# Patient Record
Sex: FEMALE | Race: WHITE | NOT HISPANIC OR LATINO | Employment: FULL TIME | URBAN - METROPOLITAN AREA
[De-identification: names, ages, dates, MRNs, and addresses within clinical notes are randomized per-mention and may not be internally consistent; named-entity substitution may affect disease eponyms.]

---

## 2018-10-12 ENCOUNTER — PROCEDURE VISIT (OUTPATIENT)
Dept: FAMILY MEDICINE | Facility: CLINIC | Age: 48
End: 2018-10-12
Payer: COMMERCIAL

## 2018-10-12 DIAGNOSIS — Z23 NEED FOR PROPHYLACTIC VACCINATION AND INOCULATION AGAINST INFLUENZA: Primary | ICD-10-CM

## 2018-10-12 PROCEDURE — 90686 IIV4 VACC NO PRSV 0.5 ML IM: CPT | Mod: S$GLB,,, | Performed by: FAMILY MEDICINE

## 2019-06-25 ENCOUNTER — TELEPHONE (OUTPATIENT)
Dept: FAMILY MEDICINE | Facility: CLINIC | Age: 49
End: 2019-06-25

## 2019-06-25 ENCOUNTER — HOSPITAL ENCOUNTER (OUTPATIENT)
Dept: RADIOLOGY | Facility: HOSPITAL | Age: 49
Discharge: HOME | End: 2019-06-25
Attending: NURSE PRACTITIONER
Payer: COMMERCIAL

## 2019-06-25 ENCOUNTER — OFFICE VISIT (OUTPATIENT)
Dept: INTERNAL MEDICINE | Facility: CLINIC | Age: 49
End: 2019-06-25
Payer: COMMERCIAL

## 2019-06-25 VITALS
BODY MASS INDEX: 28 KG/M2 | SYSTOLIC BLOOD PRESSURE: 126 MMHG | OXYGEN SATURATION: 98 % | DIASTOLIC BLOOD PRESSURE: 79 MMHG | HEART RATE: 68 BPM | HEIGHT: 64 IN | WEIGHT: 164 LBS

## 2019-06-25 DIAGNOSIS — R07.81 RIB PAIN: ICD-10-CM

## 2019-06-25 DIAGNOSIS — R07.81 RIB PAIN: Primary | ICD-10-CM

## 2019-06-25 PROCEDURE — 99999 PR PBB SHADOW E&M-EST. PATIENT-LVL III: ICD-10-PCS | Mod: PBBFAC,,, | Performed by: NURSE PRACTITIONER

## 2019-06-25 PROCEDURE — 99999 PR PBB SHADOW E&M-EST. PATIENT-LVL III: CPT | Mod: PBBFAC,,, | Performed by: NURSE PRACTITIONER

## 2019-06-25 PROCEDURE — 99212 PR OFFICE/OUTPT VISIT, EST, LEVL II, 10-19 MIN: ICD-10-PCS | Mod: S$GLB,,, | Performed by: NURSE PRACTITIONER

## 2019-06-25 PROCEDURE — 71046 X-RAY EXAM CHEST 2 VIEWS: CPT | Mod: TC,FY,PN

## 2019-06-25 PROCEDURE — 71046 X-RAY EXAM CHEST 2 VIEWS: CPT | Mod: 26,,, | Performed by: RADIOLOGY

## 2019-06-25 PROCEDURE — 71046 XR CHEST PA AND LATERAL: ICD-10-PCS | Mod: 26,,, | Performed by: RADIOLOGY

## 2019-06-25 PROCEDURE — 99212 OFFICE O/P EST SF 10 MIN: CPT | Mod: S$GLB,,, | Performed by: NURSE PRACTITIONER

## 2019-06-25 NOTE — TELEPHONE ENCOUNTER
Attempted to call pt x3, unable to LVM.             ----- Message from Elena Corea NP sent at 6/25/2019 11:40 AM CDT -----  I received her xray, please let patient know no broken ribs. However, mild bilateral AC (top of shoulder) joint changes.  Continue rest, ibuprofen OTC as needed  Rest and ice. Follow up if needed

## 2019-06-25 NOTE — PROGRESS NOTES
I received her xray, please let patient know no broken ribs. However, mild bilateral AC (top of shoulder) joint changes.  Continue rest, ibuprofen OTC as needed  Rest and ice. Follow up if needed

## 2019-06-25 NOTE — PROGRESS NOTES
"Subjective:       Patient ID: Anna Marie Lindo is a 48 y.o. female.    Chief Complaint: Chest Pain (possible broke rib while taking karate)    Anna Marie Lindo is a 48 year old female who presents to the clinic today for possible right rib fracture. She reports she participates in karate. She was sparing last Thursday, and go hit on her right chest. She reports intermittent rib pain, especially at night time. She reports she is taking aleve/ibuprofen with no relief. She reports, since the pain continued I wanted to have this confirmed or not. Denies chest tightness, but localized right lower chest pain at times. Susy sob at rest and exertion. Denies n/v/d.     Review of Systems   Constitutional: Negative for activity change, appetite change, chills, fatigue and fever.   HENT: Negative for congestion, tinnitus and trouble swallowing.    Eyes: Negative for photophobia, pain and visual disturbance.   Respiratory: Negative for apnea, cough, chest tightness, shortness of breath and wheezing.    Cardiovascular: Positive for chest pain (Right lower side). Negative for palpitations and leg swelling.   Gastrointestinal: Negative for abdominal distention, abdominal pain, constipation, diarrhea, nausea and vomiting.   Musculoskeletal: Positive for arthralgias (Right chest wall) and myalgias (Right chest wall). Negative for back pain, gait problem, joint swelling, neck pain and neck stiffness.   Skin: Negative for color change.   Neurological: Negative for dizziness, tremors, seizures, syncope, weakness, light-headedness, numbness and headaches.   Psychiatric/Behavioral: Positive for sleep disturbance (Related to pain). Negative for agitation and confusion. The patient is not nervous/anxious and is not hyperactive.          Reviewed family, medical, surgical, and social history.    Objective:      /79   Pulse 68   Ht 5' 4" (1.626 m)   Wt 74.4 kg (164 lb)   SpO2 98%   BMI 28.15 kg/m²   Physical Exam   Constitutional: " She is oriented to person, place, and time. She appears well-developed.   HENT:   Head: Normocephalic.   Eyes: Pupils are equal, round, and reactive to light.   Neck: Normal range of motion.   Cardiovascular: Normal rate, regular rhythm, normal heart sounds and intact distal pulses.   Pulmonary/Chest: Effort normal and breath sounds normal.       Abdominal: Soft. Bowel sounds are normal.   Musculoskeletal: Normal range of motion.   Neurological: She is alert and oriented to person, place, and time.   Skin: Skin is warm. Capillary refill takes less than 2 seconds.   Psychiatric: She has a normal mood and affect. Her behavior is normal. Judgment and thought content normal.       Assessment:       1. Rib pain        Plan:       Rib pain  -     X-Ray Chest PA And Lateral; Future; Expected date: 06/25/2019          PLAN:  - Discussed with patient the plan of care  - CXR ordered ; complete today  -Rib fracture possible; discussed with patient   - Encouraged rest and to not participate in karate until symptoms resolved   - Medications reviewed. Medication side effects discussed. Patient has no questions or concerns at this time. Informed patient to notify me regarding any concerns.   - Continue monitoring symptoms  - Informed patient to please notify me with any questions or concerns at anytime  - Follow up ordered PRN        Risks, benefits, and side effects were discussed with the patient. All questions were answered to the fullest satisfaction of the patient, and pt verbalized understanding and agreement to treatment plan. Pt was to call with any new or worsening symptoms, or present to the ER.

## 2019-10-03 ENCOUNTER — IMMUNIZATION (OUTPATIENT)
Dept: FAMILY MEDICINE | Facility: CLINIC | Age: 49
End: 2019-10-03

## 2019-10-03 PROCEDURE — 90686 FLU VACCINE (QUAD) GREATER THAN OR EQUAL TO 3YO PRESERVATIVE FREE IM: ICD-10-PCS | Mod: S$GLB,,, | Performed by: FAMILY MEDICINE

## 2019-10-03 PROCEDURE — 90686 IIV4 VACC NO PRSV 0.5 ML IM: CPT | Mod: S$GLB,,, | Performed by: FAMILY MEDICINE

## 2019-10-10 ENCOUNTER — CLINICAL SUPPORT (OUTPATIENT)
Dept: INTERNAL MEDICINE | Facility: CLINIC | Age: 49
End: 2019-10-10

## 2019-10-10 VITALS
HEIGHT: 64 IN | WEIGHT: 159 LBS | HEART RATE: 76 BPM | BODY MASS INDEX: 27.14 KG/M2 | SYSTOLIC BLOOD PRESSURE: 116 MMHG | TEMPERATURE: 98 F | OXYGEN SATURATION: 99 % | DIASTOLIC BLOOD PRESSURE: 70 MMHG

## 2019-10-10 DIAGNOSIS — Z00.00 ROUTINE GENERAL MEDICAL EXAMINATION AT A HEALTH CARE FACILITY: Primary | ICD-10-CM

## 2019-10-10 DIAGNOSIS — E87.6 HYPOKALEMIA: ICD-10-CM

## 2019-10-10 LAB
ALBUMIN SERPL BCP-MCNC: 3.9 G/DL (ref 3.5–5.2)
ALP SERPL-CCNC: 64 U/L (ref 55–135)
ALT SERPL W/O P-5'-P-CCNC: 28 U/L (ref 10–44)
ANION GAP SERPL CALC-SCNC: 12 MMOL/L (ref 8–16)
AST SERPL-CCNC: 24 U/L (ref 10–40)
BASOPHILS # BLD AUTO: 0.02 K/UL (ref 0–0.2)
BASOPHILS NFR BLD: 0.3 % (ref 0–1.9)
BILIRUB SERPL-MCNC: 0.3 MG/DL (ref 0.1–1)
BILIRUB UR QL STRIP: NEGATIVE
BUN SERPL-MCNC: 14 MG/DL (ref 6–20)
CALCIUM SERPL-MCNC: 8.9 MG/DL (ref 8.7–10.5)
CHLORIDE SERPL-SCNC: 97 MMOL/L (ref 95–110)
CHOLEST SERPL-MCNC: 189 MG/DL (ref 120–199)
CHOLEST/HDLC SERPL: 3.4 {RATIO} (ref 2–5)
CLARITY UR: CLEAR
CO2 SERPL-SCNC: 28 MMOL/L (ref 23–29)
COLOR UR: YELLOW
CREAT SERPL-MCNC: 0.7 MG/DL (ref 0.5–1.4)
CRP SERPL-MCNC: 0.21 MG/DL (ref 0–0.75)
DIFFERENTIAL METHOD: ABNORMAL
EOSINOPHIL # BLD AUTO: 0.1 K/UL (ref 0–0.5)
EOSINOPHIL NFR BLD: 1.6 % (ref 0–8)
ERYTHROCYTE [DISTWIDTH] IN BLOOD BY AUTOMATED COUNT: 11.8 % (ref 11.5–14.5)
EST. GFR  (AFRICAN AMERICAN): >60 ML/MIN/1.73 M^2
EST. GFR  (NON AFRICAN AMERICAN): >60 ML/MIN/1.73 M^2
GLUCOSE SERPL-MCNC: 91 MG/DL (ref 70–110)
GLUCOSE UR QL STRIP: NEGATIVE
HCT VFR BLD AUTO: 40.6 % (ref 37–48.5)
HDLC SERPL-MCNC: 55 MG/DL (ref 40–75)
HDLC SERPL: 29.1 % (ref 20–50)
HGB BLD-MCNC: 13.9 G/DL (ref 12–16)
HGB UR QL STRIP: NEGATIVE
IMM GRANULOCYTES # BLD AUTO: 0.01 K/UL (ref 0–0.04)
IMM GRANULOCYTES NFR BLD AUTO: 0.2 % (ref 0–0.5)
KETONES UR QL STRIP: NEGATIVE
LDLC SERPL CALC-MCNC: 103.2 MG/DL (ref 63–159)
LEUKOCYTE ESTERASE UR QL STRIP: NEGATIVE
LYMPHOCYTES # BLD AUTO: 1.9 K/UL (ref 1–4.8)
LYMPHOCYTES NFR BLD: 31 % (ref 18–48)
MCH RBC QN AUTO: 31.3 PG (ref 27–31)
MCHC RBC AUTO-ENTMCNC: 34.2 G/DL (ref 32–36)
MCV RBC AUTO: 91 FL (ref 82–98)
MONOCYTES # BLD AUTO: 0.5 K/UL (ref 0.3–1)
MONOCYTES NFR BLD: 7.2 % (ref 4–15)
NEUTROPHILS # BLD AUTO: 3.7 K/UL (ref 1.8–7.7)
NEUTROPHILS NFR BLD: 59.7 % (ref 38–73)
NITRITE UR QL STRIP: NEGATIVE
NONHDLC SERPL-MCNC: 134 MG/DL
NRBC BLD-RTO: 0 /100 WBC
PH UR STRIP: 6 [PH] (ref 5–8)
PLATELET # BLD AUTO: 284 K/UL (ref 150–350)
PMV BLD AUTO: 11 FL (ref 9.2–12.9)
POTASSIUM SERPL-SCNC: 3.1 MMOL/L (ref 3.5–5.1)
PROT SERPL-MCNC: 6.9 G/DL (ref 6–8.4)
PROT UR QL STRIP: NEGATIVE
RBC # BLD AUTO: 4.44 M/UL (ref 4–5.4)
SODIUM SERPL-SCNC: 137 MMOL/L (ref 136–145)
SP GR UR STRIP: 1.01 (ref 1–1.03)
TRIGL SERPL-MCNC: 154 MG/DL (ref 30–150)
TSH SERPL DL<=0.005 MIU/L-ACNC: 0.8 UIU/ML (ref 0.34–5.6)
URN SPEC COLLECT METH UR: NORMAL
UROBILINOGEN UR STRIP-ACNC: NEGATIVE EU/DL
WBC # BLD AUTO: 6.23 K/UL (ref 3.9–12.7)

## 2019-10-10 PROCEDURE — 80053 COMPREHEN METABOLIC PANEL: CPT

## 2019-10-10 PROCEDURE — 85025 COMPLETE CBC W/AUTO DIFF WBC: CPT

## 2019-10-10 PROCEDURE — 92551 PURE TONE HEARING TEST AIR: CPT | Mod: ,,, | Performed by: FAMILY MEDICINE

## 2019-10-10 PROCEDURE — 81003 URINALYSIS AUTO W/O SCOPE: CPT

## 2019-10-10 PROCEDURE — 99499 PR PHYSICAL - BASIC NON DOT/CDL: ICD-10-PCS | Mod: ,,, | Performed by: NURSE PRACTITIONER

## 2019-10-10 PROCEDURE — 99499 UNLISTED E&M SERVICE: CPT | Mod: ,,, | Performed by: NURSE PRACTITIONER

## 2019-10-10 PROCEDURE — 80061 LIPID PANEL: CPT

## 2019-10-10 PROCEDURE — 84443 ASSAY THYROID STIM HORMONE: CPT

## 2019-10-10 PROCEDURE — 93005 PR ELECTROCARDIOGRAM, TRACING: ICD-10-PCS | Mod: ,,, | Performed by: FAMILY MEDICINE

## 2019-10-10 PROCEDURE — 93005 ELECTROCARDIOGRAM TRACING: CPT | Mod: ,,, | Performed by: FAMILY MEDICINE

## 2019-10-10 PROCEDURE — 86140 C-REACTIVE PROTEIN: CPT

## 2019-10-10 PROCEDURE — 92551 PR PURE TONE HEARING TEST, AIR: ICD-10-PCS | Mod: ,,, | Performed by: FAMILY MEDICINE

## 2019-10-10 PROCEDURE — 83036 HEMOGLOBIN GLYCOSYLATED A1C: CPT

## 2019-10-10 RX ORDER — ALPRAZOLAM 0.5 MG/1
TABLET ORAL
COMMUNITY
End: 2021-07-29

## 2019-10-10 RX ORDER — CHOLECALCIFEROL (VITAMIN D3) 625 MCG
1 CAPSULE ORAL
Refills: 1 | COMMUNITY
Start: 2019-09-14 | End: 2021-07-29

## 2019-10-10 RX ORDER — HYDROCHLOROTHIAZIDE 50 MG/1
50 TABLET ORAL DAILY
Refills: 0 | COMMUNITY
Start: 2019-09-23 | End: 2020-03-10

## 2019-10-10 NOTE — PROGRESS NOTES
"Subjective:       Patient ID: Anna Marie Lindo is a 49 y.o. female.    Chief Complaint: Employment Physical (annual physical for [employer])    Mrs. Anna Marie Lindo is a 49 year old female who  presents to the clinic today for an occupational history and physical exam. 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 Today, she voices no questions/concerns. She denies tobacco use, alcohol or drug use. She is able to lift, bend, walk & exercise without SOB or CP. Overall, she denies CP, SOB, N/V/D, pain, headache, or any changes.       Review of Systems   Constitutional: Negative for activity change, appetite change, chills, fatigue, fever and unexpected weight change.   HENT: Negative for congestion, ear pain, postnasal drip, sore throat and tinnitus.    Eyes: Negative for pain and visual disturbance.        Glasses   Respiratory: Negative for apnea, cough, chest tightness, shortness of breath and wheezing.    Cardiovascular: Negative for chest pain, palpitations and leg swelling.   Gastrointestinal: Negative for abdominal distention, abdominal pain, blood in stool, constipation, diarrhea, nausea and vomiting.   Endocrine: Negative for polydipsia and polyuria.   Genitourinary: Negative for difficulty urinating, flank pain, frequency and urgency.   Musculoskeletal: Negative for arthralgias, back pain, joint swelling and myalgias.   Skin: Negative for color change, pallor and rash.   Allergic/Immunologic: Negative for environmental allergies and food allergies.   Neurological: Negative for dizziness, tremors, syncope, weakness, light-headedness and headaches.   Hematological: Does not bruise/bleed easily.   Psychiatric/Behavioral: Negative for agitation, decreased concentration, self-injury, sleep disturbance and suicidal ideas. The patient is not nervous/anxious.          Reviewed family, medical, surgical, and social history.    Objective:      /70   Pulse 76   Temp 97.6 °F (36.4 °C)   Ht 5' 4" " (1.626 m)   Wt 72.1 kg (159 lb)   SpO2 99%   BMI 27.29 kg/m²   Physical Exam   Constitutional: She is oriented to person, place, and time. She appears well-developed and well-nourished. She is cooperative. No distress.   HENT:   Head: Normocephalic and atraumatic.   Right Ear: Hearing, tympanic membrane, external ear and ear canal normal.   Left Ear: Hearing, tympanic membrane, external ear and ear canal normal.   Nose: Nose normal.   Mouth/Throat: Uvula is midline, oropharynx is clear and moist and mucous membranes are normal. No uvula swelling.   Eyes: Pupils are equal, round, and reactive to light. Conjunctivae, EOM and lids are normal.   Neck: Trachea normal and full passive range of motion without pain. No tracheal tenderness present. No neck rigidity. No thyroid mass present.   Cardiovascular: Normal rate, regular rhythm, S1 normal, S2 normal, normal heart sounds, intact distal pulses and normal pulses.   Pulmonary/Chest: Effort normal and breath sounds normal. No respiratory distress. She has no decreased breath sounds. She has no wheezes.   Abdominal: Soft. Normal appearance and bowel sounds are normal. She exhibits no distension and no abdominal bruit. There is no guarding and no CVA tenderness.   Musculoskeletal: She exhibits no edema, tenderness or deformity.   Lymphadenopathy:     She has no cervical adenopathy.   Neurological: She is alert and oriented to person, place, and time. She has normal strength. She exhibits normal muscle tone.   Skin: Skin is warm, dry and intact. Capillary refill takes less than 2 seconds. No abrasion, no laceration, no lesion and no rash noted. She is not diaphoretic. No cyanosis. Nails show no clubbing.   Psychiatric: She has a normal mood and affect. Her speech is normal and behavior is normal. Judgment and thought content normal. Cognition and memory are normal.       Assessment:       1. Routine general medical examination at a health care facility        Plan:        Routine general medical examination at a health care facility  -     Lipid panel; Future; Expected date: 10/10/2019  -     Hemoglobin A1c; Future; Expected date: 10/10/2019  -     C-reactive protein; Future; Expected date: 10/10/2019  -     Comprehensive metabolic panel; Future; Expected date: 10/15/2019  -     URINALYSIS  -     TSH; Future; Expected date: 10/10/2019  -     CBC auto differential; Future; Expected date: 10/15/2019          PLAN:  - Discussed with patient the plan of care  - Labs ordered and collected  - PFT and audiogram reviewed in clinic  - Work safety and PPE reviewed and encouraged   - Medications reviewed. Medication side effects discussed. Patient has no questions or concerns at this time. Informed patient to notify me regarding any concerns.   - Informed patient to please notify me with any questions or concerns at anytime  - Follow up ordered for 1 year and with PCP as ordered  - See scanned images      Risks, benefits, and side effects were discussed with the patient. All questions were answered to the fullest satisfaction of the patient, and pt verbalized understanding and agreement to treatment plan. Pt was to call with any new or worsening symptoms, or present to the ER.

## 2019-10-11 LAB
ESTIMATED AVG GLUCOSE: 103 MG/DL (ref 68–131)
HBA1C MFR BLD HPLC: 5.2 % (ref 4.5–6.2)

## 2019-10-11 NOTE — PROCEDURES
DATE OF PROCEDURE:  10/10/2019.    REFERRING EXAMINER:  Francisco Gunter Powell.    CLINICAL INDICATIONS:  Employment.    EKG shows a sinus bradycardia with a rate of 72.    INTERPRETATION:  EKG is within normal limits except for sinus bradycardia  being present at a rate of 52.        ANGELO/GREG dd: 10/11/2019 06:21:28 (CDT)   td: 10/11/2019 09:27:00 (CDT)  Doc ID #[id number]   Job ID #[id number]    CC:

## 2019-10-14 DIAGNOSIS — E87.6 HYPOKALEMIA: Primary | ICD-10-CM

## 2019-10-14 RX ORDER — POTASSIUM CHLORIDE 20 MEQ/1
TABLET, EXTENDED RELEASE ORAL
Qty: 2 TABLET | Refills: 0 | Status: SHIPPED | OUTPATIENT
Start: 2019-10-14 | End: 2019-10-17 | Stop reason: SDUPTHER

## 2019-10-14 NOTE — PROGRESS NOTES
I reviewed labs. Staff at the [facility] notified patient of labs. Med sent to pharmacy. Will repeat lab wednesday.

## 2019-10-16 ENCOUNTER — CLINICAL SUPPORT (OUTPATIENT)
Dept: INTERNAL MEDICINE | Facility: CLINIC | Age: 49
End: 2019-10-16
Payer: COMMERCIAL

## 2019-10-16 DIAGNOSIS — E87.6 HYPOKALEMIA: ICD-10-CM

## 2019-10-16 LAB — POTASSIUM SERPL-SCNC: 3.3 MMOL/L (ref 3.5–5.1)

## 2019-10-16 PROCEDURE — 84132 ASSAY OF SERUM POTASSIUM: CPT

## 2019-10-17 ENCOUNTER — TELEPHONE (OUTPATIENT)
Dept: FAMILY MEDICINE | Facility: CLINIC | Age: 49
End: 2019-10-17

## 2019-10-17 RX ORDER — POTASSIUM CHLORIDE 20 MEQ/1
TABLET, EXTENDED RELEASE ORAL
Qty: 1 TABLET | Refills: 0 | Status: SHIPPED | OUTPATIENT
Start: 2019-10-17 | End: 2020-02-10

## 2019-10-17 NOTE — PROGRESS NOTES
Please let Ms Thrasher know that I sent In 1 more dose of potassium. Her potassium was 3.3.   Normal is 3.5-5.

## 2019-10-17 NOTE — TELEPHONE ENCOUNTER
Called pt x3, unable to LVM.        ----- Message from Elena Corea NP sent at 10/17/2019  9:25 AM CDT -----  Please let Ms Thrasher know that I sent In 1 more dose of potassium. Her potassium was 3.3.   Normal is 3.5-5.

## 2019-10-21 ENCOUNTER — TELEPHONE (OUTPATIENT)
Dept: INTERNAL MEDICINE | Facility: CLINIC | Age: 49
End: 2019-10-21

## 2019-10-21 NOTE — TELEPHONE ENCOUNTER
Labs, EKG reviewed  Cleared to work without restriction  F/U with [facility] regarding labs  Letter being sent in mail for [facility] med physical

## 2019-10-21 NOTE — TELEPHONE ENCOUNTER
Janice ms sushma called to see if we had gotten her results back. I read in epic where they were not able to get in touch with her to tell her that you called in another dose of potassium. She asked If she needed to come back and get redrawn like she did last week. I told her that I would ask you and get back with her. She is going today to get the meds, please advise

## 2020-02-06 ENCOUNTER — PATIENT OUTREACH (OUTPATIENT)
Dept: ADMINISTRATIVE | Facility: HOSPITAL | Age: 50
End: 2020-02-06

## 2020-02-10 ENCOUNTER — OFFICE VISIT (OUTPATIENT)
Dept: INTERNAL MEDICINE | Facility: CLINIC | Age: 50
End: 2020-02-10
Payer: COMMERCIAL

## 2020-02-10 VITALS
WEIGHT: 161 LBS | HEIGHT: 64 IN | HEART RATE: 73 BPM | SYSTOLIC BLOOD PRESSURE: 115 MMHG | DIASTOLIC BLOOD PRESSURE: 75 MMHG | OXYGEN SATURATION: 99 % | BODY MASS INDEX: 27.49 KG/M2

## 2020-02-10 DIAGNOSIS — E87.6 HYPOKALEMIA: ICD-10-CM

## 2020-02-10 DIAGNOSIS — R79.89 ELEVATED LFTS: Primary | ICD-10-CM

## 2020-02-10 PROCEDURE — 99214 OFFICE O/P EST MOD 30 MIN: CPT | Mod: S$GLB,,, | Performed by: NURSE PRACTITIONER

## 2020-02-10 PROCEDURE — 99999 PR PBB SHADOW E&M-EST. PATIENT-LVL III: CPT | Mod: PBBFAC,,, | Performed by: NURSE PRACTITIONER

## 2020-02-10 PROCEDURE — 99214 PR OFFICE/OUTPT VISIT, EST, LEVL IV, 30-39 MIN: ICD-10-PCS | Mod: S$GLB,,, | Performed by: NURSE PRACTITIONER

## 2020-02-10 PROCEDURE — 99999 PR PBB SHADOW E&M-EST. PATIENT-LVL III: ICD-10-PCS | Mod: PBBFAC,,, | Performed by: NURSE PRACTITIONER

## 2020-02-10 RX ORDER — ALPRAZOLAM 0.5 MG/1
TABLET ORAL
COMMUNITY
End: 2022-02-09

## 2020-02-10 RX ORDER — POTASSIUM CHLORIDE 750 MG/1
10 CAPSULE, EXTENDED RELEASE ORAL DAILY
Qty: 30 CAPSULE | Refills: 6 | Status: SHIPPED | OUTPATIENT
Start: 2020-02-10 | End: 2021-05-13 | Stop reason: SDUPTHER

## 2020-02-10 NOTE — PROGRESS NOTES
Subjective:       Patient ID: Anna Marie Lindo is a 49 y.o. female.    Chief Complaint: lab review    Ms. Anna Marie Lindo is a 49 year old female who presents to the clinic today for lab review. She had outside labs completed at [facility]. She is requesting to transition care, since this is closer to her job. She reports she has HRT with her OBGYN. She gets injections every 3 months. She takes HCTZ 50 mg daily, for swelling, and reports hypokalemia in her labs. States she does not take potassium daily. Denies palpitations, sob or cp. Reports she takes HCTZ for swelling in her arms and legs.     Health Maintenance:  - LIPID: October  - Mammogram: Due, reports that she has it scheduled upcoming   - PAP: UTD    Review of Systems   Constitutional: Negative for activity change, appetite change, chills, fatigue, fever and unexpected weight change.   HENT: Negative for congestion, ear pain, postnasal drip, sore throat and tinnitus.    Eyes: Negative for pain and visual disturbance.        Glasses   Respiratory: Negative for apnea, cough, chest tightness, shortness of breath and wheezing.    Cardiovascular: Positive for leg swelling. Negative for chest pain and palpitations.   Gastrointestinal: Negative for abdominal distention, abdominal pain, blood in stool, constipation, diarrhea, nausea and vomiting.   Endocrine: Negative for polydipsia and polyuria.   Genitourinary: Negative for difficulty urinating, flank pain, frequency and urgency.   Musculoskeletal: Negative for arthralgias, back pain, joint swelling and myalgias.   Skin: Negative for color change, pallor and rash.   Allergic/Immunologic: Negative for environmental allergies and food allergies.   Neurological: Negative for dizziness, tremors, syncope, weakness, light-headedness and headaches.   Hematological: Does not bruise/bleed easily.   Psychiatric/Behavioral: Negative for agitation, decreased concentration, self-injury, sleep disturbance and suicidal ideas. The  "patient is not nervous/anxious.          Reviewed family, medical, surgical, and social history.    Objective:      /75   Pulse 73   Ht 5' 4" (1.626 m)   Wt 73 kg (161 lb)   SpO2 99%   BMI 27.64 kg/m²   Physical Exam   Constitutional: She is oriented to person, place, and time. She appears well-developed and well-nourished. She is cooperative. No distress.   HENT:   Head: Normocephalic and atraumatic.   Nose: Nose normal.   Mouth/Throat: Uvula is midline, oropharynx is clear and moist and mucous membranes are normal. No uvula swelling.   Eyes: Pupils are equal, round, and reactive to light. Conjunctivae, EOM and lids are normal.   Neck: Trachea normal and full passive range of motion without pain. No tracheal tenderness present. No neck rigidity. No thyroid mass present.   Cardiovascular: Normal rate, regular rhythm, S1 normal, S2 normal, normal heart sounds, intact distal pulses and normal pulses.   Pulmonary/Chest: Effort normal and breath sounds normal. No respiratory distress. She has no decreased breath sounds. She has no wheezes.   Abdominal: Soft. Normal appearance and bowel sounds are normal. She exhibits no distension and no abdominal bruit. There is no guarding and no CVA tenderness.   Musculoskeletal: She exhibits no edema, tenderness or deformity.   Lymphadenopathy:     She has no cervical adenopathy.   Neurological: She is alert and oriented to person, place, and time. She has normal strength. She exhibits normal muscle tone.   Skin: Skin is warm, dry and intact. Capillary refill takes less than 2 seconds. No abrasion, no laceration, no lesion and no rash noted. She is not diaphoretic. No cyanosis. Nails show no clubbing.   Psychiatric: She has a normal mood and affect. Her speech is normal and behavior is normal. Judgment and thought content normal. Cognition and memory are normal.       Assessment:       1. Elevated LFTs    2. Hypokalemia        Plan:       Elevated LFTs  -     " Comprehensive metabolic panel; Future; Expected date: 03/10/2020    Hypokalemia  -     potassium chloride (MICRO-K) 10 MEQ CpSR; Take 1 capsule (10 mEq total) by mouth once daily.  Dispense: 30 capsule; Refill: 6          PLAN:  - Discussed with patient the plan of care  - repeat labs in 1 month  - monitor diet. Decrease red meats  - exercise and diet reviewed   - Medications reviewed. Medication side effects discussed. Patient has no questions or concerns at this time. Informed patient to notify me regarding any concerns.   - Informed patient to please notify me with any questions or concerns at anytime  - Follow up ordered for 4 weeks for repeat labs      Risks, benefits, and side effects were discussed with the patient. All questions were answered to the fullest satisfaction of the patient, and pt verbalized understanding and agreement to treatment plan. Pt was to call with any new or worsening symptoms, or present to the ER.

## 2020-03-09 ENCOUNTER — CLINICAL SUPPORT (OUTPATIENT)
Dept: INTERNAL MEDICINE | Facility: CLINIC | Age: 50
End: 2020-03-09
Payer: COMMERCIAL

## 2020-03-09 DIAGNOSIS — R79.89 ELEVATED LFTS: ICD-10-CM

## 2020-03-09 LAB
ALBUMIN SERPL BCP-MCNC: 4.1 G/DL (ref 3.5–5.2)
ALP SERPL-CCNC: 67 U/L (ref 55–135)
ALT SERPL W/O P-5'-P-CCNC: 35 U/L (ref 10–44)
ANION GAP SERPL CALC-SCNC: 10 MMOL/L (ref 8–16)
AST SERPL-CCNC: 25 U/L (ref 10–40)
BILIRUB SERPL-MCNC: 0.5 MG/DL (ref 0.1–1)
BUN SERPL-MCNC: 13 MG/DL (ref 6–20)
CALCIUM SERPL-MCNC: 9.1 MG/DL (ref 8.7–10.5)
CHLORIDE SERPL-SCNC: 96 MMOL/L (ref 95–110)
CO2 SERPL-SCNC: 30 MMOL/L (ref 23–29)
CREAT SERPL-MCNC: 0.7 MG/DL (ref 0.5–1.4)
EST. GFR  (AFRICAN AMERICAN): >60 ML/MIN/1.73 M^2
EST. GFR  (NON AFRICAN AMERICAN): >60 ML/MIN/1.73 M^2
GLUCOSE SERPL-MCNC: 105 MG/DL (ref 70–110)
POTASSIUM SERPL-SCNC: 3.2 MMOL/L (ref 3.5–5.1)
PROT SERPL-MCNC: 7.3 G/DL (ref 6–8.4)
SODIUM SERPL-SCNC: 136 MMOL/L (ref 136–145)

## 2020-03-09 PROCEDURE — 80053 COMPREHEN METABOLIC PANEL: CPT

## 2020-03-10 ENCOUNTER — TELEPHONE (OUTPATIENT)
Dept: FAMILY MEDICINE | Facility: CLINIC | Age: 50
End: 2020-03-10

## 2020-03-10 DIAGNOSIS — E87.6 DIURETIC-INDUCED HYPOKALEMIA: Primary | ICD-10-CM

## 2020-03-10 DIAGNOSIS — T50.2X5A DIURETIC-INDUCED HYPOKALEMIA: Primary | ICD-10-CM

## 2020-03-10 RX ORDER — SPIRONOLACTONE 25 MG/1
25 TABLET ORAL DAILY
Qty: 30 TABLET | Refills: 11 | Status: SHIPPED | OUTPATIENT
Start: 2020-03-10 | End: 2021-07-29

## 2020-03-10 NOTE — PROGRESS NOTES
Please let Ms. Thrasher know that her potassium remains low. Im changing her fluid pill to spironalactone instead of HCTZ. im starting with a small dose of 25 mg daily. This medication should not cause low potassium, but a s/e could be high potassium. I want her to take this for 1 week, with 10 mEq of potassium and then recheck in 1 week. Her liver levels are normal  Thank you

## 2020-03-17 ENCOUNTER — CLINICAL SUPPORT (OUTPATIENT)
Dept: INTERNAL MEDICINE | Facility: CLINIC | Age: 50
End: 2020-03-17
Payer: COMMERCIAL

## 2020-03-17 DIAGNOSIS — E87.6 DIURETIC-INDUCED HYPOKALEMIA: ICD-10-CM

## 2020-03-17 DIAGNOSIS — T50.2X5A DIURETIC-INDUCED HYPOKALEMIA: ICD-10-CM

## 2020-03-17 LAB — POTASSIUM SERPL-SCNC: 4.2 MMOL/L (ref 3.5–5.1)

## 2020-03-17 PROCEDURE — 84132 ASSAY OF SERUM POTASSIUM: CPT

## 2020-08-18 PROBLEM — M25.512 BILATERAL SHOULDER PAIN: Status: ACTIVE | Noted: 2020-08-18

## 2020-08-18 PROBLEM — M25.511 BILATERAL SHOULDER PAIN: Status: ACTIVE | Noted: 2020-08-18

## 2020-09-16 DIAGNOSIS — Z00.00 ROUTINE GENERAL MEDICAL EXAMINATION AT A HEALTH CARE FACILITY: Primary | ICD-10-CM

## 2020-09-25 ENCOUNTER — CLINICAL SUPPORT (OUTPATIENT)
Dept: INTERNAL MEDICINE | Facility: CLINIC | Age: 50
End: 2020-09-25

## 2020-09-25 DIAGNOSIS — Z00.00 ROUTINE GENERAL MEDICAL EXAMINATION AT A HEALTH CARE FACILITY: Primary | ICD-10-CM

## 2020-09-25 LAB
ALBUMIN SERPL BCP-MCNC: 4.3 G/DL (ref 3.5–5.2)
ALP SERPL-CCNC: 75 U/L (ref 55–135)
ALT SERPL W/O P-5'-P-CCNC: 37 U/L (ref 10–44)
ANION GAP SERPL CALC-SCNC: 12 MMOL/L (ref 8–16)
AST SERPL-CCNC: 24 U/L (ref 10–40)
BASOPHILS # BLD AUTO: 0.03 K/UL (ref 0–0.2)
BASOPHILS NFR BLD: 0.5 % (ref 0–1.9)
BILIRUB SERPL-MCNC: 1 MG/DL (ref 0.1–1)
BILIRUB UR QL STRIP: NEGATIVE
BUN SERPL-MCNC: 11 MG/DL (ref 6–20)
CALCIUM SERPL-MCNC: 9.1 MG/DL (ref 8.7–10.5)
CHLORIDE SERPL-SCNC: 97 MMOL/L (ref 95–110)
CHOLEST SERPL-MCNC: 204 MG/DL (ref 120–199)
CHOLEST/HDLC SERPL: 4.4 {RATIO} (ref 2–5)
CLARITY UR: CLEAR
CO2 SERPL-SCNC: 27 MMOL/L (ref 23–29)
COLOR UR: YELLOW
CREAT SERPL-MCNC: 0.8 MG/DL (ref 0.5–1.4)
CRP SERPL-MCNC: 0.29 MG/DL (ref 0–0.75)
DIFFERENTIAL METHOD: ABNORMAL
EOSINOPHIL # BLD AUTO: 0.1 K/UL (ref 0–0.5)
EOSINOPHIL NFR BLD: 1.4 % (ref 0–8)
ERYTHROCYTE [DISTWIDTH] IN BLOOD BY AUTOMATED COUNT: 12 % (ref 11.5–14.5)
EST. GFR  (AFRICAN AMERICAN): >60 ML/MIN/1.73 M^2
EST. GFR  (NON AFRICAN AMERICAN): >60 ML/MIN/1.73 M^2
GLUCOSE SERPL-MCNC: 108 MG/DL (ref 70–110)
GLUCOSE UR QL STRIP: NEGATIVE
HCT VFR BLD AUTO: 41.5 % (ref 37–48.5)
HDLC SERPL-MCNC: 46 MG/DL (ref 40–75)
HDLC SERPL: 22.5 % (ref 20–50)
HGB BLD-MCNC: 14.7 G/DL (ref 12–16)
HGB UR QL STRIP: NEGATIVE
IMM GRANULOCYTES # BLD AUTO: 0.02 K/UL (ref 0–0.04)
IMM GRANULOCYTES NFR BLD AUTO: 0.3 % (ref 0–0.5)
KETONES UR QL STRIP: NEGATIVE
LDLC SERPL CALC-MCNC: 120.4 MG/DL (ref 63–159)
LEUKOCYTE ESTERASE UR QL STRIP: NEGATIVE
LYMPHOCYTES # BLD AUTO: 2.3 K/UL (ref 1–4.8)
LYMPHOCYTES NFR BLD: 34.9 % (ref 18–48)
MCH RBC QN AUTO: 32.5 PG (ref 27–31)
MCHC RBC AUTO-ENTMCNC: 35.4 G/DL (ref 32–36)
MCV RBC AUTO: 92 FL (ref 82–98)
MONOCYTES # BLD AUTO: 0.5 K/UL (ref 0.3–1)
MONOCYTES NFR BLD: 7.1 % (ref 4–15)
NEUTROPHILS # BLD AUTO: 3.7 K/UL (ref 1.8–7.7)
NEUTROPHILS NFR BLD: 55.8 % (ref 38–73)
NITRITE UR QL STRIP: NEGATIVE
NONHDLC SERPL-MCNC: 158 MG/DL
NRBC BLD-RTO: 0 /100 WBC
PH UR STRIP: 6 [PH] (ref 5–8)
PLATELET # BLD AUTO: 307 K/UL (ref 150–350)
PMV BLD AUTO: 9.7 FL (ref 9.2–12.9)
POTASSIUM SERPL-SCNC: 3.8 MMOL/L (ref 3.5–5.1)
PROT SERPL-MCNC: 7.5 G/DL (ref 6–8.4)
PROT UR QL STRIP: NEGATIVE
RBC # BLD AUTO: 4.53 M/UL (ref 4–5.4)
SODIUM SERPL-SCNC: 136 MMOL/L (ref 136–145)
SP GR UR STRIP: 1.02 (ref 1–1.03)
TRIGL SERPL-MCNC: 188 MG/DL (ref 30–150)
TSH SERPL DL<=0.005 MIU/L-ACNC: 0.9 UIU/ML (ref 0.34–5.6)
URN SPEC COLLECT METH UR: NORMAL
UROBILINOGEN UR STRIP-ACNC: NEGATIVE EU/DL
WBC # BLD AUTO: 6.61 K/UL (ref 3.9–12.7)

## 2020-09-25 PROCEDURE — 93005 ELECTROCARDIOGRAM TRACING: CPT | Mod: ,,, | Performed by: NURSE PRACTITIONER

## 2020-09-25 PROCEDURE — 93005 PR ELECTROCARDIOGRAM, TRACING: ICD-10-PCS | Mod: ,,, | Performed by: NURSE PRACTITIONER

## 2020-09-25 PROCEDURE — 85025 COMPLETE CBC W/AUTO DIFF WBC: CPT

## 2020-09-25 PROCEDURE — 80053 COMPREHEN METABOLIC PANEL: CPT

## 2020-09-25 PROCEDURE — 84443 ASSAY THYROID STIM HORMONE: CPT

## 2020-09-25 PROCEDURE — 81003 URINALYSIS AUTO W/O SCOPE: CPT

## 2020-09-25 PROCEDURE — 80061 LIPID PANEL: CPT

## 2020-09-25 PROCEDURE — 86140 C-REACTIVE PROTEIN: CPT

## 2020-09-25 NOTE — PROGRESS NOTES
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 ekg and labs performed. Will come back at a later date to do hands on portion

## 2020-10-02 ENCOUNTER — CLINICAL SUPPORT (OUTPATIENT)
Dept: INTERNAL MEDICINE | Facility: CLINIC | Age: 50
End: 2020-10-02

## 2020-10-02 VITALS
HEART RATE: 84 BPM | RESPIRATION RATE: 19 BRPM | SYSTOLIC BLOOD PRESSURE: 125 MMHG | HEIGHT: 64 IN | WEIGHT: 162 LBS | DIASTOLIC BLOOD PRESSURE: 76 MMHG | TEMPERATURE: 97 F | OXYGEN SATURATION: 98 % | BODY MASS INDEX: 27.66 KG/M2

## 2020-10-02 DIAGNOSIS — Z00.00 ROUTINE GENERAL MEDICAL EXAMINATION AT A HEALTH CARE FACILITY: Primary | ICD-10-CM

## 2020-10-02 PROCEDURE — 99499 UNLISTED E&M SERVICE: CPT | Mod: ,,, | Performed by: NURSE PRACTITIONER

## 2020-10-02 PROCEDURE — 99499 PR PHYSICAL - BASIC NON DOT/CDL: ICD-10-PCS | Mod: ,,, | Performed by: NURSE PRACTITIONER

## 2020-10-02 NOTE — PROGRESS NOTES
Subjective:       Patient ID: Anna Marie Lindo is a 50 y.o. female.    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 Anna Marie Lindo is a 50 year old female who presents to the clinic today for an occupational history and physical exam. He works for [employer] Today, he voices no questions/concerns. He denies tobacco use, alcohol or drug use. Medical history and medications reviewed. He is able to lift, bend, walk & exercise without SOB or CP. Overall, he denies CP, SOB, N/V/D, pain, headache, or any changes.       Review of Systems   Constitutional: Negative for activity change, appetite change, chills, fatigue, fever and unexpected weight change.   HENT: Negative for congestion, ear pain, postnasal drip, sore throat and tinnitus.    Eyes: Negative for pain and visual disturbance.        Glasses   Respiratory: Negative for apnea, cough, chest tightness, shortness of breath and wheezing.    Cardiovascular: Positive for leg swelling. Negative for chest pain and palpitations.   Gastrointestinal: Negative for abdominal distention, abdominal pain, blood in stool, constipation, diarrhea, nausea and vomiting.   Endocrine: Negative for polydipsia and polyuria.   Genitourinary: Negative for difficulty urinating, flank pain, frequency and urgency.   Musculoskeletal: Negative for arthralgias, back pain, joint swelling and myalgias.   Skin: Negative for color change, pallor and rash.   Allergic/Immunologic: Negative for environmental allergies and food allergies.   Neurological: Negative for dizziness, tremors, syncope, weakness, light-headedness and headaches.   Hematological: Does not bruise/bleed easily.   Psychiatric/Behavioral: Negative for agitation, decreased concentration, self-injury, sleep disturbance and suicidal ideas. The patient is not nervous/anxious.          Reviewed family, medical, surgical, and social history.    Objective:      /76 (BP Location: Right arm, Patient Position:  "Sitting, BP Method: Medium (Automatic))   Pulse 84   Temp 97.4 °F (36.3 °C) (Tympanic)   Resp 19   Ht 5' 4" (1.626 m)   Wt 73.5 kg (162 lb)   SpO2 98%   BMI 27.81 kg/m²   Physical Exam  Constitutional:       General: She is not in acute distress.     Appearance: Normal appearance. She is well-developed. She is not diaphoretic.   HENT:      Head: Normocephalic and atraumatic.      Right Ear: Tympanic membrane, ear canal and external ear normal. There is no impacted cerumen.      Left Ear: Tympanic membrane, ear canal and external ear normal. There is no impacted cerumen.      Nose: Nose normal.      Mouth/Throat:      Mouth: Mucous membranes are moist.      Pharynx: Oropharynx is clear. Uvula midline. No uvula swelling.   Eyes:      General: Lids are normal.      Conjunctiva/sclera: Conjunctivae normal.      Pupils: Pupils are equal, round, and reactive to light.   Neck:      Musculoskeletal: Full passive range of motion without pain and normal range of motion. No neck rigidity.      Thyroid: No thyroid mass.      Trachea: Trachea normal. No tracheal tenderness.   Cardiovascular:      Rate and Rhythm: Normal rate and regular rhythm.      Pulses: Normal pulses.      Heart sounds: Normal heart sounds, S1 normal and S2 normal.   Pulmonary:      Effort: Pulmonary effort is normal. No respiratory distress.      Breath sounds: Normal breath sounds. No decreased breath sounds or wheezing.   Abdominal:      General: Bowel sounds are normal. There is no distension or abdominal bruit.      Palpations: Abdomen is soft.      Tenderness: There is no guarding.   Musculoskeletal: Normal range of motion.         General: No tenderness or deformity.   Lymphadenopathy:      Cervical: No cervical adenopathy.   Skin:     General: Skin is warm and dry.      Capillary Refill: Capillary refill takes less than 2 seconds.      Findings: No abrasion, laceration, lesion or rash.      Nails: There is no clubbing.     Neurological:      " General: No focal deficit present.      Mental Status: She is alert and oriented to person, place, and time.      Motor: No abnormal muscle tone.   Psychiatric:         Mood and Affect: Mood normal.         Speech: Speech normal.         Behavior: Behavior normal. Behavior is cooperative.         Thought Content: Thought content normal.         Judgment: Judgment normal.         Assessment:       1. Routine general medical examination at a health care facility        Plan:       Routine general medical examination at a health care facility          PLAN:  - Discussed with patient the plan of care  - Medications reviewed. Medication side effects discussed. Patient has no questions or concerns at this time. Informed patient to notify me regarding any concerns.   - Informed patient to please notify me with any questions or concerns at anytime  - Follow up ordered for 1 year      Risks, benefits, and side effects were discussed with the patient. All questions were answered to the fullest satisfaction of the patient, and pt verbalized understanding and agreement to treatment plan. Pt was to call with any new or worsening symptoms, or present to the ER.

## 2021-05-13 ENCOUNTER — OFFICE VISIT (OUTPATIENT)
Dept: FAMILY MEDICINE | Facility: CLINIC | Age: 51
End: 2021-05-13
Payer: COMMERCIAL

## 2021-05-13 VITALS
RESPIRATION RATE: 18 BRPM | TEMPERATURE: 99 F | DIASTOLIC BLOOD PRESSURE: 72 MMHG | BODY MASS INDEX: 28.07 KG/M2 | OXYGEN SATURATION: 97 % | HEIGHT: 64 IN | HEART RATE: 81 BPM | SYSTOLIC BLOOD PRESSURE: 112 MMHG | WEIGHT: 164.44 LBS

## 2021-05-13 DIAGNOSIS — K21.9 GASTROESOPHAGEAL REFLUX DISEASE WITHOUT ESOPHAGITIS: ICD-10-CM

## 2021-05-13 DIAGNOSIS — H93.8X3 PRESSURE SENSATION IN BOTH EARS: ICD-10-CM

## 2021-05-13 DIAGNOSIS — J02.9 SORE THROAT: Primary | ICD-10-CM

## 2021-05-13 DIAGNOSIS — R05.9 COUGH: ICD-10-CM

## 2021-05-13 DIAGNOSIS — E87.6 HYPOKALEMIA: ICD-10-CM

## 2021-05-13 LAB
CTP QC/QA: YES
S PYO RRNA THROAT QL PROBE: NEGATIVE

## 2021-05-13 PROCEDURE — 87880 POCT RAPID STREP A: ICD-10-PCS | Mod: QW,S$GLB,, | Performed by: FAMILY MEDICINE

## 2021-05-13 PROCEDURE — 87880 STREP A ASSAY W/OPTIC: CPT | Mod: QW,S$GLB,, | Performed by: FAMILY MEDICINE

## 2021-05-13 PROCEDURE — 99214 PR OFFICE/OUTPT VISIT, EST, LEVL IV, 30-39 MIN: ICD-10-PCS | Mod: S$GLB,,, | Performed by: FAMILY MEDICINE

## 2021-05-13 PROCEDURE — 87081 CULTURE SCREEN ONLY: CPT | Performed by: FAMILY MEDICINE

## 2021-05-13 PROCEDURE — 99999 PR PBB SHADOW E&M-EST. PATIENT-LVL III: CPT | Mod: PBBFAC,,, | Performed by: FAMILY MEDICINE

## 2021-05-13 PROCEDURE — 99214 OFFICE O/P EST MOD 30 MIN: CPT | Mod: S$GLB,,, | Performed by: FAMILY MEDICINE

## 2021-05-13 PROCEDURE — 99999 PR PBB SHADOW E&M-EST. PATIENT-LVL III: ICD-10-PCS | Mod: PBBFAC,,, | Performed by: FAMILY MEDICINE

## 2021-05-13 RX ORDER — POTASSIUM CHLORIDE 750 MG/1
10 CAPSULE, EXTENDED RELEASE ORAL DAILY
Qty: 30 CAPSULE | Refills: 6 | Status: SHIPPED | OUTPATIENT
Start: 2021-05-13 | End: 2021-08-10

## 2021-05-13 RX ORDER — MONTELUKAST SODIUM 10 MG/1
10 TABLET ORAL NIGHTLY
Qty: 30 TABLET | Refills: 0 | Status: SHIPPED | OUTPATIENT
Start: 2021-05-13 | End: 2021-06-12

## 2021-05-13 RX ORDER — PANTOPRAZOLE SODIUM 20 MG/1
20 TABLET, DELAYED RELEASE ORAL DAILY
Qty: 30 TABLET | Refills: 11 | Status: SHIPPED | OUTPATIENT
Start: 2021-05-13 | End: 2021-07-29

## 2021-05-15 LAB — BACTERIA THROAT CULT: NORMAL

## 2021-07-29 ENCOUNTER — OFFICE VISIT (OUTPATIENT)
Dept: FAMILY MEDICINE | Facility: CLINIC | Age: 51
End: 2021-07-29
Payer: COMMERCIAL

## 2021-07-29 VITALS
BODY MASS INDEX: 29.02 KG/M2 | HEIGHT: 64 IN | TEMPERATURE: 98 F | HEART RATE: 93 BPM | SYSTOLIC BLOOD PRESSURE: 114 MMHG | WEIGHT: 170 LBS | DIASTOLIC BLOOD PRESSURE: 72 MMHG

## 2021-07-29 DIAGNOSIS — Z00.00 ENCOUNTER FOR ANNUAL HEALTH EXAMINATION: Primary | ICD-10-CM

## 2021-07-29 DIAGNOSIS — Z13.6 ENCOUNTER FOR LIPID SCREENING FOR CARDIOVASCULAR DISEASE: ICD-10-CM

## 2021-07-29 DIAGNOSIS — Z11.59 ENCOUNTER FOR HEPATITIS C SCREENING TEST FOR LOW RISK PATIENT: ICD-10-CM

## 2021-07-29 DIAGNOSIS — E61.1 IRON DEFICIENCY: ICD-10-CM

## 2021-07-29 DIAGNOSIS — M79.10 MYALGIA: ICD-10-CM

## 2021-07-29 DIAGNOSIS — R53.82 CHRONIC FATIGUE: ICD-10-CM

## 2021-07-29 DIAGNOSIS — Z13.220 ENCOUNTER FOR LIPID SCREENING FOR CARDIOVASCULAR DISEASE: ICD-10-CM

## 2021-07-29 DIAGNOSIS — K21.9 GASTROESOPHAGEAL REFLUX DISEASE WITHOUT ESOPHAGITIS: ICD-10-CM

## 2021-07-29 DIAGNOSIS — E55.9 VITAMIN D DEFICIENCY: ICD-10-CM

## 2021-07-29 DIAGNOSIS — R10.84 GENERALIZED ABDOMINAL PAIN: ICD-10-CM

## 2021-07-29 PROBLEM — M25.511 BILATERAL SHOULDER PAIN: Status: RESOLVED | Noted: 2020-08-18 | Resolved: 2021-07-29

## 2021-07-29 PROBLEM — M25.512 BILATERAL SHOULDER PAIN: Status: RESOLVED | Noted: 2020-08-18 | Resolved: 2021-07-29

## 2021-07-29 PROCEDURE — 99204 OFFICE O/P NEW MOD 45 MIN: CPT | Mod: S$GLB,,, | Performed by: INTERNAL MEDICINE

## 2021-07-29 PROCEDURE — 99999 PR PBB SHADOW E&M-EST. PATIENT-LVL III: ICD-10-PCS | Mod: PBBFAC,,, | Performed by: INTERNAL MEDICINE

## 2021-07-29 PROCEDURE — 99204 PR OFFICE/OUTPT VISIT, NEW, LEVL IV, 45-59 MIN: ICD-10-PCS | Mod: S$GLB,,, | Performed by: INTERNAL MEDICINE

## 2021-07-29 PROCEDURE — 99999 PR PBB SHADOW E&M-EST. PATIENT-LVL III: CPT | Mod: PBBFAC,,, | Performed by: INTERNAL MEDICINE

## 2021-07-29 RX ORDER — PNV NO.95/FERROUS FUM/FOLIC AC 28MG-0.8MG
100 TABLET ORAL DAILY
COMMUNITY
End: 2022-02-09

## 2021-07-29 RX ORDER — FERROUS SULFATE 325(65) MG
325 TABLET, DELAYED RELEASE (ENTERIC COATED) ORAL
COMMUNITY
End: 2022-02-09

## 2021-07-29 RX ORDER — PANTOPRAZOLE SODIUM 40 MG/1
40 TABLET, DELAYED RELEASE ORAL DAILY
Qty: 30 TABLET | Refills: 11 | Status: SHIPPED | OUTPATIENT
Start: 2021-07-29 | End: 2022-08-09

## 2021-07-29 RX ORDER — NITROFURANTOIN 25; 75 MG/1; MG/1
100 CAPSULE ORAL 2 TIMES DAILY
COMMUNITY
Start: 2021-07-28 | End: 2022-02-09

## 2021-07-29 RX ORDER — HYDROCHLOROTHIAZIDE 50 MG/1
50 TABLET ORAL DAILY
COMMUNITY
Start: 2021-07-28 | End: 2022-02-21 | Stop reason: ALTCHOICE

## 2021-08-04 ENCOUNTER — LAB VISIT (OUTPATIENT)
Dept: LAB | Facility: HOSPITAL | Age: 51
End: 2021-08-04
Attending: INTERNAL MEDICINE
Payer: COMMERCIAL

## 2021-08-04 DIAGNOSIS — R53.82 CHRONIC FATIGUE: ICD-10-CM

## 2021-08-04 DIAGNOSIS — M79.10 MYALGIA: ICD-10-CM

## 2021-08-04 DIAGNOSIS — E55.9 VITAMIN D DEFICIENCY: ICD-10-CM

## 2021-08-04 DIAGNOSIS — Z11.59 ENCOUNTER FOR HEPATITIS C SCREENING TEST FOR LOW RISK PATIENT: ICD-10-CM

## 2021-08-04 DIAGNOSIS — Z13.6 ENCOUNTER FOR LIPID SCREENING FOR CARDIOVASCULAR DISEASE: ICD-10-CM

## 2021-08-04 DIAGNOSIS — E61.1 IRON DEFICIENCY: ICD-10-CM

## 2021-08-04 DIAGNOSIS — Z00.00 ENCOUNTER FOR ANNUAL HEALTH EXAMINATION: ICD-10-CM

## 2021-08-04 DIAGNOSIS — R10.84 GENERALIZED ABDOMINAL PAIN: ICD-10-CM

## 2021-08-04 DIAGNOSIS — Z13.220 ENCOUNTER FOR LIPID SCREENING FOR CARDIOVASCULAR DISEASE: ICD-10-CM

## 2021-08-04 LAB
25(OH)D3+25(OH)D2 SERPL-MCNC: 77 NG/ML (ref 30–96)
ALBUMIN SERPL BCP-MCNC: 4.2 G/DL (ref 3.5–5.2)
ALP SERPL-CCNC: 91 U/L (ref 55–135)
ALT SERPL W/O P-5'-P-CCNC: 32 U/L (ref 10–44)
AMYLASE SERPL-CCNC: 45 U/L (ref 20–110)
ANION GAP SERPL CALC-SCNC: 12 MMOL/L (ref 8–16)
AST SERPL-CCNC: 23 U/L (ref 10–40)
BILIRUB SERPL-MCNC: 0.8 MG/DL (ref 0.1–1)
BUN SERPL-MCNC: 12 MG/DL (ref 6–20)
CALCIUM SERPL-MCNC: 9.6 MG/DL (ref 8.7–10.5)
CHLORIDE SERPL-SCNC: 94 MMOL/L (ref 95–110)
CHOLEST SERPL-MCNC: 187 MG/DL (ref 120–199)
CHOLEST/HDLC SERPL: 4.1 {RATIO} (ref 2–5)
CK SERPL-CCNC: 45 U/L (ref 20–180)
CO2 SERPL-SCNC: 29 MMOL/L (ref 23–29)
CREAT SERPL-MCNC: 0.8 MG/DL (ref 0.5–1.4)
CRP SERPL-MCNC: 5.8 MG/L (ref 0–8.2)
ERYTHROCYTE [DISTWIDTH] IN BLOOD BY AUTOMATED COUNT: 12 % (ref 11.5–14.5)
ERYTHROCYTE [SEDIMENTATION RATE] IN BLOOD BY WESTERGREN METHOD: 5 MM/HR (ref 0–20)
EST. GFR  (AFRICAN AMERICAN): >60 ML/MIN/1.73 M^2
EST. GFR  (NON AFRICAN AMERICAN): >60 ML/MIN/1.73 M^2
FERRITIN SERPL-MCNC: 289 NG/ML (ref 20–300)
GLUCOSE SERPL-MCNC: 101 MG/DL (ref 70–110)
HCT VFR BLD AUTO: 43.1 % (ref 37–48.5)
HDLC SERPL-MCNC: 46 MG/DL (ref 40–75)
HDLC SERPL: 24.6 % (ref 20–50)
HGB BLD-MCNC: 15 G/DL (ref 12–16)
IRON SERPL-MCNC: 117 UG/DL (ref 30–160)
LDLC SERPL CALC-MCNC: 109 MG/DL (ref 63–159)
LIPASE SERPL-CCNC: 23 U/L (ref 4–60)
MAGNESIUM SERPL-MCNC: 2.1 MG/DL (ref 1.6–2.6)
MCH RBC QN AUTO: 31.8 PG (ref 27–31)
MCHC RBC AUTO-ENTMCNC: 34.8 G/DL (ref 32–36)
MCV RBC AUTO: 91 FL (ref 82–98)
NONHDLC SERPL-MCNC: 141 MG/DL
PHOSPHATE SERPL-MCNC: 1.9 MG/DL (ref 2.7–4.5)
PLATELET # BLD AUTO: 345 K/UL (ref 150–450)
PMV BLD AUTO: 9.9 FL (ref 9.2–12.9)
POTASSIUM SERPL-SCNC: 2.7 MMOL/L (ref 3.5–5.1)
PROT SERPL-MCNC: 7.6 G/DL (ref 6–8.4)
RBC # BLD AUTO: 4.72 M/UL (ref 4–5.4)
SATURATED IRON: 24 % (ref 20–50)
SODIUM SERPL-SCNC: 135 MMOL/L (ref 136–145)
TOTAL IRON BINDING CAPACITY: 480 UG/DL (ref 250–450)
TRANSFERRIN SERPL-MCNC: 324 MG/DL (ref 200–375)
TRIGL SERPL-MCNC: 160 MG/DL (ref 30–150)
TSH SERPL DL<=0.005 MIU/L-ACNC: 0.8 UIU/ML (ref 0.4–4)
VIT B12 SERPL-MCNC: 1242 PG/ML (ref 210–950)
WBC # BLD AUTO: 7.98 K/UL (ref 3.9–12.7)

## 2021-08-04 PROCEDURE — 82607 VITAMIN B-12: CPT | Performed by: INTERNAL MEDICINE

## 2021-08-04 PROCEDURE — 36415 COLL VENOUS BLD VENIPUNCTURE: CPT | Mod: PO | Performed by: INTERNAL MEDICINE

## 2021-08-04 PROCEDURE — 83540 ASSAY OF IRON: CPT | Performed by: INTERNAL MEDICINE

## 2021-08-04 PROCEDURE — 84100 ASSAY OF PHOSPHORUS: CPT | Performed by: INTERNAL MEDICINE

## 2021-08-04 PROCEDURE — 86038 ANTINUCLEAR ANTIBODIES: CPT | Performed by: INTERNAL MEDICINE

## 2021-08-04 PROCEDURE — 85651 RBC SED RATE NONAUTOMATED: CPT | Mod: PO | Performed by: INTERNAL MEDICINE

## 2021-08-04 PROCEDURE — 82550 ASSAY OF CK (CPK): CPT | Performed by: INTERNAL MEDICINE

## 2021-08-04 PROCEDURE — 82728 ASSAY OF FERRITIN: CPT | Performed by: INTERNAL MEDICINE

## 2021-08-04 PROCEDURE — 82150 ASSAY OF AMYLASE: CPT | Performed by: INTERNAL MEDICINE

## 2021-08-04 PROCEDURE — 80061 LIPID PANEL: CPT | Performed by: INTERNAL MEDICINE

## 2021-08-04 PROCEDURE — 84443 ASSAY THYROID STIM HORMONE: CPT | Performed by: INTERNAL MEDICINE

## 2021-08-04 PROCEDURE — 80053 COMPREHEN METABOLIC PANEL: CPT | Performed by: INTERNAL MEDICINE

## 2021-08-04 PROCEDURE — 83735 ASSAY OF MAGNESIUM: CPT | Performed by: INTERNAL MEDICINE

## 2021-08-04 PROCEDURE — 85027 COMPLETE CBC AUTOMATED: CPT | Performed by: INTERNAL MEDICINE

## 2021-08-04 PROCEDURE — 86803 HEPATITIS C AB TEST: CPT | Performed by: INTERNAL MEDICINE

## 2021-08-04 PROCEDURE — 83690 ASSAY OF LIPASE: CPT | Performed by: INTERNAL MEDICINE

## 2021-08-04 PROCEDURE — 86140 C-REACTIVE PROTEIN: CPT | Performed by: INTERNAL MEDICINE

## 2021-08-04 PROCEDURE — 82306 VITAMIN D 25 HYDROXY: CPT | Performed by: INTERNAL MEDICINE

## 2021-08-05 ENCOUNTER — TELEPHONE (OUTPATIENT)
Dept: FAMILY MEDICINE | Facility: CLINIC | Age: 51
End: 2021-08-05

## 2021-08-05 DIAGNOSIS — E87.6 HYPOKALEMIA: Primary | ICD-10-CM

## 2021-08-05 LAB
ANA SER QL IF: NORMAL
HCV AB SERPL QL IA: NEGATIVE

## 2021-08-06 ENCOUNTER — LAB VISIT (OUTPATIENT)
Dept: LAB | Facility: HOSPITAL | Age: 51
End: 2021-08-06
Attending: INTERNAL MEDICINE
Payer: COMMERCIAL

## 2021-08-06 DIAGNOSIS — E87.6 HYPOKALEMIA: ICD-10-CM

## 2021-08-06 LAB
ANION GAP SERPL CALC-SCNC: 7 MMOL/L (ref 8–16)
BUN SERPL-MCNC: 9 MG/DL (ref 6–20)
CALCIUM SERPL-MCNC: 9.4 MG/DL (ref 8.7–10.5)
CHLORIDE SERPL-SCNC: 98 MMOL/L (ref 95–110)
CO2 SERPL-SCNC: 31 MMOL/L (ref 23–29)
CREAT SERPL-MCNC: 0.7 MG/DL (ref 0.5–1.4)
EST. GFR  (AFRICAN AMERICAN): >60 ML/MIN/1.73 M^2
EST. GFR  (NON AFRICAN AMERICAN): >60 ML/MIN/1.73 M^2
GLUCOSE SERPL-MCNC: 85 MG/DL (ref 70–110)
POTASSIUM SERPL-SCNC: 3.1 MMOL/L (ref 3.5–5.1)
SODIUM SERPL-SCNC: 136 MMOL/L (ref 136–145)

## 2021-08-06 PROCEDURE — 36415 COLL VENOUS BLD VENIPUNCTURE: CPT | Mod: PO | Performed by: INTERNAL MEDICINE

## 2021-08-06 PROCEDURE — 80048 BASIC METABOLIC PNL TOTAL CA: CPT | Performed by: INTERNAL MEDICINE

## 2021-08-10 DIAGNOSIS — E83.39 HYPOPHOSPHATEMIA: Primary | ICD-10-CM

## 2021-08-10 DIAGNOSIS — E87.6 HYPOKALEMIA: ICD-10-CM

## 2021-08-10 RX ORDER — POTASSIUM CHLORIDE 750 MG/1
20 CAPSULE, EXTENDED RELEASE ORAL DAILY
Qty: 60 CAPSULE | Refills: 6 | Status: SHIPPED | OUTPATIENT
Start: 2021-08-10 | End: 2022-03-13

## 2021-08-11 ENCOUNTER — PATIENT MESSAGE (OUTPATIENT)
Dept: FAMILY MEDICINE | Facility: CLINIC | Age: 51
End: 2021-08-11

## 2021-08-18 ENCOUNTER — LAB VISIT (OUTPATIENT)
Dept: LAB | Facility: HOSPITAL | Age: 51
End: 2021-08-18
Attending: INTERNAL MEDICINE
Payer: COMMERCIAL

## 2021-08-18 DIAGNOSIS — E87.6 HYPOKALEMIA: ICD-10-CM

## 2021-08-18 DIAGNOSIS — E83.39 HYPOPHOSPHATEMIA: ICD-10-CM

## 2021-08-18 PROCEDURE — 80048 BASIC METABOLIC PNL TOTAL CA: CPT | Performed by: INTERNAL MEDICINE

## 2021-08-18 PROCEDURE — 36415 COLL VENOUS BLD VENIPUNCTURE: CPT | Mod: PO | Performed by: INTERNAL MEDICINE

## 2021-08-18 PROCEDURE — 84100 ASSAY OF PHOSPHORUS: CPT | Performed by: INTERNAL MEDICINE

## 2021-08-18 PROCEDURE — 83735 ASSAY OF MAGNESIUM: CPT | Performed by: INTERNAL MEDICINE

## 2021-08-19 LAB
ANION GAP SERPL CALC-SCNC: 11 MMOL/L (ref 8–16)
BUN SERPL-MCNC: 11 MG/DL (ref 6–20)
CALCIUM SERPL-MCNC: 9.4 MG/DL (ref 8.7–10.5)
CHLORIDE SERPL-SCNC: 99 MMOL/L (ref 95–110)
CO2 SERPL-SCNC: 29 MMOL/L (ref 23–29)
CREAT SERPL-MCNC: 0.7 MG/DL (ref 0.5–1.4)
EST. GFR  (AFRICAN AMERICAN): >60 ML/MIN/1.73 M^2
EST. GFR  (NON AFRICAN AMERICAN): >60 ML/MIN/1.73 M^2
GLUCOSE SERPL-MCNC: 80 MG/DL (ref 70–110)
MAGNESIUM SERPL-MCNC: 1.8 MG/DL (ref 1.6–2.6)
PHOSPHATE SERPL-MCNC: 2.4 MG/DL (ref 2.7–4.5)
POTASSIUM SERPL-SCNC: 3 MMOL/L (ref 3.5–5.1)
SODIUM SERPL-SCNC: 139 MMOL/L (ref 136–145)

## 2021-08-20 ENCOUNTER — TELEPHONE (OUTPATIENT)
Dept: FAMILY MEDICINE | Facility: CLINIC | Age: 51
End: 2021-08-20

## 2021-08-20 ENCOUNTER — LAB VISIT (OUTPATIENT)
Dept: LAB | Facility: HOSPITAL | Age: 51
End: 2021-08-20
Attending: INTERNAL MEDICINE
Payer: COMMERCIAL

## 2021-08-20 DIAGNOSIS — N30.00 ACUTE CYSTITIS WITHOUT HEMATURIA: ICD-10-CM

## 2021-08-20 DIAGNOSIS — N30.00 ACUTE CYSTITIS WITHOUT HEMATURIA: Primary | ICD-10-CM

## 2021-08-20 LAB
BILIRUB UR QL STRIP: NEGATIVE
CLARITY UR: CLEAR
COLOR UR: YELLOW
GLUCOSE UR QL STRIP: NEGATIVE
HGB UR QL STRIP: NEGATIVE
KETONES UR QL STRIP: NEGATIVE
LEUKOCYTE ESTERASE UR QL STRIP: NEGATIVE
NITRITE UR QL STRIP: NEGATIVE
PH UR STRIP: 6.5 [PH] (ref 5–8)
PROT UR QL STRIP: NEGATIVE
SP GR UR STRIP: 1.01 (ref 1–1.03)
URN SPEC COLLECT METH UR: NORMAL

## 2021-08-20 PROCEDURE — 81003 URINALYSIS AUTO W/O SCOPE: CPT | Mod: PO | Performed by: INTERNAL MEDICINE

## 2021-08-22 DIAGNOSIS — E87.6 HYPOKALEMIA: Primary | ICD-10-CM

## 2021-09-15 ENCOUNTER — LAB VISIT (OUTPATIENT)
Dept: LAB | Facility: HOSPITAL | Age: 51
End: 2021-09-15
Attending: INTERNAL MEDICINE
Payer: COMMERCIAL

## 2021-09-15 DIAGNOSIS — E87.6 HYPOKALEMIA: ICD-10-CM

## 2021-09-15 LAB
ANION GAP SERPL CALC-SCNC: 10 MMOL/L (ref 8–16)
BUN SERPL-MCNC: 9 MG/DL (ref 6–20)
CALCIUM SERPL-MCNC: 8.9 MG/DL (ref 8.7–10.5)
CHLORIDE SERPL-SCNC: 101 MMOL/L (ref 95–110)
CO2 SERPL-SCNC: 26 MMOL/L (ref 23–29)
CREAT SERPL-MCNC: 0.7 MG/DL (ref 0.5–1.4)
EST. GFR  (AFRICAN AMERICAN): >60 ML/MIN/1.73 M^2
EST. GFR  (NON AFRICAN AMERICAN): >60 ML/MIN/1.73 M^2
GLUCOSE SERPL-MCNC: 99 MG/DL (ref 70–110)
POTASSIUM SERPL-SCNC: 3.6 MMOL/L (ref 3.5–5.1)
SODIUM SERPL-SCNC: 137 MMOL/L (ref 136–145)

## 2021-09-15 PROCEDURE — 36415 COLL VENOUS BLD VENIPUNCTURE: CPT | Mod: PO | Performed by: INTERNAL MEDICINE

## 2021-09-15 PROCEDURE — 80048 BASIC METABOLIC PNL TOTAL CA: CPT | Performed by: INTERNAL MEDICINE

## 2022-02-09 ENCOUNTER — TELEPHONE (OUTPATIENT)
Dept: FAMILY MEDICINE | Facility: CLINIC | Age: 52
End: 2022-02-09
Payer: COMMERCIAL

## 2022-02-09 ENCOUNTER — OFFICE VISIT (OUTPATIENT)
Dept: FAMILY MEDICINE | Facility: CLINIC | Age: 52
End: 2022-02-09
Payer: COMMERCIAL

## 2022-02-09 VITALS
TEMPERATURE: 98 F | DIASTOLIC BLOOD PRESSURE: 76 MMHG | BODY MASS INDEX: 28.17 KG/M2 | HEIGHT: 64 IN | SYSTOLIC BLOOD PRESSURE: 108 MMHG | HEART RATE: 85 BPM | WEIGHT: 165 LBS

## 2022-02-09 DIAGNOSIS — R41.840 INATTENTION: ICD-10-CM

## 2022-02-09 DIAGNOSIS — Z23 NEED FOR TDAP VACCINATION: ICD-10-CM

## 2022-02-09 DIAGNOSIS — K21.9 GASTROESOPHAGEAL REFLUX DISEASE WITHOUT ESOPHAGITIS: ICD-10-CM

## 2022-02-09 DIAGNOSIS — Z12.11 COLON CANCER SCREENING: ICD-10-CM

## 2022-02-09 DIAGNOSIS — R60.9 EDEMA, UNSPECIFIED TYPE: ICD-10-CM

## 2022-02-09 DIAGNOSIS — E87.6 HYPOKALEMIA: Primary | ICD-10-CM

## 2022-02-09 DIAGNOSIS — R41.840 LACK OF CONCENTRATION: Primary | ICD-10-CM

## 2022-02-09 DIAGNOSIS — E87.6 HYPOKALEMIA: ICD-10-CM

## 2022-02-09 PROCEDURE — 90471 TDAP VACCINE GREATER THAN OR EQUAL TO 7YO IM: ICD-10-PCS | Mod: S$GLB,,, | Performed by: INTERNAL MEDICINE

## 2022-02-09 PROCEDURE — 90715 TDAP VACCINE 7 YRS/> IM: CPT | Mod: S$GLB,,, | Performed by: INTERNAL MEDICINE

## 2022-02-09 PROCEDURE — 90471 IMMUNIZATION ADMIN: CPT | Mod: S$GLB,,, | Performed by: INTERNAL MEDICINE

## 2022-02-09 PROCEDURE — 90715 TDAP VACCINE GREATER THAN OR EQUAL TO 7YO IM: ICD-10-PCS | Mod: S$GLB,,, | Performed by: INTERNAL MEDICINE

## 2022-02-09 PROCEDURE — 99999 PR PBB SHADOW E&M-EST. PATIENT-LVL IV: CPT | Mod: PBBFAC,,, | Performed by: INTERNAL MEDICINE

## 2022-02-09 PROCEDURE — 99214 OFFICE O/P EST MOD 30 MIN: CPT | Mod: 25,S$GLB,, | Performed by: INTERNAL MEDICINE

## 2022-02-09 PROCEDURE — 99214 PR OFFICE/OUTPT VISIT, EST, LEVL IV, 30-39 MIN: ICD-10-PCS | Mod: 25,S$GLB,, | Performed by: INTERNAL MEDICINE

## 2022-02-09 PROCEDURE — 99999 PR PBB SHADOW E&M-EST. PATIENT-LVL IV: ICD-10-PCS | Mod: PBBFAC,,, | Performed by: INTERNAL MEDICINE

## 2022-02-09 NOTE — TELEPHONE ENCOUNTER
----- Message from Ruby Sage sent at 2/9/2022  9:45 AM CST -----  Patient checked in for labs, but decided to leave, will need BMP order put back in.    
I have signed for the following orders AND/OR meds.  Please call the patient and ask the patient to schedule the testing AND/OR inform about any medications that were sent. Medications have been sent to pharmacy listed below      Orders Placed This Encounter   Procedures    Basic Metabolic Panel     Standing Status:   Future     Standing Expiration Date:   4/10/2023              [facility] [place] [facility] [address] [address]  [id number] [address]  [place] [address]  Phone: [phone] Fax: [phone]    
[phone]

## 2022-02-09 NOTE — PROGRESS NOTES
Assessment/Plan:    Problem List Items Addressed This Visit        GI    Gastroesophageal reflux disease without esophagitis    Overview     -symptoms controlled with daily PPI  -denies alarm symptoms, such as dysphagia, weight loss or N/V  -continue lifestyle modification with avoidance of acidic foods, caffeine, late night eating             Other Visit Diagnoses     Lack of concentration    -  Primary    Relevant Orders    Ambulatory referral/consult to Psychiatry    Inattention        Relevant Orders    Ambulatory referral/consult to Psychiatry    Hypokalemia        Colon cancer screening        Relevant Orders    Case Request Endoscopy: COLONOSCOPY (Completed)    Need for Tdap vaccination        Relevant Orders    Tdap Vaccine (Completed)          Follow up in about 6 months (around 8/9/2022).    Dacia King MD  _____________________________________________________________________________________________________________________________________________________    CC: follow up of chronic medical conditions     HPI:    Patient is in clinic today as an established patient here for follow up of chronic medical conditions.    Patient requesting testing due to concern for ADHD. Reports difficulty with concentration and difficulty completing tasks. She was told by family members she should be checked for ADHD. Discussed psychiatry evaluation for formal testing.     Remaining chronic conditions have been reviewed and remain stable. Further detail as stated above.     HM reviewed. C-scope ordered. tdap today.     Past Medical History:  History reviewed. No pertinent past medical history.  Past Surgical History:   Procedure Laterality Date    ABDOMINOPLASTY      BILATERAL TUBAL LIGATION      COSMETIC SURGERY  August 2016    Tummy tuck    TOTAL ABDOMINAL HYSTERECTOMY      TUBAL LIGATION  June 1997     Review of patient's allergies indicates:   Allergen Reactions    Sulfa (sulfonamide antibiotics) Itching  "    Social History     Tobacco Use    Smoking status: Never Smoker    Smokeless tobacco: Never Used   Substance Use Topics    Alcohol use: Not Currently     Alcohol/week: 0.0 standard drinks    Drug use: Not Currently     Family History   Problem Relation Age of Onset    Diabetes Mother     Heart disease Mother     Diabetes Father     Heart disease Father     Early death Brother         Blood clot after surgery    Colon cancer Maternal Grandmother      Current Outpatient Medications on File Prior to Visit   Medication Sig Dispense Refill    biotin/calcium carbonate (BIOTIN 100+10 ORAL) Take by mouth.      hydroCHLOROthiazide (HYDRODIURIL) 50 MG tablet Take 50 mg by mouth once daily.      pantoprazole (PROTONIX) 40 MG tablet Take 1 tablet (40 mg total) by mouth once daily. 30 tablet 11    potassium chloride (MICRO-K) 10 MEQ CpSR Take 2 capsules (20 mEq total) by mouth once daily. 60 capsule 6     No current facility-administered medications on file prior to visit.       Review of Systems   Constitutional: Negative for chills, diaphoresis, fatigue and fever.   HENT: Negative for congestion, ear pain, postnasal drip, sinus pain and sore throat.    Eyes: Negative for pain and redness.   Respiratory: Negative for cough, chest tightness and shortness of breath.    Cardiovascular: Negative for chest pain and leg swelling.   Gastrointestinal: Negative for abdominal pain, constipation, diarrhea, nausea and vomiting.   Genitourinary: Negative for dysuria and hematuria.   Musculoskeletal: Negative for arthralgias and joint swelling.   Skin: Negative for rash.   Neurological: Negative for dizziness, syncope and headaches.   Psychiatric/Behavioral: Positive for decreased concentration. The patient is not nervous/anxious and is not hyperactive.        Vitals:    02/09/22 0859   BP: 108/76   Pulse: 85   Temp: 97.8 °F (36.6 °C)   Weight: 74.8 kg (165 lb)   Height: 5' 4" (1.626 m)       Wt Readings from Last 3 " Encounters:   02/09/22 74.8 kg (165 lb)   07/29/21 77.1 kg (170 lb)   05/13/21 74.6 kg (164 lb 7.4 oz)       Physical Exam  Constitutional:       General: She is not in acute distress.     Appearance: Normal appearance. She is well-developed.   HENT:      Head: Normocephalic and atraumatic.   Eyes:      Conjunctiva/sclera: Conjunctivae normal.   Cardiovascular:      Rate and Rhythm: Normal rate and regular rhythm.      Pulses: Normal pulses.      Heart sounds: Normal heart sounds. No murmur heard.      Pulmonary:      Effort: Pulmonary effort is normal. No respiratory distress.      Breath sounds: Normal breath sounds.   Abdominal:      General: Bowel sounds are normal. There is no distension.      Palpations: Abdomen is soft.      Tenderness: There is no abdominal tenderness.   Musculoskeletal:         General: Normal range of motion.      Cervical back: Normal range of motion and neck supple.   Skin:     General: Skin is warm and dry.      Findings: No rash.   Neurological:      General: No focal deficit present.      Mental Status: She is alert and oriented to person, place, and time.   Psychiatric:         Mood and Affect: Mood normal.         Behavior: Behavior normal.         Health Maintenance   Topic Date Due    Mammogram  03/18/2022    Lipid Panel  08/04/2026    TETANUS VACCINE  02/09/2032    Hepatitis C Screening  Completed

## 2022-02-14 ENCOUNTER — TELEPHONE (OUTPATIENT)
Dept: GASTROENTEROLOGY | Facility: CLINIC | Age: 52
End: 2022-02-14
Payer: COMMERCIAL

## 2022-02-14 DIAGNOSIS — Z01.818 PRE-OP TESTING: ICD-10-CM

## 2022-02-14 NOTE — TELEPHONE ENCOUNTER
Pt scheduled for scope. Instructions mailed to home and sent via [employer]. Pt verbalized understanding of preop COVID test requirement.     Statement Selected

## 2022-02-21 RX ORDER — HYDROCHLOROTHIAZIDE 25 MG/1
25 TABLET ORAL DAILY
Qty: 30 TABLET | Refills: 11 | Status: SHIPPED | OUTPATIENT
Start: 2022-02-21 | End: 2022-03-13

## 2022-02-23 ENCOUNTER — OFFICE VISIT (OUTPATIENT)
Dept: PSYCHIATRY | Facility: CLINIC | Age: 52
End: 2022-02-23
Payer: COMMERCIAL

## 2022-02-23 VITALS
DIASTOLIC BLOOD PRESSURE: 86 MMHG | WEIGHT: 168.88 LBS | HEART RATE: 71 BPM | HEIGHT: 64 IN | BODY MASS INDEX: 28.83 KG/M2 | SYSTOLIC BLOOD PRESSURE: 125 MMHG

## 2022-02-23 DIAGNOSIS — F90.0 ADHD (ATTENTION DEFICIT HYPERACTIVITY DISORDER), INATTENTIVE TYPE: Primary | ICD-10-CM

## 2022-02-23 PROCEDURE — 1159F PR MEDICATION LIST DOCUMENTED IN MEDICAL RECORD: ICD-10-PCS | Mod: CPTII,S$GLB,,

## 2022-02-23 PROCEDURE — 1160F RVW MEDS BY RX/DR IN RCRD: CPT | Mod: CPTII,S$GLB,,

## 2022-02-23 PROCEDURE — 1159F MED LIST DOCD IN RCRD: CPT | Mod: CPTII,S$GLB,,

## 2022-02-23 PROCEDURE — 99999 PR PBB SHADOW E&M-EST. PATIENT-LVL III: CPT | Mod: PBBFAC,,,

## 2022-02-23 PROCEDURE — 3008F PR BODY MASS INDEX (BMI) DOCUMENTED: ICD-10-PCS | Mod: CPTII,S$GLB,,

## 2022-02-23 PROCEDURE — 3074F SYST BP LT 130 MM HG: CPT | Mod: CPTII,S$GLB,,

## 2022-02-23 PROCEDURE — 3008F BODY MASS INDEX DOCD: CPT | Mod: CPTII,S$GLB,,

## 2022-02-23 PROCEDURE — 3079F DIAST BP 80-89 MM HG: CPT | Mod: CPTII,S$GLB,,

## 2022-02-23 PROCEDURE — 3074F PR MOST RECENT SYSTOLIC BLOOD PRESSURE < 130 MM HG: ICD-10-PCS | Mod: CPTII,S$GLB,,

## 2022-02-23 PROCEDURE — 3079F PR MOST RECENT DIASTOLIC BLOOD PRESSURE 80-89 MM HG: ICD-10-PCS | Mod: CPTII,S$GLB,,

## 2022-02-23 PROCEDURE — 90792 PR PSYCHIATRIC DIAGNOSTIC EVALUATION W/MEDICAL SERVICES: ICD-10-PCS | Mod: S$GLB,,,

## 2022-02-23 PROCEDURE — 90792 PSYCH DIAG EVAL W/MED SRVCS: CPT | Mod: S$GLB,,,

## 2022-02-23 PROCEDURE — 1160F PR REVIEW ALL MEDS BY PRESCRIBER/CLIN PHARMACIST DOCUMENTED: ICD-10-PCS | Mod: CPTII,S$GLB,,

## 2022-02-23 PROCEDURE — 99999 PR PBB SHADOW E&M-EST. PATIENT-LVL III: ICD-10-PCS | Mod: PBBFAC,,,

## 2022-02-23 RX ORDER — DEXTROAMPHETAMINE SACCHARATE, AMPHETAMINE ASPARTATE MONOHYDRATE, DEXTROAMPHETAMINE SULFATE AND AMPHETAMINE SULFATE 2.5; 2.5; 2.5; 2.5 MG/1; MG/1; MG/1; MG/1
10 CAPSULE, EXTENDED RELEASE ORAL EVERY MORNING
Qty: 30 CAPSULE | Refills: 0 | Status: SHIPPED | OUTPATIENT
Start: 2022-02-23 | End: 2022-03-23 | Stop reason: ALTCHOICE

## 2022-02-23 NOTE — PROGRESS NOTES
"  Outpatient Psychiatry Initial Visit  02/23/2022    ID: Anna Marie Lindo, a 51 y.o. female, presenting for initial evaluation visit. Met with patient. Informed of confidentiality rights and limitations. Discussed provider role in the treatment team.    Reason for Encounter: Referral from Dacia King MD     CC: inattention and lack of concentration    History of Present Illness:  Pt. is a 51 y.o. female, with no past psychiatric hx presenting to the clinic for an initial evaluation and treatment. PMHx outlined below. Pt is currently taking no psychotropic medications. Pt notes past trials of alprazolam.       Pt presents today with symptoms of lifelong inattention and concentration deficit causing impairment in her family life as well as her career. She notes her 3 daughters have all been diagnosed and treated for ADHD, however the Pt has never sought treatment.  She notes she is easily distracted and frequently becomes sidetracked both at work and at home. She states multiple coworkers have suggested she be tested for ADHD. Pt reports as a child in school she had to study more than her peers and notes she had to repeat material many times, writing it, stating it out loud, and then repeating it again and again. She notes "When people speak to me it takes a minute to process." She also reports, "Sometimes I can't get my mouth to go as quickly as my thoughts are going." See ADHD screening below.     Pt describes mood as "Good." She notes a history of depression in her family and states she engages in physical activity when she begins to have feelings of sadness or depression. Pt reports she sleeps on average ~7 hours per night and denies difficulty initiating or maintaining sleep.  Pt does report some anxiety, but states this has never interfered with her life. She also notes some mild social anxiety.    ADHD Screening:  Trouble paying close attention to details, or careless mistakes: yes, "if I make mistakes its " "because I didn't pay attn to details"  Difficulty sustaining attention/remaining focused: frequently  Absent minded/wandeing thoughts during conversation: yes  Doesn't follow through on instructions, starts tasks but does not finish or easily distracted: frequently d/t distraction  Difficulty with organizing: occasionally   Avoids/dislikes tasks that require sustained attention: frequently  Looses important things:  Has developed routines and places for important items to go so they don't get lost  Easily distracted by extraneous stimuli: frequently  Forgetful in daily activities: frequently d/t distraction  ------  Often fidgets/squirms: occasionally when anxious  Often leaves seat at inappropriate times: no  Runs around, climbing on things or feeling restless: no  Unable to engage in leisure activities: no  Often "on the go" , motor driven: no  Often talks excessively: yes, mario alberto when nervous  Blurts out, interrupts: frequently  Can't wait turn: no  Often interrupts/intrudes: frequently      Pt currently endorses or denies the following symptoms:  Psych ROS:  Depression: denies depressed mood, anhedonia, appetite/weight changes, insomnia/hypersomnia, fatigue, feelings of worthlessness, hopelessness, or guilt, difficulty concentrating, or recurrent thoughts of death or SI  Erna: denies episodes of expansive mood, decreased need for sleep, increased goal directed behaviors, or racing thoughts  Anxiety: denies panic attacks, agoraphobia, social anxiety, phobias, excessive worry, avoidance, or somatic related complaints   OCD: denies obsessions or compulsive behaviors  PTSD: denies flashbacks, nightmares, or avoidance of stimuli  Psychosis: denies A/V hallucinations or delusions   SI/HI: denies suicidal ideation, plan, or thoughts of harm to self or others "my animals need me"  Access to guns: yes    Past Psychiatric History:  First psych contact:  Today, 2/23/22  Prior hospitalizations: denies  Prior suicide attempts " or self-harm: denies  Prior diagnosis: denies  Prior meds: xanax   Current meds: none  Prior psychotherapy: denies    Past Medical Hx:   Past Medical History:   Diagnosis Date    GERD (gastroesophageal reflux disease)     Hypokalemia     Hormones IM q 3 weeks after hysterectomy    Hx of TBI: denies  Hx of seizures: denies    Past Surgical Hx:  Past Surgical History:   Procedure Laterality Date    ABDOMINOPLASTY      BILATERAL TUBAL LIGATION      COSMETIC SURGERY  August 2016    Tummy tuck    TOTAL ABDOMINAL HYSTERECTOMY      TUBAL LIGATION  June 1997       Family Hx:   Paternal: grandmother alcoholic; no psychiatric history or history of substance abuse or suicide  Maternal: mom undiagnosed psych estranged from kids; no history of substance abuse or suicide    Social Hx:   Childhood: born and raised in [place]  Marital Status:   Children: 3 daughters, all ADHD  Resides: [place]  Occupation:    Hobbies: denita  Zoroastrianism: Mandaeism  Education level: high school graduate  : denies  Legal: denies    Substance Hx:  Caffeine: diet coke, 6 per day  Tobacco: denies  Alcohol: occasional ~2-3 drinks at a time  Drug use: Denied current use.  Denied history of abuse or dependency.  Rehab: denies  Prior/current AA: denies    Medications  Outpatient Encounter Medications as of 2/23/2022   Medication Sig Dispense Refill    biotin/calcium carbonate (BIOTIN 100+10 ORAL) Take by mouth.      hydroCHLOROthiazide (HYDRODIURIL) 25 MG tablet Take 1 tablet (25 mg total) by mouth once daily. 30 tablet 11    pantoprazole (PROTONIX) 40 MG tablet Take 1 tablet (40 mg total) by mouth once daily. 30 tablet 11    potassium chloride (MICRO-K) 10 MEQ CpSR Take 2 capsules (20 mEq total) by mouth once daily. 60 capsule 6    dextroamphetamine-amphetamine (ADDERALL XR) 10 MG 24 hr capsule Take 1 capsule (10 mg total) by mouth every morning. 30 capsule 0     No facility-administered encounter  "medications on file as of 2/23/2022.       Laboratory Data  Lab Visit on 02/09/2022   Component Date Value Ref Range Status    Sodium 02/09/2022 137  136 - 145 mmol/L Final    Potassium 02/09/2022 3.0 (A) 3.5 - 5.1 mmol/L Final    Chloride 02/09/2022 97  95 - 110 mmol/L Final    CO2 02/09/2022 30 (A) 23 - 29 mmol/L Final    Glucose 02/09/2022 79  70 - 110 mg/dL Final    BUN 02/09/2022 10  6 - 20 mg/dL Final    Creatinine 02/09/2022 0.8  0.5 - 1.4 mg/dL Final    Calcium 02/09/2022 9.9  8.7 - 10.5 mg/dL Final    Anion Gap 02/09/2022 10  8 - 16 mmol/L Final    eGFR if African American 02/09/2022 >60.0  >60 mL/min/1.73 m^2 Final    eGFR if non African American 02/09/2022 >60.0  >60 mL/min/1.73 m^2 Final       Review of Systems:  GENERAL: no weight gain/loss  SKIN: no rashes or lacerations  HEAD: no headaches  EYES: no jaundice, blindness. No exophthalmos  EARS: no dizziness, tinnitus, or hearing loss  NOSE: no changes in smell  Mouth/throat: no dyskinetic movements or obvious goiter  CHEST: no SOB, hyperventilation or cough  CARDIO: no tachycardia, bradycardia, or chest pain  ABDOMEN: no nausea, vomiting, pain, constipation, or diarrhea  URINARY:  no frequency or dysuria  ENDOCRINE: No polydipsia, polyuria, no cold/hot intolerance  MUSCULOSKELETAL: no joint pain/stiffness  NEUROLOGIC: no weakness or sensory changes, no seizures, no confusion, memory loss, or forgetfulness, no tremor or abnormal movements      Current Evaluation:     Nutritional Screening: Considering the patient's height and weight, medications, medical history and preferences, should a referral be made to the dietitian? No    Vitals: most recent vital signs, dated less than 90 days prior to this appointment, were reviewed  /86   Pulse 71   Ht 5' 4" (1.626 m)   Wt 76.6 kg (168 lb 14 oz)   BMI 28.99 kg/m²   General: age appropriate, well nourished, casually dressed, neatly groomed  MSK: muscle strength/tone: no tremor or abnormal " "movements.   Gait/Station: no ataxia, steady    Psychiatric:  Speech: Normal rate, rhythm, volume. No latency, no pressured speech  Mood/Affect: euthymic, congruent, and appropriate   Though Process: organized, logical, linear  Thought Content: no suicidal or homicidal ideation, no A/V hallucinations, delusions, or paranoia  Insight: Intact; aware of illness  Judgement: behavior is adequate to circumstances  Orientation: A&O x 4  Memory: Intact for content of interview, 3/3 immediate, 3/3 after 3 mins. Able to recall recent and remote events.  Language: Grossly intact, no aphasias   Concentration: Spells "world" correctly forward & backwards  Knowledge/Intelligence: appropriate to age and level of education.       Suicide Risk Assessment:  Protective factors: age, gender, no prior attempts, no prior hospitalizations, no ongoing substance abuse, no psychosis, , has children, denies SI/intent/plan, seeking treatment, access to treatment, future oriented, good primary support, no access to firearms  Risks: ongoing symptoms of attention and concentration deficit  Patient is a low immediate and long-term risk considering risk factors. Patient denied suicidal or homicidal ideation, plan, or intent.  Patient noted agreement to call 911 and/or present to the nearest emergency department if Pt develops suicidal or homicidal ideation, plan, or intent.        Assessment - Diagnosis - Goals:     Impression:   Anna Marie Lindo is a 51 y.o. female that appears to be a reliable informant and is committed to working towards the goals of the treatment plan. Patient has no psychiatric history. Presents today with symptoms of  ADHD. Pt has a family history of ADHD and all 3 of her children have been diagnosed with and treated for ADHD. Pt denies personal cardiac hx or family hx of sudden cardiac death or structural anomalies.  Discussed risks, benefits, and side effects of Adderall XR including but not limited to risk of abuse " potential, insomnia, anxiety, elevated BP, HR, arrthymias, MI, stroke, & sudden death. Pt verbalized understanding and would like to initiate treatment.    This patient's profile was checked on the [place] Prescription Monitoring Program. No evidence of misuse.    Safe for outpatient tx and no acute safety concerns.      Encounter Diagnosis   Name Primary?    ADHD (attention deficit hyperactivity disorder), inattentive type Yes       Strengths and Liabilities: Strength: Patient accepts guidance/feedback, Strength: Patient is expressive/articulate., Strength: Patient is intelligent., Strength: Patient is motivated for change., Strength: Patient is physically healthy., Strength: Patient has positive support network., Strength: Patient has reasonable judgment.    Treatment Goals:  Specify outcomes written in observable, behavioral terms:     ADHD: Decrease in symptoms of inattentiveness, hyperactivity and impulsiveness. Improvement in concentration as evidenced by enhanced work performance and at home behaviors. Identify skills to aid in managing symptoms including organizational skills. Participate in psychotherapy as indicated. Medication compliance.    Treatment Plan/Recommendations:   · Medication Management: The risks and benefits of medication were discussed with the patient.  · The treatment plan and follow up plan were reviewed with the patient.  · Start Adderall XR 10 mg po q.a.m. for attention and concentration deficit  · Offered referral for individual psychotherapy    ADHD (attention deficit hyperactivity disorder), inattentive type  -     Ambulatory referral/consult to Psychiatry  -     dextroamphetamine-amphetamine (ADDERALL XR) 10 MG 24 hr capsule; Take 1 capsule (10 mg total) by mouth every morning.  Dispense: 30 capsule; Refill: 0        Labs: no new orders     Review records: Reviewed past records regarding symptoms and treatments that have brought the patient to today's visit.     Return to  Clinic: 1 month  Counseling time: 35 mins  Total time: 60 mins    - Patient given contact number for psychotherapists at [facility] and also instructed they may check with their health insurance provider for a list of providers.   - Call to report any worsening of symptoms or problems associated with medication.  - Pt instructed to go to ER or call 911 if thoughts of harming self or others arise.   - Consents signed for prescribing a controlled substance.   - Spent 60 minutes face to face with the patient; >50% time spent in counseling   - Supportive therapy and psychoeducation provided.  - Questions were sought and answered to the Pt's stated verbal satisfaction.  - Risks/benefits/side effects of medications discussed with the patient who expresses understanding and chooses to take medications as prescribed.   - Patient instructed to call clinic, 911, or go to nearest emergency room if symptoms worsen or if patient is in crisis. The patient expresses understanding.    VLADIMIR Bradford, PMHNP-BC  Department of Psychiatry - [facility]  [address]  [place] [address]  Office: [phone]  Fax: [phone]

## 2022-03-09 ENCOUNTER — LAB VISIT (OUTPATIENT)
Dept: LAB | Facility: HOSPITAL | Age: 52
End: 2022-03-09
Attending: INTERNAL MEDICINE
Payer: COMMERCIAL

## 2022-03-09 DIAGNOSIS — E87.6 HYPOKALEMIA: ICD-10-CM

## 2022-03-09 LAB
ANION GAP SERPL CALC-SCNC: 12 MMOL/L (ref 8–16)
BUN SERPL-MCNC: 9 MG/DL (ref 6–20)
CALCIUM SERPL-MCNC: 9.2 MG/DL (ref 8.7–10.5)
CHLORIDE SERPL-SCNC: 96 MMOL/L (ref 95–110)
CO2 SERPL-SCNC: 31 MMOL/L (ref 23–29)
CREAT SERPL-MCNC: 0.7 MG/DL (ref 0.5–1.4)
EST. GFR  (AFRICAN AMERICAN): >60 ML/MIN/1.73 M^2
EST. GFR  (NON AFRICAN AMERICAN): >60 ML/MIN/1.73 M^2
GLUCOSE SERPL-MCNC: 94 MG/DL (ref 70–110)
POTASSIUM SERPL-SCNC: 3 MMOL/L (ref 3.5–5.1)
SODIUM SERPL-SCNC: 139 MMOL/L (ref 136–145)

## 2022-03-09 PROCEDURE — 36415 COLL VENOUS BLD VENIPUNCTURE: CPT | Mod: PO | Performed by: INTERNAL MEDICINE

## 2022-03-09 PROCEDURE — 80048 BASIC METABOLIC PNL TOTAL CA: CPT | Performed by: INTERNAL MEDICINE

## 2022-03-13 DIAGNOSIS — E87.6 HYPOKALEMIA: ICD-10-CM

## 2022-03-13 DIAGNOSIS — R60.9 EDEMA, UNSPECIFIED TYPE: ICD-10-CM

## 2022-03-13 RX ORDER — POTASSIUM CHLORIDE 750 MG/1
30 CAPSULE, EXTENDED RELEASE ORAL DAILY
Qty: 90 CAPSULE | Refills: 6 | Status: SHIPPED | OUTPATIENT
Start: 2022-03-13 | End: 2022-09-22 | Stop reason: SDUPTHER

## 2022-03-13 RX ORDER — HYDROCHLOROTHIAZIDE 12.5 MG/1
12.5 TABLET ORAL DAILY
Qty: 30 TABLET | Refills: 11 | Status: SHIPPED | OUTPATIENT
Start: 2022-03-13 | End: 2022-03-31

## 2022-03-14 NOTE — PROGRESS NOTES
Results have been released via [employer]. Please verify that these have been viewed by patient. If not, please call patient with results.    Please schedule the following orders:  Bmp in 2 weeks    I have sent a message to them with the following interpretation (see below).    I have reviewed your recent lab results.    Your potassium is still low. I would like to decrease the hydrochlorothiazide to 12.5 mg and increase potassium supplement to 30 meq daily.    Repeat labs in 2 weeks.    Please do not hesitate to call or message with any additional questions or concerns.    Dacia King MD

## 2022-03-23 ENCOUNTER — OFFICE VISIT (OUTPATIENT)
Dept: PSYCHIATRY | Facility: CLINIC | Age: 52
End: 2022-03-23
Payer: COMMERCIAL

## 2022-03-23 ENCOUNTER — TELEPHONE (OUTPATIENT)
Dept: PSYCHIATRY | Facility: CLINIC | Age: 52
End: 2022-03-23
Payer: COMMERCIAL

## 2022-03-23 VITALS
SYSTOLIC BLOOD PRESSURE: 119 MMHG | HEART RATE: 72 BPM | DIASTOLIC BLOOD PRESSURE: 78 MMHG | BODY MASS INDEX: 27.7 KG/M2 | HEIGHT: 64 IN | WEIGHT: 162.25 LBS

## 2022-03-23 DIAGNOSIS — F90.0 ADHD (ATTENTION DEFICIT HYPERACTIVITY DISORDER), INATTENTIVE TYPE: Primary | ICD-10-CM

## 2022-03-23 DIAGNOSIS — Z12.31 OTHER SCREENING MAMMOGRAM: ICD-10-CM

## 2022-03-23 DIAGNOSIS — F90.0 ADHD (ATTENTION DEFICIT HYPERACTIVITY DISORDER), INATTENTIVE TYPE: ICD-10-CM

## 2022-03-23 PROCEDURE — 90833 PR PSYCHOTHERAPY W/PATIENT W/E&M, 30 MIN (ADD ON): ICD-10-PCS | Mod: S$GLB,,,

## 2022-03-23 PROCEDURE — 90833 PSYTX W PT W E/M 30 MIN: CPT | Mod: S$GLB,,,

## 2022-03-23 PROCEDURE — 99999 PR PBB SHADOW E&M-EST. PATIENT-LVL III: CPT | Mod: PBBFAC,,,

## 2022-03-23 PROCEDURE — 1159F MED LIST DOCD IN RCRD: CPT | Mod: CPTII,S$GLB,,

## 2022-03-23 PROCEDURE — 99214 PR OFFICE/OUTPT VISIT, EST, LEVL IV, 30-39 MIN: ICD-10-PCS | Mod: S$GLB,,,

## 2022-03-23 PROCEDURE — 3074F SYST BP LT 130 MM HG: CPT | Mod: CPTII,S$GLB,,

## 2022-03-23 PROCEDURE — 3008F PR BODY MASS INDEX (BMI) DOCUMENTED: ICD-10-PCS | Mod: CPTII,S$GLB,,

## 2022-03-23 PROCEDURE — 3078F PR MOST RECENT DIASTOLIC BLOOD PRESSURE < 80 MM HG: ICD-10-PCS | Mod: CPTII,S$GLB,,

## 2022-03-23 PROCEDURE — 3078F DIAST BP <80 MM HG: CPT | Mod: CPTII,S$GLB,,

## 2022-03-23 PROCEDURE — 1159F PR MEDICATION LIST DOCUMENTED IN MEDICAL RECORD: ICD-10-PCS | Mod: CPTII,S$GLB,,

## 2022-03-23 PROCEDURE — 99214 OFFICE O/P EST MOD 30 MIN: CPT | Mod: S$GLB,,,

## 2022-03-23 PROCEDURE — 3008F BODY MASS INDEX DOCD: CPT | Mod: CPTII,S$GLB,,

## 2022-03-23 PROCEDURE — 99999 PR PBB SHADOW E&M-EST. PATIENT-LVL III: ICD-10-PCS | Mod: PBBFAC,,,

## 2022-03-23 PROCEDURE — 1160F PR REVIEW ALL MEDS BY PRESCRIBER/CLIN PHARMACIST DOCUMENTED: ICD-10-PCS | Mod: CPTII,S$GLB,,

## 2022-03-23 PROCEDURE — 3074F PR MOST RECENT SYSTOLIC BLOOD PRESSURE < 130 MM HG: ICD-10-PCS | Mod: CPTII,S$GLB,,

## 2022-03-23 PROCEDURE — 1160F RVW MEDS BY RX/DR IN RCRD: CPT | Mod: CPTII,S$GLB,,

## 2022-03-23 RX ORDER — DEXTROAMPHETAMINE SACCHARATE, AMPHETAMINE ASPARTATE MONOHYDRATE, DEXTROAMPHETAMINE SULFATE AND AMPHETAMINE SULFATE 5; 5; 5; 5 MG/1; MG/1; MG/1; MG/1
20 CAPSULE, EXTENDED RELEASE ORAL EVERY MORNING
Qty: 30 CAPSULE | Refills: 0 | Status: SHIPPED | OUTPATIENT
Start: 2022-03-23 | End: 2022-03-23

## 2022-03-23 RX ORDER — DEXTROAMPHETAMINE SACCHARATE, AMPHETAMINE ASPARTATE MONOHYDRATE, DEXTROAMPHETAMINE SULFATE AND AMPHETAMINE SULFATE 5; 5; 5; 5 MG/1; MG/1; MG/1; MG/1
20 CAPSULE, EXTENDED RELEASE ORAL EVERY MORNING
Qty: 30 CAPSULE | Refills: 0 | Status: SHIPPED | OUTPATIENT
Start: 2022-03-23 | End: 2022-04-27 | Stop reason: SDUPTHER

## 2022-03-23 NOTE — PROGRESS NOTES
Outpatient Psychiatry Follow-Up Visit (MD/NP)    3/23/2022    Clinical Status of Patient:  Outpatient (Ambulatory)    Chief Complaint:  Anna Marie Lindo is a 51 y.o. female who presents today for follow-up of attention problems.  Met with patient.      Interval History and Content of Current Session:  Interim Events/Subjective Report/Content of Current Session:     Pt is a 51 y.o. female with past history of ADHD who presents for follow up treatment. Pt established care with me on 2/23/22, where Pt met criteria for ADHD. Pt is currently taking Adderall XR 10 mg po q.a.m..  Pt reports past trials of alprazolam.  Medications tolerated well and provide good relief of symptoms overall.    Today, Pt notes improvement in symptoms of attention and concentration deficit.  She is able to focus through the morning however she states the effects wear off around noon.  She also reports a calming effect after taking the medication, stating that her mind has been calmer during her commute not scattered as previously.     She does note a decrease in appetite as well as xerostomia.  She notes she has increased her water intake.  She does report dry eyes as well stating occasionally her vision blurs but clears upon blinking.  She agrees to notify this provider should her vision not be cleared easily or should her blurred vision worsen.  She denies insomnia and states she feels her sleep has improved since starting Adderall.    Pt denies cardiovascular events including increased heart rate or increased blood pressure, palpitations, chest pain, dizziness, lightheadedness, or syncopal episodes. Pt denies A/V hallucinations or behavioral effects. Pt denies anorexia, headache, insomnia, or digital changes.      Depressive Disorder: DENIES all.  REPORTS none.   Anxiety Disorder: none.   Manic Disorder: DENIES all.   Psychotic Disorder: DENIES all.   Substance Use:  DENIES all.     Initial HPI: Pt. is a 51 y.o. female, with no past  "psychiatric hx presenting to the clinic for an initial evaluation and treatment. PMHx outlined below. Pt is currently taking no psychotropic medications. Pt notes past trials of alprazolam.        Pt presents today with symptoms of lifelong inattention and concentration deficit causing impairment in her family life as well as her career. She notes her 3 daughters have all been diagnosed and treated for ADHD, however the Pt has never sought treatment.  She notes she is easily distracted and frequently becomes sidetracked both at work and at home. She states multiple coworkers have suggested she be tested for ADHD. Pt reports as a child in school she had to study more than her peers and notes she had to repeat material many times, writing it, stating it out loud, and then repeating it again and again. She notes "When people speak to me it takes a minute to process." She also reports, "Sometimes I can't get my mouth to go as quickly as my thoughts are going." See ADHD screening below.     Pt describes mood as "Good." She notes a history of depression in her family and states she engages in physical activity when she begins to have feelings of sadness or depression. Pt reports she sleeps on average ~7 hours per night and denies difficulty initiating or maintaining sleep.  Pt does report some anxiety, but states this has never interfered with her life. She also notes some mild social anxiety.     ADHD Screening:  Trouble paying close attention to details, or careless mistakes: yes, "if I make mistakes its because I didn't pay attn to details"  Difficulty sustaining attention/remaining focused: frequently  Absent minded/wandeing thoughts during conversation: yes  Doesn't follow through on instructions, starts tasks but does not finish or easily distracted: frequently d/t distraction  Difficulty with organizing: occasionally   Avoids/dislikes tasks that require sustained attention: frequently  Looses important things:  Has " "developed routines and places for important items to go so they don't get lost  Easily distracted by extraneous stimuli: frequently  Forgetful in daily activities: frequently d/t distraction  ------  Often fidgets/squirms: occasionally when anxious  Often leaves seat at inappropriate times: no  Runs around, climbing on things or feeling restless: no  Unable to engage in leisure activities: no  Often "on the go" , motor driven: no  Often talks excessively: yes, mario alberto when nervous  Blurts out, interrupts: frequently  Can't wait turn: no  Often interrupts/intrudes: frequently    Initial Psych ROS:  Depression: denies depressed mood, anhedonia, appetite/weight changes, insomnia/hypersomnia, fatigue, feelings of worthlessness, hopelessness, or guilt, difficulty concentrating, or recurrent thoughts of death or SI  Erna: denies episodes of expansive mood, decreased need for sleep, increased goal directed behaviors, or racing thoughts  Anxiety: denies panic attacks, agoraphobia, social anxiety, phobias, excessive worry, avoidance, or somatic related complaints   OCD: denies obsessions or compulsive behaviors  PTSD: denies flashbacks, nightmares, or avoidance of stimuli  Psychosis: denies A/V hallucinations or delusions   SI/HI: denies suicidal ideation, plan, or thoughts of harm to self or others "my animals need me"  Access to guns: yes     Past Psychiatric History:  First psych contact:  Today, 2/23/22  Prior hospitalizations: denies  Prior suicide attempts or self-harm: denies  Prior diagnosis: denies  Prior meds: xanax   Current meds: none  Prior psychotherapy: denies    Past Medical hx:   Past Medical History:   Diagnosis Date    GERD (gastroesophageal reflux disease)     Hypokalemia       Hx of TBI: denies  Hx of seizures: denies    Past Surgical hx:   Past Surgical History:   Procedure Laterality Date    ABDOMINOPLASTY      BILATERAL TUBAL LIGATION      COSMETIC SURGERY  August 2016    Tummy tuck    TOTAL " ABDOMINAL HYSTERECTOMY      TUBAL LIGATION  June 1997       Family Hx:   Paternal: grandmother alcoholic; no psychiatric history or history of substance abuse or suicide  Maternal: mom undiagnosed psych estranged from kids; no history of substance abuse or suicide     Social Hx:   Childhood: born and raised in [place]  Marital Status:   Children: 3 daughters, all ADHD  Resides: [place]  Occupation:    Hobbies: dneita  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  Denied history of abuse or dependency.  Rehab: denies  Prior/current AA: denies      Interim hx:     Medication changes last visit: Started Adderall XR 10 mg po q.a.m.     Anxiety: denies  Depression: denies     Denies suicidal/homicidal ideations.  Denies hopelessness/worthlessness.    Denies auditory/visual hallucinations      Alcohol/Drugs/Caffeine/Tobacco: No change in consumption      Review of Systems   · PSYCHIATRIC: Pertinant items are noted in the narrative.  · All other systems reviewed and found to be negative       Past Medical, Family and Social History: The patient's past medical, family and social history have been reviewed and updated as appropriate within the electronic medical record - see encounter notes.    Medications:  Adderall XR 10 mg po q.a.m.     Compliance: yes    Side effects: see above    Risk Parameters:  Patient reports no suicidal ideation  Patient reports no homicidal ideation  Patient reports no self-injurious behavior  Patient reports no violent behavior    Exam   Constitutional  Vitals:  Most recent vital signs, dated less than 90 days prior to this appointment, were reviewed.   Last 3 sets of Vitals    Vitals - 1 value per visit 2/23/2022 3/23/2022 3/23/2022   SYSTOLIC 125 - 119   DIASTOLIC 86 -  78   Pulse 71 - 72   Temp - - -   Resp - - -   SPO2 - - -   Weight (lb) 168.87 - 162.26   Weight (kg) 76.6 - 73.6   Height 64 - 64   BMI (Calculated) 29 - 27.8   VISIT REPORT - - -   Pain Score  - 0 -          General:  unremarkable, age appropriate     Musculoskeletal  Muscle Strength/Tone:  no rigidity, no flaccidity, no dystonia, no tic   Gait & Station:  non-ataxic     Psychiatric  Speech:  no latency; no press   Mood & Affect:  euthymic  congruent and appropriate   Thought Process:  normal and logical   Associations:  intact   Thought Content:  normal, no suicidality, no homicidality, delusions, or paranoia   Insight:  intact   Judgement: behavior is adequate to circumstances   Orientation:  grossly intact   Memory: intact for content of interview   Language: grossly intact   Attention Span & Concentration:  able to focus   Fund of Knowledge:  intact and appropriate to age and level of education     Suicide Risk Assessment:  Protective factors: age, gender, no prior attempts, no prior hospitalizations, no ongoing substance abuse, no psychosis, , has children, denies SI/intent/plan, seeking treatment, access to treatment, future oriented, good primary support, no access to firearms  Risks: ongoing symptoms of attention and concentration deficit  Patient is a low immediate and long-term risk considering risk factors. Patient denied suicidal or homicidal ideation, plan, or intent.  Patient noted agreement to call 911 and/or present to the nearest emergency department if Pt develops suicidal or homicidal ideation, plan, or intent.      Assessment and Diagnosis   Status/Progress: Based on the examination today, the patient's problem(s) is/are improved.  New problems have not been presented today.   Co-morbidities are not complicating management of the primary condition.  There are no active rule-out diagnoses for this patient at this time.     General Impression: Pt is a 51 y.o. female with past history of  ADHD who presents for follow up treatment.  She notes improvement in her symptoms of attention and concentration deficit however she states this improvement wears off around lunchtime. Discussed risks of Adderall XR including but not limited to risk of abuse potential, insomnia, anxiety, elevated BP, HR, arrthymias, MI, stroke, & sudden death.  Through shared decision making with patient Adderall XR will be increased to 20 mg p.o. q.a.m.    This patient's profile was checked on the [place] Prescription Monitoring Program. No aberrant patterns or evidence of misuse.    Safe for outpatient follow up and no acute safety concerns.    Encounter Diagnosis   Name Primary?    ADHD (attention deficit hyperactivity disorder), inattentive type Yes         Intervention/Counseling/Treatment Plan   · Medication Management: The risks and benefits of medication were discussed with the patient. Shared decision making occurred   · The treatment plan and follow up plan were reviewed with the patient.   · Increase Adderall XR to 20 mg p.o. q.a.m. for symptoms of attention and concentration deficit  · Offered referral for individual psychotherapy.  · Counseled on regular exercise, maintenance of a healthy weight, balanced diet rich in fruits/vegetables and lean protein, and avoidance of unhealthy habits like smoking and excessive alcohol intake.  · Call to report any worsening of symptoms or problems with the medication. Pt instructed to go to ER with thoughts of harming self, others  · Labs: no new orders    ADHD (attention deficit hyperactivity disorder), inattentive type  -     dextroamphetamine-amphetamine (ADDERALL XR) 20 MG 24 hr capsule; Take 1 capsule (20 mg total) by mouth every morning.  Dispense: 30 capsule; Refill: 0      Psychotherapy:    Target symptoms: distractability, lack of focus   Outcome monitoring methods: self-report, observation, feedback from family   Therapeutic Intervention Type: supportive  psychotherapy   Why chosen therapy is appropriate versus another modality: relevant to diagnosis, patient responds to this modality, evidence based practice   Patient's response to intervention:The patient's response to intervention is accepting.   Progress toward goals: The patient's progress toward goals is good.   Topics discussed/themes: building skills sets for symptom management, symptom recognition, nutrition, exercise   Duration of intervention: 16 minutes    Return to Clinic: 1 month    -Pt given contact number for psychotherapists at [facility] and also instructed they may check with their health insurance provider for a list of providers  -Spent 30 minutes face to face with the Pt; >50% time spent in counseling   -Questions were sought and answered to the Pt's stated verbal satisfaction.  -Supportive therapy and psychoeducation provided.  -Risks, benefits, and side effects of medications discussed with the Pt who expresses understanding and chooses to take medications as prescribed.   -Pt instructed to call clinic, 911, or go to nearest emergency room if symptoms worsen or pt is in crisis. The Pt expresses understanding.    DISCLAIMER: This note was prepared with [employer] Direct voice recognition transcription software. Garbled syntax, mangled pronouns, and other bizarre constructions may be attributed to that software system     VLADIMIR Bradford, PMHNP-BC  Department of Psychiatry - [facility]  [address] [place] [address]  Office: [phone]  Fax: [phone]

## 2022-03-23 NOTE — TELEPHONE ENCOUNTER
Patient needs adderall xr 20 mg swithed to [facility] on [address] in [place].     [facility] [place] [facility],

## 2022-03-30 ENCOUNTER — LAB VISIT (OUTPATIENT)
Dept: LAB | Facility: HOSPITAL | Age: 52
End: 2022-03-30
Attending: INTERNAL MEDICINE
Payer: COMMERCIAL

## 2022-03-30 DIAGNOSIS — E87.6 HYPOKALEMIA: ICD-10-CM

## 2022-03-30 LAB
ANION GAP SERPL CALC-SCNC: 13 MMOL/L (ref 8–16)
BUN SERPL-MCNC: 6 MG/DL (ref 6–20)
CALCIUM SERPL-MCNC: 9.7 MG/DL (ref 8.7–10.5)
CHLORIDE SERPL-SCNC: 100 MMOL/L (ref 95–110)
CO2 SERPL-SCNC: 27 MMOL/L (ref 23–29)
CREAT SERPL-MCNC: 0.7 MG/DL (ref 0.5–1.4)
EST. GFR  (AFRICAN AMERICAN): >60 ML/MIN/1.73 M^2
EST. GFR  (NON AFRICAN AMERICAN): >60 ML/MIN/1.73 M^2
GLUCOSE SERPL-MCNC: 81 MG/DL (ref 70–110)
POTASSIUM SERPL-SCNC: 3.5 MMOL/L (ref 3.5–5.1)
SODIUM SERPL-SCNC: 140 MMOL/L (ref 136–145)

## 2022-03-30 PROCEDURE — 36415 COLL VENOUS BLD VENIPUNCTURE: CPT | Mod: PO | Performed by: INTERNAL MEDICINE

## 2022-03-30 PROCEDURE — 80048 BASIC METABOLIC PNL TOTAL CA: CPT | Performed by: INTERNAL MEDICINE

## 2022-03-31 ENCOUNTER — PATIENT MESSAGE (OUTPATIENT)
Dept: FAMILY MEDICINE | Facility: CLINIC | Age: 52
End: 2022-03-31
Payer: COMMERCIAL

## 2022-03-31 DIAGNOSIS — R60.9 EDEMA, UNSPECIFIED TYPE: ICD-10-CM

## 2022-03-31 RX ORDER — HYDROCHLOROTHIAZIDE 25 MG/1
25 TABLET ORAL DAILY
Qty: 30 TABLET | Refills: 11 | COMMUNITY
Start: 2022-03-31 | End: 2024-01-21

## 2022-03-31 NOTE — PROGRESS NOTES
Results have been released via [employer]. Please verify that these have been viewed by patient. If not, please call patient with results.    I have sent a message to them with the following interpretation (see below).    I have reviewed your recent lab results.    Electrolytes, including potassium, are now normal. Continue current medication dosages.    Please do not hesitate to call or message with any additional questions or concerns.    Dacia King MD

## 2022-04-22 RX ORDER — CHOLECALCIFEROL (VITAMIN D3) 25 MCG
10000 TABLET ORAL DAILY
COMMUNITY
End: 2023-02-14

## 2022-04-26 ENCOUNTER — PATIENT MESSAGE (OUTPATIENT)
Dept: PSYCHIATRY | Facility: CLINIC | Age: 52
End: 2022-04-26
Payer: COMMERCIAL

## 2022-04-26 ENCOUNTER — HOSPITAL ENCOUNTER (OUTPATIENT)
Facility: HOSPITAL | Age: 52
Discharge: HOME | End: 2022-04-26
Attending: SURGERY | Admitting: SURGERY
Payer: COMMERCIAL

## 2022-04-26 ENCOUNTER — ANESTHESIA EVENT (OUTPATIENT)
Dept: ENDOSCOPY | Facility: HOSPITAL | Age: 52
End: 2022-04-26
Payer: COMMERCIAL

## 2022-04-26 ENCOUNTER — ANESTHESIA (OUTPATIENT)
Dept: ENDOSCOPY | Facility: HOSPITAL | Age: 52
End: 2022-04-26
Payer: COMMERCIAL

## 2022-04-26 DIAGNOSIS — Z12.11 ENCOUNTER FOR SCREENING COLONOSCOPY: Primary | ICD-10-CM

## 2022-04-26 PROCEDURE — 37000008 HC ANESTHESIA 1ST 15 MINUTES: Mod: PO | Performed by: SURGERY

## 2022-04-26 PROCEDURE — D9220A PRA ANESTHESIA: Mod: CRNA,,, | Performed by: NURSE ANESTHETIST, CERTIFIED REGISTERED

## 2022-04-26 PROCEDURE — 37000009 HC ANESTHESIA EA ADD 15 MINS: Mod: PO | Performed by: SURGERY

## 2022-04-26 PROCEDURE — D9220A PRA ANESTHESIA: Mod: ANES,,, | Performed by: ANESTHESIOLOGY

## 2022-04-26 PROCEDURE — 25000003 PHARM REV CODE 250: Mod: PO | Performed by: NURSE ANESTHETIST, CERTIFIED REGISTERED

## 2022-04-26 PROCEDURE — 63600175 PHARM REV CODE 636 W HCPCS: Mod: PO | Performed by: SURGERY

## 2022-04-26 PROCEDURE — G0121 COLON CA SCRN NOT HI RSK IND: ICD-10-PCS | Mod: ,,, | Performed by: SURGERY

## 2022-04-26 PROCEDURE — G0121 COLON CA SCRN NOT HI RSK IND: HCPCS | Mod: PO | Performed by: SURGERY

## 2022-04-26 PROCEDURE — D9220A PRA ANESTHESIA: ICD-10-PCS | Mod: CRNA,,, | Performed by: NURSE ANESTHETIST, CERTIFIED REGISTERED

## 2022-04-26 PROCEDURE — 63600175 PHARM REV CODE 636 W HCPCS: Mod: PO | Performed by: NURSE ANESTHETIST, CERTIFIED REGISTERED

## 2022-04-26 PROCEDURE — D9220A PRA ANESTHESIA: ICD-10-PCS | Mod: ANES,,, | Performed by: ANESTHESIOLOGY

## 2022-04-26 PROCEDURE — G0121 COLON CA SCRN NOT HI RSK IND: HCPCS | Mod: ,,, | Performed by: SURGERY

## 2022-04-26 RX ORDER — PROPOFOL 10 MG/ML
VIAL (ML) INTRAVENOUS CONTINUOUS PRN
Status: DISCONTINUED | OUTPATIENT
Start: 2022-04-26 | End: 2022-04-26

## 2022-04-26 RX ORDER — PROPOFOL 10 MG/ML
VIAL (ML) INTRAVENOUS
Status: DISCONTINUED | OUTPATIENT
Start: 2022-04-26 | End: 2022-04-26

## 2022-04-26 RX ORDER — SODIUM CHLORIDE, SODIUM LACTATE, POTASSIUM CHLORIDE, CALCIUM CHLORIDE 600; 310; 30; 20 MG/100ML; MG/100ML; MG/100ML; MG/100ML
INJECTION, SOLUTION INTRAVENOUS CONTINUOUS
Status: DISCONTINUED | OUTPATIENT
Start: 2022-04-26 | End: 2022-04-26 | Stop reason: HOSPADM

## 2022-04-26 RX ORDER — LIDOCAINE HCL/PF 100 MG/5ML
SYRINGE (ML) INTRAVENOUS
Status: DISCONTINUED | OUTPATIENT
Start: 2022-04-26 | End: 2022-04-26

## 2022-04-26 RX ADMIN — LIDOCAINE HYDROCHLORIDE 100 MG: 20 INJECTION, SOLUTION INTRAVENOUS at 10:04

## 2022-04-26 RX ADMIN — SODIUM CHLORIDE, SODIUM LACTATE, POTASSIUM CHLORIDE, AND CALCIUM CHLORIDE: .6; .31; .03; .02 INJECTION, SOLUTION INTRAVENOUS at 09:04

## 2022-04-26 RX ADMIN — PROPOFOL 150 MCG/KG/MIN: 10 INJECTION, EMULSION INTRAVENOUS at 10:04

## 2022-04-26 RX ADMIN — PROPOFOL 100 MG: 10 INJECTION, EMULSION INTRAVENOUS at 10:04

## 2022-04-26 NOTE — TRANSFER OF CARE
"Anesthesia Transfer of Care Note    Patient: Anna Marie Lindo    Procedure(s) Performed: Procedure(s) (LRB):  COLONOSCOPY (N/A)    Patient location: PACU    Anesthesia Type: general    Transport from OR: Transported from OR on room air with adequate spontaneous ventilation    Post pain: adequate analgesia    Post assessment: no apparent anesthetic complications and tolerated procedure well    Post vital signs: stable    Level of consciousness: responds to stimulation    Nausea/Vomiting: no nausea/vomiting    Complications: none    Transfer of care protocol was followed      Last vitals:   Visit Vitals  /80 (BP Location: Right arm, Patient Position: Lying)   Pulse 70   Temp 36.7 °C (98.1 °F) (Skin)   Resp 16   Ht 5' 4" (1.626 m)   Wt 70.3 kg (155 lb)   SpO2 95%   Breastfeeding No   BMI 26.61 kg/m²     "

## 2022-04-26 NOTE — PROVATION PATIENT INSTRUCTIONS
Discharge Summary/Instructions after an Endoscopic Procedure  Patient Name: Anna Marie Jaeger  Patient MRN: [id number]  Patient YOB: 1970  Tuesday, April 26, 2022  Ubaldo Gutierrez MD  Dear patient,  As a result of recent federal legislation (The Federal Cures Act), you may   receive lab or pathology results from your procedure in your [facility]   account before your physician is able to contact you. Your physician or   their representative will relay the results to you with their   recommendations at their soonest availability.  Thank you,  RESTRICTIONS:  During your procedure today, you received medications for sedation.  These   medications may affect your judgment, balance and coordination.  Therefore,   for 24 hours, you have the following restrictions:   - DO NOT drive a car, operate machinery, make legal/financial decisions,   sign important papers or drink alcohol.    ACTIVITY:  Today: no heavy lifting, straining or running due to procedural   sedation/anesthesia.  The following day: return to full activity including work.  DIET:  Eat and drink normally unless instructed otherwise.     TREATMENT FOR COMMON SIDE EFFECTS:  - Mild abdominal pain, nausea, belching, bloating or excessive gas:  rest,   eat lightly and use a heating pad.  - Sore Throat: treat with throat lozenges and/or gargle with warm salt   water.  - Because air was used during the procedure, expelling large amounts of air   from your rectum or belching is normal.  - If a bowel prep was taken, you may not have a bowel movement for 1-3 days.    This is normal.  SYMPTOMS TO WATCH FOR AND REPORT TO YOUR PHYSICIAN:  1. Abdominal pain or bloating, other than gas cramps.  2. Chest pain.  3. Back pain.  4. Signs of infection such as: chills or fever occurring within 24 hours   after the procedure.  5. Rectal bleeding, which would show as bright red, maroon, or black stools.   (A tablespoon of blood from the rectum is not serious, especially  if   hemorrhoids are present.)  6. Vomiting.  7. Weakness or dizziness.  GO DIRECTLY TO THE NEAREST EMERGENCY ROOM IF YOU HAVE ANY OF THE FOLLOWING:      Difficulty breathing              Chills and/or fever over 101 F   Persistent vomiting and/or vomiting blood   Severe abdominal pain   Severe chest pain   Black, tarry stools   Bleeding- more than one tablespoon   Any other symptom or condition that you feel may need urgent attention  Your doctor recommends these additional instructions:  If any biopsies were taken, your doctors clinic will contact you in 1 to 2   weeks with any results.  You are being discharged to home.   Resume your regular diet.   Continue your present medications.   We are waiting for your pathology results.   Return to my office as needed.  For questions, problems or results please call your physician - Ubaldo Gutierrez MD at Work:  [phone].  EMERGENCY PHONE NUMBER: [phone], LAB RESULTS: [phone]  IF A COMPLICATION OR EMERGENCY SITUATION ARISES AND YOU ARE UNABLE TO REACH   YOUR PHYSICIAN - GO DIRECTLY TO THE EMERGENCY ROOM.  ___________________________________________  Nurse Signature  ___________________________________________  Patient/Designated Responsible Party Signature  Ubaldo Gutierrez MD  4/26/2022 11:06:27 AM  This report has been verified and signed electronically.  Dear patient,  As a result of recent federal legislation (The Federal Cures Act), you may   receive lab or pathology results from your procedure in your [facility]   account before your physician is able to contact you. Your physician or   their representative will relay the results to you with their   recommendations at their soonest availability.  Thank you.  PROVATION

## 2022-04-26 NOTE — ANESTHESIA POSTPROCEDURE EVALUATION
Anesthesia Post Evaluation    Patient: Anna Marie Lindo    Procedure(s) Performed: Procedure(s) (LRB):  COLONOSCOPY (N/A)    Final Anesthesia Type: general      Patient location during evaluation: PACU  Patient participation: Yes- Able to Participate  Level of consciousness: awake and alert  Post-procedure vital signs: reviewed and stable  Pain management: adequate  Airway patency: patent    PONV status at discharge: No PONV  Anesthetic complications: no      Cardiovascular status: blood pressure returned to baseline  Respiratory status: unassisted  Hydration status: euvolemic  Follow-up not needed.          Vitals Value Taken Time   /65 04/26/22 1128   Temp na 04/26/22 1315   Pulse 67 04/26/22 1128   Resp 15 04/26/22 1128   SpO2 98 % 04/26/22 1115         No case tracking events are documented in the log.      Pain/José Score: José Score: 10 (4/26/2022 11:20 AM)

## 2022-04-26 NOTE — ANESTHESIA PREPROCEDURE EVALUATION
"                                                                                                             04/26/2022  Anna Marie Lindo is a 51 y.o., female.      Pre-op Assessment    I have reviewed the Patient Summary Reports.     I have reviewed the Nursing Notes. I have reviewed the NPO Status.   I have reviewed the Medications.     Review of Systems  Anesthesia Hx:  No problems with previous Anesthesia    Social:  Non-Smoker    Cardiovascular:  Cardiovascular Normal     Pulmonary:  Pulmonary Normal    Renal/:  Renal/ Normal     Hepatic/GI:   GERD, well controlled    Neurological:  Neurology Normal    Endocrine:  Endocrine Normal    Psych:   Psychiatric History (ADHD)          Physical Exam  General: Well nourished, Cooperative, Alert and Oriented    Airway:  Mallampati: I   Mouth Opening: Normal  Neck ROM: Normal ROM        Anesthesia Plan  Type of Anesthesia, risks & benefits discussed:    Anesthesia Type: Gen ETT, Gen Supraglottic Airway, Gen Natural Airway, MAC  Intra-op Monitoring Plan: Standard ASA Monitors  Post Op Pain Control Plan: multimodal analgesia  Induction:  IV  Airway Plan: Direct, Video and Fiberoptic, Post-Induction  Informed Consent: Informed consent signed with the Patient and all parties understand the risks and agree with anesthesia plan.  All questions answered.   ASA Score: 2  Anesthesia Plan Notes: "I'm a lightweight"    Ready For Surgery From Anesthesia Perspective.     .      "

## 2022-04-26 NOTE — H&P
[facility] - Endoscopy  History & Physical     Subjective:     Chief Complaint/Reason for Admission: screening colonoscopy    History of Present Illness:   Patient 51 y.o. female presents for screening colonoscopy.  PT has never had a cscope.  NO abd pain.  No changes in bowel habits.  SHe has no significant PMHx.  PShx significant for hysterectomy.       Patient Active Problem List    Diagnosis Date Noted    ADHD (attention deficit hyperactivity disorder), inattentive type 02/23/2022    Gastroesophageal reflux disease without esophagitis 02/09/2022        Medications Prior to Admission   Medication Sig Dispense Refill Last Dose    biotin/calcium carbonate (BIOTIN 100+10 ORAL) Take by mouth.   4/25/2022 at Unknown time    dextroamphetamine-amphetamine (ADDERALL XR) 20 MG 24 hr capsule Take 1 capsule (20 mg total) by mouth every morning. 30 capsule 0 4/25/2022 at Unknown time    hydroCHLOROthiazide (HYDRODIURIL) 25 MG tablet Take 1 tablet (25 mg total) by mouth once daily. 30 tablet 11 4/25/2022 at Unknown time    pantoprazole (PROTONIX) 40 MG tablet Take 1 tablet (40 mg total) by mouth once daily. 30 tablet 11 4/25/2022 at Unknown time    potassium chloride (MICRO-K) 10 MEQ CpSR Take 3 capsules (30 mEq total) by mouth once daily. 90 capsule 6 4/25/2022 at Unknown time    vitamin D (VITAMIN D3) 1000 units Tab Take 10,000 Units by mouth once daily.   4/25/2022 at Unknown time     Review of patient's allergies indicates:   Allergen Reactions    Sulfa (sulfonamide antibiotics) Itching        Past Medical History:   Diagnosis Date    GERD (gastroesophageal reflux disease)     Hypokalemia       Past Surgical History:   Procedure Laterality Date    ABDOMINOPLASTY      BILATERAL TUBAL LIGATION      COSMETIC SURGERY  August 2016    Tummy tuck    TOTAL ABDOMINAL HYSTERECTOMY      TUBAL LIGATION  June 1997      Family History   Problem Relation Age of Onset    Diabetes Mother     Heart disease Mother      Diabetes Father     Heart disease Father     Early death Brother         Blood clot after surgery    Colon cancer Maternal Grandmother       Social History     Tobacco Use    Smoking status: Never Smoker    Smokeless tobacco: Never Used   Substance Use Topics    Alcohol use: Not Currently     Alcohol/week: 0.0 standard drinks        Review of Systems:  A comprehensive review of systems was negative.    OBJECTIVE:     Patient Vitals for the past 8 hrs:   BP Temp Temp src Pulse Resp SpO2   04/26/22 0921 118/80 98.1 °F (36.7 °C) Skin 70 16 95 %     AAOx3  CTA B  Soft/nt/nd      Data Review:      ASSESSMENT/PLAN:     There are no hospital problems to display for this patient.    Screening cscope  Plan:  To have screening cscope today

## 2022-04-27 ENCOUNTER — OFFICE VISIT (OUTPATIENT)
Dept: PSYCHIATRY | Facility: CLINIC | Age: 52
End: 2022-04-27
Payer: COMMERCIAL

## 2022-04-27 VITALS
HEART RATE: 67 BPM | BODY MASS INDEX: 26.46 KG/M2 | OXYGEN SATURATION: 98 % | HEIGHT: 64 IN | RESPIRATION RATE: 15 BRPM | SYSTOLIC BLOOD PRESSURE: 102 MMHG | DIASTOLIC BLOOD PRESSURE: 65 MMHG | WEIGHT: 155 LBS | TEMPERATURE: 98 F

## 2022-04-27 DIAGNOSIS — F90.0 ADHD (ATTENTION DEFICIT HYPERACTIVITY DISORDER), INATTENTIVE TYPE: Primary | ICD-10-CM

## 2022-04-27 PROCEDURE — 1160F RVW MEDS BY RX/DR IN RCRD: CPT | Mod: CPTII,95,,

## 2022-04-27 PROCEDURE — 1159F MED LIST DOCD IN RCRD: CPT | Mod: CPTII,95,,

## 2022-04-27 PROCEDURE — 99214 OFFICE O/P EST MOD 30 MIN: CPT | Mod: 95,,,

## 2022-04-27 PROCEDURE — 99214 PR OFFICE/OUTPT VISIT, EST, LEVL IV, 30-39 MIN: ICD-10-PCS | Mod: 95,,,

## 2022-04-27 PROCEDURE — 90833 PR PSYCHOTHERAPY W/PATIENT W/E&M, 30 MIN (ADD ON): ICD-10-PCS | Mod: 95,,,

## 2022-04-27 PROCEDURE — 90833 PSYTX W PT W E/M 30 MIN: CPT | Mod: 95,,,

## 2022-04-27 PROCEDURE — 1160F PR REVIEW ALL MEDS BY PRESCRIBER/CLIN PHARMACIST DOCUMENTED: ICD-10-PCS | Mod: CPTII,95,,

## 2022-04-27 PROCEDURE — 1159F PR MEDICATION LIST DOCUMENTED IN MEDICAL RECORD: ICD-10-PCS | Mod: CPTII,95,,

## 2022-04-27 RX ORDER — DEXTROAMPHETAMINE SACCHARATE, AMPHETAMINE ASPARTATE MONOHYDRATE, DEXTROAMPHETAMINE SULFATE AND AMPHETAMINE SULFATE 5; 5; 5; 5 MG/1; MG/1; MG/1; MG/1
20 CAPSULE, EXTENDED RELEASE ORAL EVERY MORNING
Qty: 90 CAPSULE | Refills: 0 | Status: SHIPPED | OUTPATIENT
Start: 2022-04-27 | End: 2022-07-27

## 2022-04-27 NOTE — PROGRESS NOTES
Outpatient Psychiatry Follow-Up Visit (MD/NP)    4/27/2022    Clinical Status of Patient:  Outpatient (Virtual)  The patient location is: [facility] [place] [address]  The patient location [place] is: [place]  The patient phone number is: [phone]   Visit type: Virtual visit with synchronous audio and video  Each patient to whom he or she provides medical services by telemedicine is:  (1) informed of the relationship between the practitioner and patient and the respective role of any other health care provider with respect to management of the patient; and (2) notified that he or she may decline to receive medical services by telemedicine and may withdraw from such care at any time.    Chief Complaint:  Anna Marie Lindo is a 51 y.o. female who presents today for follow-up of attention problems.  Met with patient.      Interval History and Content of Current Session:  Interim Events/Subjective Report/Content of Current Session:     Pt is a 51 y.o. female with past history of ADHD who presents for follow up treatment. Pt established care with me on 2/23/22, where Pt met criteria for ADHD. Pt is currently taking Adderall XR 20 mg po q.a.m..  Pt reports past trials of alprazolam.  Medications tolerated well and provide good relief of symptoms overall.    Today, Pt notes improvement in symptoms of attention and concentration deficit.  She is able to focus throughout the day.  Patient states I feel like this is the right dose for me.    She notes her appetite is at baseline and states xerostomia has improved.  She continues with increased water intake.  She denies vision problems in the interim.  She agrees to notify this provider should she experience vision problems again in the future.  She denies insomnia and states she feels her sleep has improved since starting Adderall.    Pt denies cardiovascular events including increased heart rate or increased blood pressure, palpitations, chest pain,  "dizziness, lightheadedness, or syncopal episodes. Pt denies A/V hallucinations or behavioral effects. Pt denies anorexia, headache, insomnia, or digital changes.      Depressive Disorder: DENIES all.  REPORTS none.   Anxiety Disorder: none.   Manic Disorder: DENIES all.   Psychotic Disorder: DENIES all.   Substance Use:  DENIES all.     Initial HPI: Pt. is a 51 y.o. female, with no past psychiatric hx presenting to the clinic for an initial evaluation and treatment. PMHx outlined below. Pt is currently taking no psychotropic medications. Pt notes past trials of alprazolam.        Pt presents today with symptoms of lifelong inattention and concentration deficit causing impairment in her family life as well as her career. She notes her 3 daughters have all been diagnosed and treated for ADHD, however the Pt has never sought treatment.  She notes she is easily distracted and frequently becomes sidetracked both at work and at home. She states multiple coworkers have suggested she be tested for ADHD. Pt reports as a child in school she had to study more than her peers and notes she had to repeat material many times, writing it, stating it out loud, and then repeating it again and again. She notes "When people speak to me it takes a minute to process." She also reports, "Sometimes I can't get my mouth to go as quickly as my thoughts are going." See ADHD screening below.     Pt describes mood as "Good." She notes a history of depression in her family and states she engages in physical activity when she begins to have feelings of sadness or depression. Pt reports she sleeps on average ~7 hours per night and denies difficulty initiating or maintaining sleep.  Pt does report some anxiety, but states this has never interfered with her life. She also notes some mild social anxiety.     ADHD Screening:  Trouble paying close attention to details, or careless mistakes: yes, "if I make mistakes its because I didn't pay attn to " "details"  Difficulty sustaining attention/remaining focused: frequently  Absent minded/wandeing thoughts during conversation: yes  Doesn't follow through on instructions, starts tasks but does not finish or easily distracted: frequently d/t distraction  Difficulty with organizing: occasionally   Avoids/dislikes tasks that require sustained attention: frequently  Looses important things:  Has developed routines and places for important items to go so they don't get lost  Easily distracted by extraneous stimuli: frequently  Forgetful in daily activities: frequently d/t distraction  ------  Often fidgets/squirms: occasionally when anxious  Often leaves seat at inappropriate times: no  Runs around, climbing on things or feeling restless: no  Unable to engage in leisure activities: no  Often "on the go" , motor driven: no  Often talks excessively: yes, mario alberto when nervous  Blurts out, interrupts: frequently  Can't wait turn: no  Often interrupts/intrudes: frequently    Initial Psych ROS:  Depression: denies depressed mood, anhedonia, appetite/weight changes, insomnia/hypersomnia, fatigue, feelings of worthlessness, hopelessness, or guilt, difficulty concentrating, or recurrent thoughts of death or SI  Erna: denies episodes of expansive mood, decreased need for sleep, increased goal directed behaviors, or racing thoughts  Anxiety: denies panic attacks, agoraphobia, social anxiety, phobias, excessive worry, avoidance, or somatic related complaints   OCD: denies obsessions or compulsive behaviors  PTSD: denies flashbacks, nightmares, or avoidance of stimuli  Psychosis: denies A/V hallucinations or delusions   SI/HI: denies suicidal ideation, plan, or thoughts of harm to self or others "my animals need me"  Access to guns: yes     Past Psychiatric History:  First psych contact:  Today, 2/23/22  Prior hospitalizations: denies  Prior suicide attempts or self-harm: denies  Prior diagnosis: denies  Prior meds: xanax   Current " meds: none  Prior psychotherapy: denies    Past Medical hx:   Past Medical History:   Diagnosis Date    GERD (gastroesophageal reflux disease)     Hypokalemia       Hx of TBI: denies  Hx of seizures: denies    Past Surgical hx:   Past Surgical History:   Procedure Laterality Date    ABDOMINOPLASTY      BILATERAL TUBAL LIGATION      COSMETIC SURGERY  August 2016    Tummy tuck    TOTAL ABDOMINAL HYSTERECTOMY      TUBAL LIGATION  June 1997       Family Hx:   Paternal: grandmother alcoholic; no psychiatric history or history of substance abuse or suicide  Maternal: mom undiagnosed psych estranged from kids; no history of substance abuse or suicide     Social Hx:   Childhood: born and raised in [place]  Marital Status:   Children: 3 daughters, all ADHD  Resides: [place]  Occupation:    Hobbies: denita  Uatsdin: Yazidi  Education level: high school graduate  : denies  Legal: denies     Substance Hx:  Caffeine: diet coke, 6 per day  Tobacco: denies  Alcohol: occasional ~2-3 drinks at a time  Drug use: Denied current use.  Denied history of abuse or dependency.  Rehab: denies  Prior/current AA: denies      Interim hx:     Medication changes last visit:  3/23/2022  · Increase Adderall XR to 20 mg p.o. q.a.m. for symptoms of attention and concentration deficit    Medication changes on 2/23/2022:  · Started Adderall XR 10 mg po q.a.m.     Anxiety: denies  Depression: denies     Alcohol/Drugs/Caffeine/Tobacco: No change in consumption      Review of Systems   · PSYCHIATRIC: Pertinant items are noted in the narrative.  · All other systems reviewed and found to be negative       Past Medical, Family and Social History: The patient's past medical, family and social history have been reviewed and updated as appropriate within the electronic medical record - see encounter notes.    Medications:  Adderall XR 20 mg po q.a.m.     Compliance: yes    Side effects: see  above    Risk Parameters:  Patient reports no suicidal ideation  Patient reports no homicidal ideation  Patient reports no self-injurious behavior  Patient reports no violent behavior    Exam   Constitutional  Vitals:  Most recent vital signs, dated less than 90 days prior to this appointment, were reviewed.   Last 3 sets of Vitals    Vitals - 1 value per visit 4/26/2022 4/26/2022 4/27/2022   SYSTOLIC 114 102 -   DIASTOLIC 71 65 -   Pulse 62 67 -   Temp - - -   Resp 15 15 -   SPO2 98 - -   Weight (lb) - - -   Weight (kg) - - -   Height - - -   BMI (Calculated) - - -   VISIT REPORT - - -   Pain Score  - - 0          General:  unremarkable, age appropriate     Musculoskeletal  Muscle Strength/Tone:  no rigidity, no flaccidity, no dystonia, no tic   Gait & Station:  non-ataxic     Psychiatric  Speech:  no latency; no press   Mood & Affect:  euthymic  congruent and appropriate   Thought Process:  normal and logical   Associations:  intact   Thought Content:  normal, no suicidality, no homicidality, delusions, or paranoia   Insight:  intact   Judgement: behavior is adequate to circumstances   Orientation:  grossly intact   Memory: intact for content of interview   Language: grossly intact   Attention Span & Concentration:  able to focus   Fund of Knowledge:  intact and appropriate to age and level of education     Suicide Risk Assessment:  Protective factors: age, gender, no prior attempts, no prior hospitalizations, no ongoing substance abuse, no psychosis, , has children, denies SI/intent/plan, seeking treatment, access to treatment, future oriented, good primary support, no access to firearms  Risks: ongoing symptoms of attention and concentration deficit  Patient is a low immediate and long-term risk considering risk factors. Patient denied suicidal or homicidal ideation, plan, or intent.  Patient noted agreement to call 911 and/or present to the nearest emergency department if Pt develops suicidal or  homicidal ideation, plan, or intent.      Assessment and Diagnosis   Status/Progress: Based on the examination today, the patient's problem(s) is/are improved.  New problems have not been presented today.   Co-morbidities are not complicating management of the primary condition.  There are no active rule-out diagnoses for this patient at this time.     General Impression: Pt is a 51 y.o. female with past history of ADHD who presents for follow up treatment.  She notes improvement in her symptoms of attention and concentration deficit and reports the current dose has improved her symptoms with a minimum of side effects. Discussed risks of Adderall XR including but not limited to risk of abuse potential, insomnia, anxiety, elevated BP, HR, arrthymias, MI, stroke, & sudden death.  Through shared decision making with patient Adderall XR will be continued at 20 mg p.o. q.a.m.    This patient's profile was checked on the [place] Prescription Monitoring Program. No aberrant patterns or evidence of misuse.    Safe for outpatient follow up and no acute safety concerns.    Encounter Diagnosis   Name Primary?    ADHD (attention deficit hyperactivity disorder), inattentive type Yes         Intervention/Counseling/Treatment Plan   · Medication Management: The risks and benefits of medication were discussed with the patient. Shared decision making occurred   · The treatment plan and follow up plan were reviewed with the patient.   · Continue Adderall XR 20 mg p.o. q.a.m. for symptoms of attention and concentration deficit  · Offered referral for individual psychotherapy.  · Counseled on regular exercise, maintenance of a healthy weight, balanced diet rich in fruits/vegetables and lean protein, and avoidance of unhealthy habits like smoking and excessive alcohol intake.  · Call to report any worsening of symptoms or problems with the medication. Pt instructed to go to ER with thoughts of harming self, others  · Labs: no new  orders    ADHD (attention deficit hyperactivity disorder), inattentive type  -     dextroamphetamine-amphetamine (ADDERALL XR) 20 MG 24 hr capsule; Take 1 capsule (20 mg total) by mouth every morning.  Dispense: 90 capsule; Refill: 0      Psychotherapy:    Target symptoms: distractability, lack of focus   Outcome monitoring methods: self-report, observation, feedback from family   Therapeutic Intervention Type: supportive psychotherapy   Why chosen therapy is appropriate versus another modality: relevant to diagnosis, patient responds to this modality, evidence based practice   Patient's response to intervention:The patient's response to intervention is accepting.   Progress toward goals: The patient's progress toward goals is good.   Topics discussed/themes: building skills sets for symptom management, symptom recognition, nutrition, exercise   Duration of intervention: 16 minutes    Return to Clinic: 3 months, as needed    -Pt given contact number for psychotherapists at [facility] and also instructed they may check with their health insurance provider for a list of providers  -Spent 30 minutes face to face with the Pt; >50% time spent in counseling   -Questions were sought and answered to the Pt's stated verbal satisfaction.  -Supportive therapy and psychoeducation provided.  -Risks, benefits, and side effects of medications discussed with the Pt who expresses understanding and chooses to take medications as prescribed.   -Pt instructed to call clinic, 911, or go to nearest emergency room if symptoms worsen or pt is in crisis. The Pt expresses understanding.    DISCLAIMER: This note was prepared with [employer] Direct voice recognition transcription software. Garbled syntax, mangled pronouns, and other bizarre constructions may be attributed to that software system     VLADIMIR Bradford, PMHNP-BC  Department of Psychiatry - [facility]  [address]  [address]  [place] [address]  Office: [phone]  Fax: [phone]

## 2022-05-27 ENCOUNTER — PATIENT MESSAGE (OUTPATIENT)
Dept: FAMILY MEDICINE | Facility: CLINIC | Age: 52
End: 2022-05-27
Payer: COMMERCIAL

## 2022-06-10 ENCOUNTER — CLINICAL SUPPORT (OUTPATIENT)
Dept: FAMILY MEDICINE | Facility: CLINIC | Age: 52
End: 2022-06-10
Payer: COMMERCIAL

## 2022-06-10 ENCOUNTER — PATIENT MESSAGE (OUTPATIENT)
Dept: FAMILY MEDICINE | Facility: CLINIC | Age: 52
End: 2022-06-10

## 2022-06-10 ENCOUNTER — TELEPHONE (OUTPATIENT)
Dept: FAMILY MEDICINE | Facility: CLINIC | Age: 52
End: 2022-06-10

## 2022-06-10 ENCOUNTER — TELEPHONE (OUTPATIENT)
Dept: FAMILY MEDICINE | Facility: CLINIC | Age: 52
End: 2022-06-10
Payer: COMMERCIAL

## 2022-06-10 ENCOUNTER — E-VISIT (OUTPATIENT)
Dept: FAMILY MEDICINE | Facility: CLINIC | Age: 52
End: 2022-06-10
Payer: COMMERCIAL

## 2022-06-10 DIAGNOSIS — Z11.52 ENCOUNTER FOR SCREENING FOR COVID-19: Primary | ICD-10-CM

## 2022-06-10 DIAGNOSIS — Z11.52 ENCOUNTER FOR SCREENING FOR COVID-19: ICD-10-CM

## 2022-06-10 DIAGNOSIS — J06.9 UPPER RESPIRATORY TRACT INFECTION, UNSPECIFIED TYPE: Primary | ICD-10-CM

## 2022-06-10 LAB
CTP QC/QA: YES
SARS-COV-2 RDRP RESP QL NAA+PROBE: NEGATIVE

## 2022-06-10 PROCEDURE — U0002: ICD-10-PCS | Mod: QW,S$GLB,, | Performed by: INTERNAL MEDICINE

## 2022-06-10 PROCEDURE — 99999 PR PBB SHADOW E&M-EST. PATIENT-LVL I: CPT | Mod: PBBFAC,,,

## 2022-06-10 PROCEDURE — 99421 OL DIG E/M SVC 5-10 MIN: CPT | Mod: ,,, | Performed by: INTERNAL MEDICINE

## 2022-06-10 PROCEDURE — 99999 PR PBB SHADOW E&M-EST. PATIENT-LVL I: ICD-10-PCS | Mod: PBBFAC,,,

## 2022-06-10 PROCEDURE — 99421 PR E&M, ONLINE DIGIT, EST, < 7 DAYS, 5-10 MINS: ICD-10-PCS | Mod: ,,, | Performed by: INTERNAL MEDICINE

## 2022-06-10 PROCEDURE — U0002 COVID-19 LAB TEST NON-CDC: HCPCS | Mod: QW,S$GLB,, | Performed by: INTERNAL MEDICINE

## 2022-06-10 NOTE — TELEPHONE ENCOUNTER
----- Message from Dorinda Rashid sent at 6/10/2022  7:31 AM CDT -----  Contact: Anna Marie Thrasher would like a call back at [phone], in regards to taking a covid test and setting an appointment for today. She took two home test and both came back negative.  Thank you

## 2022-06-10 NOTE — TELEPHONE ENCOUNTER
Patient has been exposed to covid and is feeling bad. 2 home covid tests were negative but patient is requesting coming here to get tested because she is scared they are false. Patient also going to complete E-visit.

## 2022-06-10 NOTE — TELEPHONE ENCOUNTER
----- Message from Dacia King MD sent at 6/10/2022 10:14 AM CDT -----  I also ordered a flu for her to do with the COVID if she hasnt came already to clinic

## 2022-06-10 NOTE — PROGRESS NOTES
Patient ID: Anna Marie Lindo is a 51 y.o. female.    Chief Complaint: Cough    The patient initiated a request through [employer] on 6/10/2022 for evaluation and management with a chief complaint of Cough     I evaluated the questionnaire submission on 06/10/2022 .    Ohs Peq Evisit Upper Respitatory/Cough Questionnaire    6/10/2022 10:03 AM CDT - Filed by Patient   Do you agree to participate in an E-Visit? Yes   What is the main issue that you would like for your doctor to address today? No smell no taste was around COVID + people cough fatigue   Are you able to take your vital signs? No   What symptoms do you currently have?  Cough;  Fatigue;  Nasal Congestion;  New loss of taste or smell;  Sore throat   Have you had a fever? No   When did your symptoms first appear? 6/8/2022   In the last two weeks, have you been in close contact with someone who has COVID-19? Yes   In the last two weeks, have you worked or volunteered in a healthcare facility or as a ? Healthcare facilities include a hospital, medical or dental clinic, long-term care facility, or nursing home No   Do you live in a long-term care facility, nursing home, or homeless shelter? No   List what you have done or taken to help your symptoms. Otc medicine   How severe are your symptoms? Moderate   Have you taken an at home Covid test? Yes   What were the results? Negative   Have you been fully vaccinated for COVID? (2 Pfizer, 2 Moderna or 1 Tye & Tye vaccine injections) Yes   Have you received a booster? No   Do you have transportation to get tested for COVID if it is indicated and ordered for you at an [facility] location? Yes   Provide any information you feel is important to your history not asked above    Please attach any relevant images or files         Active Problem List with Overview Notes    Diagnosis Date Noted    ADHD (attention deficit hyperactivity disorder), inattentive type 02/23/2022    Gastroesophageal reflux disease  without esophagitis 02/09/2022     -symptoms controlled with daily PPI  -denies alarm symptoms, such as dysphagia, weight loss or N/V  -continue lifestyle modification with avoidance of acidic foods, caffeine, late night eating        Recent Labs Obtained:  No visits with results within 7 Day(s) from this visit.   Latest known visit with results is:   Lab Visit on 03/30/2022   Component Date Value Ref Range Status    Sodium 03/30/2022 140  136 - 145 mmol/L Final    Potassium 03/30/2022 3.5  3.5 - 5.1 mmol/L Final    Chloride 03/30/2022 100  95 - 110 mmol/L Final    CO2 03/30/2022 27  23 - 29 mmol/L Final    Glucose 03/30/2022 81  70 - 110 mg/dL Final    BUN 03/30/2022 6  6 - 20 mg/dL Final    Creatinine 03/30/2022 0.7  0.5 - 1.4 mg/dL Final    Calcium 03/30/2022 9.7  8.7 - 10.5 mg/dL Final    Anion Gap 03/30/2022 13  8 - 16 mmol/L Final    eGFR if African American 03/30/2022 >60.0  >60 mL/min/1.73 m^2 Final    eGFR if non African American 03/30/2022 >60.0  >60 mL/min/1.73 m^2 Final    Comment: Calculation used to obtain the estimated glomerular filtration  rate (eGFR) is the CKD-EPI equation.          Encounter Diagnosis   Name Primary?    Upper respiratory tract infection, unspecified type Yes        Orders Placed This Encounter   Procedures    POCT Influenza A/B     Standing Status:   Future     Standing Expiration Date:   8/9/2023   COVID test also ordered today but out of this encounter.         E-Visit Time Tracking:    Day 1 Time (in minutes): 5     Total Time (in minutes): 5

## 2022-06-10 NOTE — TELEPHONE ENCOUNTER
I have signed for the following orders AND/OR meds.  Please call the patient and ask the patient to schedule the testing AND/OR inform about any medications that were sent. Medications have been sent to pharmacy listed below      Orders Placed This Encounter   Procedures    POCT COVID-19 Rapid Screening     Standing Status:   Future     Standing Expiration Date:   8/9/2023     Order Specific Question:   Is the patient symptomatic?     Answer:   Yes              [id number] - [place] - [address]  [place] [address]  Phone: [phone] Fax: [phone]

## 2022-07-26 ENCOUNTER — PATIENT MESSAGE (OUTPATIENT)
Dept: PSYCHIATRY | Facility: CLINIC | Age: 52
End: 2022-07-26
Payer: COMMERCIAL

## 2022-07-27 ENCOUNTER — PATIENT MESSAGE (OUTPATIENT)
Dept: PSYCHIATRY | Facility: CLINIC | Age: 52
End: 2022-07-27
Payer: COMMERCIAL

## 2022-07-27 ENCOUNTER — OFFICE VISIT (OUTPATIENT)
Dept: PSYCHIATRY | Facility: CLINIC | Age: 52
End: 2022-07-27
Payer: COMMERCIAL

## 2022-07-27 DIAGNOSIS — F90.0 ADHD (ATTENTION DEFICIT HYPERACTIVITY DISORDER), INATTENTIVE TYPE: ICD-10-CM

## 2022-07-27 PROCEDURE — 1159F PR MEDICATION LIST DOCUMENTED IN MEDICAL RECORD: ICD-10-PCS | Mod: CPTII,95,,

## 2022-07-27 PROCEDURE — 99214 OFFICE O/P EST MOD 30 MIN: CPT | Mod: 95,,,

## 2022-07-27 PROCEDURE — 1160F RVW MEDS BY RX/DR IN RCRD: CPT | Mod: CPTII,95,,

## 2022-07-27 PROCEDURE — 99214 PR OFFICE/OUTPT VISIT, EST, LEVL IV, 30-39 MIN: ICD-10-PCS | Mod: 95,,,

## 2022-07-27 PROCEDURE — 1159F MED LIST DOCD IN RCRD: CPT | Mod: CPTII,95,,

## 2022-07-27 PROCEDURE — 1160F PR REVIEW ALL MEDS BY PRESCRIBER/CLIN PHARMACIST DOCUMENTED: ICD-10-PCS | Mod: CPTII,95,,

## 2022-07-27 RX ORDER — DEXTROAMPHETAMINE SACCHARATE, AMPHETAMINE ASPARTATE MONOHYDRATE, DEXTROAMPHETAMINE SULFATE AND AMPHETAMINE SULFATE 6.25; 6.25; 6.25; 6.25 MG/1; MG/1; MG/1; MG/1
25 CAPSULE, EXTENDED RELEASE ORAL EVERY MORNING
Qty: 30 CAPSULE | Refills: 0 | Status: SHIPPED | OUTPATIENT
Start: 2022-07-27 | End: 2022-08-31 | Stop reason: SDUPTHER

## 2022-07-27 NOTE — PROGRESS NOTES
Outpatient Psychiatry Follow-Up Visit (MD/NP)    7/27/2022    Clinical Status of Patient:  Outpatient (Virtual)  The patient location is: [id number] [place] [id number]  The patient location [place] is: [place]  The patient phone number is: [phone]   Visit type: Virtual visit with synchronous audio and video  Each patient to whom he or she provides medical services by telemedicine is:  (1) informed of the relationship between the practitioner and patient and the respective role of any other health care provider with respect to management of the patient; and (2) notified that he or she may decline to receive medical services by telemedicine and may withdraw from such care at any time.    Chief Complaint:  Anna Marie Lindo is a 51 y.o. female who presents today for follow-up of attention problems.  Met with patient.      Interval History and Content of Current Session:  Interim Events/Subjective Report/Content of Current Session:     Pt is a 51 y.o. female with past history of ADHD who presents for follow up treatment. Pt established care with me on 2/23/22, where Pt met criteria for ADHD. Pt is currently taking Adderall XR 20 mg po q.a.m..  Pt reports past trials of alprazolam.  Medications tolerated well and provide good relief of symptoms overall.     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 She continues to note xerostomia, but increases her fluid intake when this occurs. Pt denies cardiovascular events including increased heart rate or increased blood pressure, palpitations, chest pain, dizziness, lightheadedness, or syncopal episodes. Pt denies A/V hallucinations or behavioral effects. Pt denies anorexia, decreased appetite, headache, insomnia, or digital changes.      4/27/22: Today, Pt notes improvement in symptoms of attention and concentration deficit.  She is  "able to focus throughout the day.  Patient states I feel like this is the right dose for me. She notes her appetite is at baseline and states xerostomia has improved.  She continues with increased water intake.  She denies vision problems in the interim.  She agrees to notify this provider should she experience vision problems again in the future.  She denies insomnia and states she feels her sleep has improved since starting Adderall. Pt denies cardiovascular events including increased heart rate or increased blood pressure, palpitations, chest pain, dizziness, lightheadedness, or syncopal episodes. Pt denies A/V hallucinations or behavioral effects. Pt denies anorexia, headache, insomnia, or digital changes.    Initial HPI: Pt. is a 51 y.o. female, with no past psychiatric hx presenting to the clinic for an initial evaluation and treatment. PMHx outlined below. Pt is currently taking no psychotropic medications. Pt notes past trials of alprazolam.        Pt presents today with symptoms of lifelong inattention and concentration deficit causing impairment in her family life as well as her career. She notes her 3 daughters have all been diagnosed and treated for ADHD, however the Pt has never sought treatment.  She notes she is easily distracted and frequently becomes sidetracked both at work and at home. She states multiple coworkers have suggested she be tested for ADHD. Pt reports as a child in school she had to study more than her peers and notes she had to repeat material many times, writing it, stating it out loud, and then repeating it again and again. She notes "When people speak to me it takes a minute to process." She also reports, "Sometimes I can't get my mouth to go as quickly as my thoughts are going." See ADHD screening below.     Pt describes mood as "Good." She notes a history of depression in her family and states she engages in physical activity when she begins to have feelings of sadness or " "depression. Pt reports she sleeps on average ~7 hours per night and denies difficulty initiating or maintaining sleep.  Pt does report some anxiety, but states this has never interfered with her life. She also notes some mild social anxiety.     ADHD Screening:  Trouble paying close attention to details, or careless mistakes: yes, "if I make mistakes its because I didn't pay attn to details"  Difficulty sustaining attention/remaining focused: frequently  Absent minded/wandeing thoughts during conversation: yes  Doesn't follow through on instructions, starts tasks but does not finish or easily distracted: frequently d/t distraction  Difficulty with organizing: occasionally   Avoids/dislikes tasks that require sustained attention: frequently  Looses important things:  Has developed routines and places for important items to go so they don't get lost  Easily distracted by extraneous stimuli: frequently  Forgetful in daily activities: frequently d/t distraction  ------  Often fidgets/squirms: occasionally when anxious  Often leaves seat at inappropriate times: no  Runs around, climbing on things or feeling restless: no  Unable to engage in leisure activities: no  Often "on the go" , motor driven: no  Often talks excessively: yes, mario alberto when nervous  Blurts out, interrupts: frequently  Can't wait turn: no  Often interrupts/intrudes: frequently    Initial Psych ROS:  Depression: denies depressed mood, anhedonia, appetite/weight changes, insomnia/hypersomnia, fatigue, feelings of worthlessness, hopelessness, or guilt, difficulty concentrating, or recurrent thoughts of death or SI  Erna: denies episodes of expansive mood, decreased need for sleep, increased goal directed behaviors, or racing thoughts  Anxiety: denies panic attacks, agoraphobia, social anxiety, phobias, excessive worry, avoidance, or somatic related complaints   OCD: denies obsessions or compulsive behaviors  PTSD: denies flashbacks, nightmares, or avoidance " "of stimuli  Psychosis: denies A/V hallucinations or delusions   SI/HI: denies suicidal ideation, plan, or thoughts of harm to self or others "my animals need me"  Access to guns: yes     Past Psychiatric History:  First psych contact:  Today, 2/23/22  Prior hospitalizations: denies  Prior suicide attempts or self-harm: denies  Prior diagnosis: denies  Prior meds: xanax   Current meds: none  Prior psychotherapy: denies    Past Medical hx:   Past Medical History:   Diagnosis Date    GERD (gastroesophageal reflux disease)     Hypokalemia       Hx of TBI: denies  Hx of seizures: denies    Past Surgical hx:   Past Surgical History:   Procedure Laterality Date    ABDOMINOPLASTY      BILATERAL TUBAL LIGATION      COLONOSCOPY N/A 4/26/2022    Procedure: COLONOSCOPY;  Surgeon: Ubaldo Gutierrez MD;  Location: [facility];  Service: Endoscopy;  Laterality: N/A;    COSMETIC SURGERY  August 2016    Tummy tuck    TOTAL ABDOMINAL HYSTERECTOMY      TUBAL LIGATION  June 1997       Family Hx:   Paternal: grandmother alcoholic; no psychiatric history or history of substance abuse or suicide  Maternal: mom undiagnosed psych estranged from kids; no history of substance abuse or suicide     Social Hx:   Childhood: born and raised in [place]  Marital Status:   Children: 3 daughters, all ADHD  Resides: [place]  Occupation:    Hobbies: karate  [employer]: [employer]  Education level: high school graduate  : denies  Legal: denies     Substance Hx:  Caffeine: diet coke, 6 per day  Tobacco: denies  Alcohol: occasional ~2-3 drinks at a time  Drug use: Denied current use.  Denied history of abuse or dependency.  Rehab: denies  Prior/current AA: denies      Interim hx:     Medication changes last visit:  4/27/22:  Continue Adderall XR 20 mg p.o. q.a.m. for symptoms of attention and concentration deficit    3/23/2022  · Increase Adderall XR to 20 mg p.o. q.a.m. for symptoms of attention and " concentration deficit    Medication changes on 2/23/2022:  · Started Adderall XR 10 mg po q.a.m.     Anxiety: denies  Depression: denies     Alcohol/Drugs/Caffeine/Tobacco: No change in consumption      Review of Systems   · PSYCHIATRIC: Pertinant items are noted in the narrative.  · All other systems reviewed and found to be negative       Past Medical, Family and Social History: The patient's past medical, family and social history have been reviewed and updated as appropriate within the electronic medical record - see encounter notes.    Adherence: yes    Side effects: see above    Risk Parameters:  Patient reports no suicidal ideation  Patient reports no homicidal ideation  Patient reports no self-injurious behavior  Patient reports no violent behavior    Exam   Constitutional  Vitals:  Most recent vital signs, dated greater than 90 days prior to this appointment, were reviewed.   Last 3 sets of Vitals    Vitals - 1 value per visit 4/26/2022 4/26/2022 4/27/2022   SYSTOLIC 114 102 -   DIASTOLIC 71 65 -   Pulse 62 67 -   Temp - - -   Resp 15 15 -   SPO2 98 - -   Weight (lb) - - -   Weight (kg) - - -   Height - - -   BMI (Calculated) - - -   VISIT REPORT - - -   Pain Score  - - 0          General:  unremarkable, age appropriate     Musculoskeletal  Muscle Strength/Tone:  Unable to assess d/t nature of virtual visit   Gait & Station:  Unable to assess d/t nature of virtual visit     Psychiatric  Speech:  no latency; no press   Mood & Affect:  euthymic  congruent and appropriate   Thought Process:  normal and logical   Associations:  intact   Thought Content:  normal, no suicidality, no homicidality, delusions, or paranoia   Insight:  intact   Judgement: behavior is adequate to circumstances   Orientation:  grossly intact   Memory: intact for content of interview   Language: grossly intact   Attention Span & Concentration:  able to focus   Fund of Knowledge:  intact and appropriate to age and level of education      Suicide Risk Assessment:  Protective factors: age, gender, no prior attempts, no prior hospitalizations, no ongoing substance abuse, no psychosis, , has children, denies SI/intent/plan, seeking treatment, access to treatment, future oriented, good primary support, no access to firearms  Risks: ongoing symptoms of attention and concentration deficit  Patient is a low immediate and long-term risk considering risk factors. Patient denied suicidal or homicidal ideation, plan, or intent.  Patient noted agreement to call 911 and/or present to the nearest emergency department if Pt develops suicidal or homicidal ideation, plan, or intent.      Assessment and Diagnosis   Status/Progress: Based on the examination today, the patient's problem(s) is/are adequately but not ideally controlled.  New problems have not been presented today.   Co-morbidities are not complicating management of the primary condition.  There are no active rule-out diagnoses for this patient at this time.     General Impression: Pt is a 51 y.o. female with past history of ADHD who presents for follow up treatment.  Today, patient reports frequent breakthrough symptoms.  She also notes fatigue and a crash in the evenings while driving home from work.  We discussed risks, benefits, and side effects of increasing Adderall XR dose and patient would like to proceed with an increase.  I believe increasing the dose of Adderall is prudent given the increased risk of MVA due to patient's afternoon fatigue.    This patient's profile was checked on the [place] Prescription Monitoring Program. No aberrant patterns or evidence of misuse.    Safe for outpatient follow up and no acute safety concerns.    Encounter Diagnosis   Name Primary?    ADHD (attention deficit hyperactivity disorder), inattentive type          Intervention/Counseling/Treatment Plan   · Medication Management: The risks and benefits of medication were discussed with the patient.  Shared decision making occurred   · The treatment plan and follow up plan were reviewed with the patient.   · Continue Adderall XR 20 mg p.o. q.a.m. for symptoms of attention and concentration deficit  · Offered referral for individual psychotherapy.  · Counseled on regular exercise, maintenance of a healthy weight, balanced diet rich in fruits/vegetables and lean protein, and avoidance of unhealthy habits like smoking and excessive alcohol intake.  · Call to report any worsening of symptoms or problems with the medication. Pt instructed to go to ER with thoughts of harming self, others  · Labs: no new orders    ADHD (attention deficit hyperactivity disorder), inattentive type  -     dextroamphetamine-amphetamine (ADDERALL XR) 25 MG 24 hr capsule; Take 1 capsule (25 mg total) by mouth every morning.  Dispense: 30 capsule; Refill: 0      Psychotherapy:    Target symptoms: distractability, lack of focus   Outcome monitoring methods: self-report, observation, feedback from family   Therapeutic Intervention Type: supportive psychotherapy   Why chosen therapy is appropriate versus another modality: relevant to diagnosis, patient responds to this modality, evidence based practice   Patient's response to intervention:The patient's response to intervention is accepting.   Progress toward goals: The patient's progress toward goals is good.   Topics discussed/themes: building skills sets for symptom management, symptom recognition, nutrition, exercise   Duration of intervention: 5 minutes    Return to Clinic: 1 month      Medication Management: The risks and benefits of stimulant medication were discussed.      Patient has no contraindications: no h/o allergic rxn, agitation, arrythmia, cardiovascular disease, cardiac structural abnormalities, hyperthyroidism, glaucoma, etc.    Completed cardiac screen prior to initiation.    Discussed stimulant side effects including effects on sleep, appetite, cardiac concerns,  chest pain, psychosis, wilber, aggression, HTN, MI, stroke, arrythmia, seizure, anaphylaxis or other allergic reactions, leukopenia, nervousness, anorexia, insomnia, tachycardia, palpitations, dizziness, BP changes, HR changes, visual disturbance, and headaches   Discussed medication being a controlled substance, to place medication in a secured place, and the risk of dependency. Discussed to never use this medication in combination with illicit drugs, alcohol, or sedatives.    Patient to sign consent for treatment with a controlled substance document,.   Discussed diagnosis, risks and benefits of proposed treatment vs alternative treatments vs no treatment, and potential side effects of these treatments (death, dependency, etc.). The patient expresses understanding of the above and displays the capacity to agree with this treatment given said understanding. Patient also agrees that, currently, the benefits outweigh the risks and would like to pursue treatment at this time.    -Educated patient about appropriate treatment options incl. stimulant medication, nonstimulant medication, and therapy.   -Educated patient about appropriate use of ADHD medications, common side effects of the medications (cardiac being most significant) and when to call about complications.   -Take any c/o chest pain seriously & seek immediate medical attention.      -Pt given contact number for psychotherapists at [facility] and also instructed they may check with their health insurance provider for a list of providers  -Spent 30 minutes face to face with the Pt; >50% time spent in counseling   -Questions were sought and answered to the Pt's stated verbal satisfaction.  -Supportive therapy and psychoeducation provided.  -Risks, benefits, and side effects of medications discussed with the Pt who expresses understanding and chooses to take medications as prescribed.   -Pt instructed to call clinic, 911, or go to nearest emergency room  if symptoms worsen or pt is in crisis. The Pt expresses understanding.    DISCLAIMER: This note was prepared with [employer] Direct voice recognition transcription software. Garbled syntax, mangled pronouns, and other bizarre constructions may be attributed to that software system     VLADIMIR Bradford, PMHNP-BC  Department of Psychiatry - [facility] [address]  [place] [address]  Office: [phone]  Fax: [phone]

## 2022-08-22 ENCOUNTER — PATIENT MESSAGE (OUTPATIENT)
Dept: PSYCHIATRY | Facility: CLINIC | Age: 52
End: 2022-08-22
Payer: COMMERCIAL

## 2022-08-22 ENCOUNTER — PATIENT MESSAGE (OUTPATIENT)
Dept: FAMILY MEDICINE | Facility: CLINIC | Age: 52
End: 2022-08-22
Payer: COMMERCIAL

## 2022-08-30 ENCOUNTER — PATIENT MESSAGE (OUTPATIENT)
Dept: PSYCHIATRY | Facility: CLINIC | Age: 52
End: 2022-08-30
Payer: COMMERCIAL

## 2022-08-31 ENCOUNTER — OFFICE VISIT (OUTPATIENT)
Dept: PSYCHIATRY | Facility: CLINIC | Age: 52
End: 2022-08-31
Payer: COMMERCIAL

## 2022-08-31 DIAGNOSIS — F90.0 ADHD (ATTENTION DEFICIT HYPERACTIVITY DISORDER), INATTENTIVE TYPE: ICD-10-CM

## 2022-08-31 PROCEDURE — 99213 OFFICE O/P EST LOW 20 MIN: CPT | Mod: 95,,,

## 2022-08-31 PROCEDURE — 99213 PR OFFICE/OUTPT VISIT, EST, LEVL III, 20-29 MIN: ICD-10-PCS | Mod: 95,,,

## 2022-08-31 PROCEDURE — 1159F MED LIST DOCD IN RCRD: CPT | Mod: CPTII,95,,

## 2022-08-31 PROCEDURE — 1160F PR REVIEW ALL MEDS BY PRESCRIBER/CLIN PHARMACIST DOCUMENTED: ICD-10-PCS | Mod: CPTII,95,,

## 2022-08-31 PROCEDURE — 1160F RVW MEDS BY RX/DR IN RCRD: CPT | Mod: CPTII,95,,

## 2022-08-31 PROCEDURE — 1159F PR MEDICATION LIST DOCUMENTED IN MEDICAL RECORD: ICD-10-PCS | Mod: CPTII,95,,

## 2022-08-31 RX ORDER — DEXTROAMPHETAMINE SACCHARATE, AMPHETAMINE ASPARTATE MONOHYDRATE, DEXTROAMPHETAMINE SULFATE AND AMPHETAMINE SULFATE 6.25; 6.25; 6.25; 6.25 MG/1; MG/1; MG/1; MG/1
25 CAPSULE, EXTENDED RELEASE ORAL EVERY MORNING
Qty: 30 CAPSULE | Refills: 0 | Status: SHIPPED | OUTPATIENT
Start: 2022-09-30 | End: 2022-10-30

## 2022-08-31 RX ORDER — DEXTROAMPHETAMINE SACCHARATE, AMPHETAMINE ASPARTATE MONOHYDRATE, DEXTROAMPHETAMINE SULFATE AND AMPHETAMINE SULFATE 6.25; 6.25; 6.25; 6.25 MG/1; MG/1; MG/1; MG/1
25 CAPSULE, EXTENDED RELEASE ORAL EVERY MORNING
Qty: 30 CAPSULE | Refills: 0 | Status: SHIPPED | OUTPATIENT
Start: 2022-08-31 | End: 2022-09-30

## 2022-08-31 RX ORDER — DEXTROAMPHETAMINE SACCHARATE, AMPHETAMINE ASPARTATE MONOHYDRATE, DEXTROAMPHETAMINE SULFATE AND AMPHETAMINE SULFATE 6.25; 6.25; 6.25; 6.25 MG/1; MG/1; MG/1; MG/1
25 CAPSULE, EXTENDED RELEASE ORAL EVERY MORNING
Qty: 30 CAPSULE | Refills: 0 | Status: SHIPPED | OUTPATIENT
Start: 2022-10-31 | End: 2022-11-02 | Stop reason: SDUPTHER

## 2022-08-31 NOTE — PROGRESS NOTES
Outpatient Psychiatry Follow-Up Visit (MD/NP)    8/31/2022    Clinical Status of Patient:  Outpatient (Virtual)  The patient location is: [facility] [place] [address]  The patient location [place] is: [place]  The patient phone number is: [phone]   Visit type: Virtual visit with synchronous audio and video  Each patient to whom he or she provides medical services by telemedicine is:  (1) informed of the relationship between the practitioner and patient and the respective role of any other health care provider with respect to management of the patient; and (2) notified that he or she may decline to receive medical services by telemedicine and may withdraw from such care at any time.    Chief Complaint:  Anna Marie Lindo is a 51 y.o. female who presents today for follow-up of attention problems.  Met with patient.      Interval History and Content of Current Session:  Interim Events/Subjective Report/Content of Current Session:     Pt is a 51 y.o. female with past history of ADHD who presents for follow up treatment. Pt established care with me on 2/23/22, where Pt met criteria for ADHD. Pt is currently taking Adderall XR 25 mg po q.a.m..  Pt reports past trials of alprazolam.  Medications tolerated well and provide good relief of symptoms overall.     Pt reports afternoon breakthrough symptoms and fatigue have resolved after increase in dose of Adderall XR from 20-25 mg p.o. q.a.m. 1 month ago.  She is no longer experiencing fatigue during her commute home.  She denies insomnia and notes she is able to easily fall asleep.    Pt denies cardiovascular events including increased heart rate or increased blood pressure, palpitations, chest pain, dizziness, lightheadedness, or syncopal episodes. Pt denies A/V hallucinations or behavioral effects. Pt denies anorexia, decreased appetite, xerostomia, headache, insomnia, or digital changes.    7/27/22: Today patient reports frequent symptom break-through as  well as significant fatigue in the afternoon while driving home from work and requests to increase Adderall dose. She continues to note xerostomia, but increases her fluid intake when this occurs. Pt denies cardiovascular events including increased heart rate or increased blood pressure, palpitations, chest pain, dizziness, lightheadedness, or syncopal episodes. Pt denies A/V hallucinations or behavioral effects. Pt denies anorexia, decreased appetite, headache, insomnia, or digital changes.    4/27/22: Today, Pt notes improvement in symptoms of attention and concentration deficit.  She is able to focus throughout the day.  Patient states I feel like this is the right dose for me. She notes her appetite is at baseline and states xerostomia has improved.  She continues with increased water intake.  She denies vision problems in the interim.  She agrees to notify this provider should she experience vision problems again in the future.  She denies insomnia and states she feels her sleep has improved since starting Adderall. Pt denies cardiovascular events including increased heart rate or increased blood pressure, palpitations, chest pain, dizziness, lightheadedness, or syncopal episodes. Pt denies A/V hallucinations or behavioral effects. Pt denies anorexia, headache, insomnia, or digital changes.    Initial HPI: Pt. is a 51 y.o. female, with no past psychiatric hx presenting to the clinic for an initial evaluation and treatment. PMHx outlined below. Pt is currently taking no psychotropic medications. Pt notes past trials of alprazolam.        Pt presents today with symptoms of lifelong inattention and concentration deficit causing impairment in her family life as well as her career. She notes her 3 daughters have all been diagnosed and treated for ADHD, however the Pt has never sought treatment.  She notes she is easily distracted and frequently becomes sidetracked both at work and at home. She states multiple  "coworkers have suggested she be tested for ADHD. Pt reports as a child in school she had to study more than her peers and notes she had to repeat material many times, writing it, stating it out loud, and then repeating it again and again. She notes "When people speak to me it takes a minute to process." She also reports, "Sometimes I can't get my mouth to go as quickly as my thoughts are going." See ADHD screening below.     Pt describes mood as "Good." She notes a history of depression in her family and states she engages in physical activity when she begins to have feelings of sadness or depression. Pt reports she sleeps on average ~7 hours per night and denies difficulty initiating or maintaining sleep.  Pt does report some anxiety, but states this has never interfered with her life. She also notes some mild social anxiety.     ADHD Screening:  Trouble paying close attention to details, or careless mistakes: yes, "if I make mistakes its because I didn't pay attn to details"  Difficulty sustaining attention/remaining focused: frequently  Absent minded/wandeing thoughts during conversation: yes  Doesn't follow through on instructions, starts tasks but does not finish or easily distracted: frequently d/t distraction  Difficulty with organizing: occasionally   Avoids/dislikes tasks that require sustained attention: frequently  Looses important things:  Has developed routines and places for important items to go so they don't get lost  Easily distracted by extraneous stimuli: frequently  Forgetful in daily activities: frequently d/t distraction  ------  Often fidgets/squirms: occasionally when anxious  Often leaves seat at inappropriate times: no  Runs around, climbing on things or feeling restless: no  Unable to engage in leisure activities: no  Often "on the go" , motor driven: no  Often talks excessively: yes, mario alberto when nervous  Blurts out, interrupts: frequently  Can't wait turn: no  Often interrupts/intrudes: " "frequently    Initial Psych ROS:  Depression: denies depressed mood, anhedonia, appetite/weight changes, insomnia/hypersomnia, fatigue, feelings of worthlessness, hopelessness, or guilt, difficulty concentrating, or recurrent thoughts of death or SI  Erna: denies episodes of expansive mood, decreased need for sleep, increased goal directed behaviors, or racing thoughts  Anxiety: denies panic attacks, agoraphobia, social anxiety, phobias, excessive worry, avoidance, or somatic related complaints   OCD: denies obsessions or compulsive behaviors  PTSD: denies flashbacks, nightmares, or avoidance of stimuli  Psychosis: denies A/V hallucinations or delusions   SI/HI: denies suicidal ideation, plan, or thoughts of harm to self or others "my animals need me"  Access to guns: yes     Past Psychiatric History:  First psych contact:  Today, 2/23/22  Prior hospitalizations: denies  Prior suicide attempts or self-harm: denies  Prior diagnosis: denies  Prior meds: xanax   Current meds: none  Prior psychotherapy: denies    Past Medical hx:   Past Medical History:   Diagnosis Date    GERD (gastroesophageal reflux disease)     Hypokalemia       Hx of TBI: denies  Hx of seizures: denies    Past Surgical hx:   Past Surgical History:   Procedure Laterality Date    ABDOMINOPLASTY      BILATERAL TUBAL LIGATION      COLONOSCOPY N/A 4/26/2022    Procedure: COLONOSCOPY;  Surgeon: Ubaldo Gutierrez MD;  Location: [facility];  Service: Endoscopy;  Laterality: N/A;    COSMETIC SURGERY  August 2016    Tummy tack    TOTAL ABDOMINAL HYSTERECTOMY      TUBAL LIGATION  June 1997       Family Hx:   Paternal: grandmother alcoholic; no psychiatric history or history of substance abuse or suicide  Maternal: mom undiagnosed psych estranged from kids; no history of substance abuse or suicide     Social Hx:   Childhood: born and raised in [place]  Marital Status:   Children: 3 daughters, all ADHD  Resides: [place]  Occupation: inventory control " supervisor   Hobbihans: denita  Ad[employer]: [employer]  Education level: high school graduate  : denies  Legal: denies     Substance Hx:  Caffeine: diet coke, 6 per day  Tobacco: denies  Alcohol: occasional ~2-3 drinks at a time  Drug use: Denied current use.  Denied history of abuse or dependency.  Rehab: denies  Prior/current AA: denies      Interim hx:     Medication changes last visit:    Increase Adderall XR to 25 mg p.o. q.a.m. for symptoms of attention and concentration deficit    4/27/22:  Continue Adderall XR 20 mg p.o. q.a.m. for symptoms of attention and concentration deficit    3/23/2022  Increase Adderall XR to 20 mg p.o. q.a.m. for symptoms of attention and concentration deficit    Medication changes on 2/23/2022:  Started Adderall XR 10 mg po q.a.m.     Anxiety: denies  Depression: denies     Alcohol/Drugs/Caffeine/Tobacco: No change in consumption      Review of Systems   PSYCHIATRIC: Pertinant items are noted in the narrative.  All other systems reviewed and found to be negative       Past Medical, Family and Social History: The patient's past medical, family and social history have been reviewed and updated as appropriate within the electronic medical record - see encounter notes.    Adherence: yes    Side effects: see above    Risk Parameters:  Patient reports no suicidal ideation  Patient reports no homicidal ideation  Patient reports no self-injurious behavior  Patient reports no violent behavior    Exam   Constitutional  Vitals:  Most recent vital signs, dated greater than 90 days prior to this appointment, were reviewed.   Last 3 sets of Vitals    Vitals - 1 value per visit 4/26/2022 4/26/2022 4/27/2022   SYSTOLIC 114 102 -   DIASTOLIC 71 65 -   Pulse 62 67 -   Temp - - -   Resp 15 15 -   SPO2 98 - -   Weight (lb) - - -   Weight (kg) - - -   Height - - -   BMI (Calculated) - - -   VISIT REPORT - - -   Pain Score  - - 0          General:  unremarkable, age appropriate      Musculoskeletal  Muscle Strength/Tone:  Unable to assess d/t nature of virtual visit   Gait & Station:  Unable to assess d/t nature of virtual visit     Psychiatric  Speech:  no latency; no press   Mood & Affect:  euthymic  congruent and appropriate   Thought Process:  normal and logical   Associations:  intact   Thought Content:  normal, no suicidality, no homicidality, delusions, or paranoia   Insight:  intact   Judgement: behavior is adequate to circumstances   Orientation:  grossly intact   Memory: intact for content of interview   Language: grossly intact   Attention Span & Concentration:  able to focus   Fund of Knowledge:  intact and appropriate to age and level of education     Suicide Risk Assessment:  Protective factors: age, gender, no prior attempts, no prior hospitalizations, no ongoing substance abuse, no psychosis, , has children, denies SI/intent/plan, seeking treatment, access to treatment, future oriented, good primary support, no access to firearms  Risks: ongoing symptoms of attention and concentration deficit  Patient is a low immediate and long-term risk considering risk factors. Patient denied suicidal or homicidal ideation, plan, or intent.  Patient noted agreement to call 911 and/or present to the nearest emergency department if Pt develops suicidal or homicidal ideation, plan, or intent.      Assessment and Diagnosis   Status/Progress: Based on the examination today, the patient's problem(s) is/are adequately but not ideally controlled.  New problems have not been presented today.   Co-morbidities are not complicating management of the primary condition.  There are no active rule-out diagnoses for this patient at this time.     General Impression: Pt is a 51 y.o. female with past history of ADHD who presents for follow up treatment.  Today, patient reports resolution of afternoon fatigue and breakthrough symptoms.  Through shared decision making pharmacological intervention will  be continued at this time.    This patient's profile was checked on the [place] Prescription Monitoring Program. No aberrant patterns or evidence of misuse.    Safe for outpatient follow up and no acute safety concerns.    Encounter Diagnosis   Name Primary?    ADHD (attention deficit hyperactivity disorder), inattentive type          Intervention/Counseling/Treatment Plan   Medication Management: The risks and benefits of medication were discussed with the patient. Shared decision making occurred   The treatment plan and follow up plan were reviewed with the patient.   Continue Adderall XR 25mg p.o. q.a.m. for symptoms of attention and concentration deficit  Offered referral for individual psychotherapy.  Counseled on regular exercise, maintenance of a healthy weight, balanced diet rich in fruits/vegetables and lean protein, and avoidance of unhealthy habits like smoking and excessive alcohol intake.  Call to report any worsening of symptoms or problems with the medication. Pt instructed to go to ER with thoughts of harming self, others  Labs: no new orders    ADHD (attention deficit hyperactivity disorder), inattentive type  -     dextroamphetamine-amphetamine (ADDERALL XR) 25 MG 24 hr capsule; Take 1 capsule (25 mg total) by mouth every morning.  Dispense: 30 capsule; Refill: 0  -     dextroamphetamine-amphetamine (ADDERALL XR) 25 MG 24 hr capsule; Take 1 capsule (25 mg total) by mouth every morning.  Dispense: 30 capsule; Refill: 0  -     dextroamphetamine-amphetamine (ADDERALL XR) 25 MG 24 hr capsule; Take 1 capsule (25 mg total) by mouth every morning.  Dispense: 30 capsule; Refill: 0        Return to Clinic: 3 months or sooner if needed      Medication Management: The risks and benefits of stimulant medication were discussed.     Patient has no contraindications: no h/o allergic rxn, agitation, arrythmia, cardiovascular disease, cardiac structural abnormalities, hyperthyroidism, glaucoma, etc.   Completed  cardiac screen prior to initiation.   Discussed stimulant side effects including effects on sleep, appetite, cardiac concerns, chest pain, psychosis, wilber, aggression, HTN, MI, stroke, arrythmia, seizure, anaphylaxis or other allergic reactions, leukopenia, nervousness, anorexia, insomnia, tachycardia, palpitations, dizziness, BP changes, HR changes, visual disturbance, and headaches  Discussed medication being a controlled substance, to place medication in a secured place, and the risk of dependency. Discussed to never use this medication in combination with illicit drugs, alcohol, or sedatives.   Patient to sign consent for treatment with a controlled substance document,.  Discussed diagnosis, risks and benefits of proposed treatment vs alternative treatments vs no treatment, and potential side effects of these treatments (death, dependency, etc.). The patient expresses understanding of the above and displays the capacity to agree with this treatment given said understanding. Patient also agrees that, currently, the benefits outweigh the risks and would like to pursue treatment at this time.    -Educated patient about appropriate treatment options incl. stimulant medication, nonstimulant medication, and therapy.   -Educated patient about appropriate use of ADHD medications, common side effects of the medications (cardiac being most significant) and when to call about complications.   -Take any c/o chest pain seriously & seek immediate medical attention.      -Pt given contact number for psychotherapists at [facility], [place] and also instructed they may check with their health insurance provider for a list of providers  -Spent 30 minutes face to face with the Pt; >50% time spent in counseling   -Questions were sought and answered to the Pt's stated verbal satisfaction.  -Supportive therapy and psychoeducation provided.  -Risks, benefits, and side effects of medications discussed with the Pt who expresses  understanding and chooses to take medications as prescribed.   -Pt instructed to call clinic, 911, or go to nearest emergency room if symptoms worsen or pt is in crisis. The Pt expresses understanding.    DISCLAIMER: This note was prepared with [employer] Direct voice recognition transcription software. Garbled syntax, mangled pronouns, and other bizarre constructions may be attributed to that software system     VLADIMIR Bradford, PMHNP-BC  Department of Psychiatry - [facility] [address]  [place] [address]  Office: [phone]  Fax: [phone]

## 2022-10-19 ENCOUNTER — OFFICE VISIT (OUTPATIENT)
Dept: FAMILY MEDICINE | Facility: CLINIC | Age: 52
End: 2022-10-19
Payer: COMMERCIAL

## 2022-10-19 VITALS
BODY MASS INDEX: 25.33 KG/M2 | DIASTOLIC BLOOD PRESSURE: 73 MMHG | TEMPERATURE: 98 F | SYSTOLIC BLOOD PRESSURE: 116 MMHG | HEART RATE: 87 BPM | HEIGHT: 64 IN | WEIGHT: 148.38 LBS

## 2022-10-19 DIAGNOSIS — Z00.00 ENCOUNTER FOR ANNUAL HEALTH EXAMINATION: Primary | ICD-10-CM

## 2022-10-19 PROCEDURE — 1159F MED LIST DOCD IN RCRD: CPT | Mod: CPTII,S$GLB,, | Performed by: INTERNAL MEDICINE

## 2022-10-19 PROCEDURE — 3074F PR MOST RECENT SYSTOLIC BLOOD PRESSURE < 130 MM HG: ICD-10-PCS | Mod: CPTII,S$GLB,, | Performed by: INTERNAL MEDICINE

## 2022-10-19 PROCEDURE — 3074F SYST BP LT 130 MM HG: CPT | Mod: CPTII,S$GLB,, | Performed by: INTERNAL MEDICINE

## 2022-10-19 PROCEDURE — 99396 PREV VISIT EST AGE 40-64: CPT | Mod: S$GLB,,, | Performed by: INTERNAL MEDICINE

## 2022-10-19 PROCEDURE — 99999 PR PBB SHADOW E&M-EST. PATIENT-LVL IV: CPT | Mod: PBBFAC,,, | Performed by: INTERNAL MEDICINE

## 2022-10-19 PROCEDURE — 99396 PR PREVENTIVE VISIT,EST,40-64: ICD-10-PCS | Mod: S$GLB,,, | Performed by: INTERNAL MEDICINE

## 2022-10-19 PROCEDURE — 1159F PR MEDICATION LIST DOCUMENTED IN MEDICAL RECORD: ICD-10-PCS | Mod: CPTII,S$GLB,, | Performed by: INTERNAL MEDICINE

## 2022-10-19 PROCEDURE — 3078F PR MOST RECENT DIASTOLIC BLOOD PRESSURE < 80 MM HG: ICD-10-PCS | Mod: CPTII,S$GLB,, | Performed by: INTERNAL MEDICINE

## 2022-10-19 PROCEDURE — 3078F DIAST BP <80 MM HG: CPT | Mod: CPTII,S$GLB,, | Performed by: INTERNAL MEDICINE

## 2022-10-19 PROCEDURE — 99999 PR PBB SHADOW E&M-EST. PATIENT-LVL IV: ICD-10-PCS | Mod: PBBFAC,,, | Performed by: INTERNAL MEDICINE

## 2022-10-19 NOTE — PROGRESS NOTES
This note is specifically for wellness visit performed today.     WELLNESS EXAM      Patient ID: Anna Marie Lindo is a 52 y.o. female.  has a past medical history of GERD (gastroesophageal reflux disease) and Hypokalemia.     Chief Complaint:  Encounter for wellness exam    Well Adult Physical: Patient here for a comprehensive physical exam. Patient reports no problems or complaints today in clinic.     Health Maintenance Topics with due status: Not Due       Topic Last Completion Date    Lipid Panel 08/04/2021    TETANUS VACCINE 02/09/2022    Mammogram 03/23/2022    Colorectal Cancer Screening 04/26/2022        ==============================================    Health Maintenance Due   Topic Date Due    HIV Screening  Never done    Shingles Vaccine (1 of 2) Never done    COVID-19 Vaccine (3 - Booster for Pfizer series) 11/26/2021       Past Medical History:  Past Medical History:   Diagnosis Date    GERD (gastroesophageal reflux disease)     Hypokalemia      Past Surgical History:   Procedure Laterality Date    ABDOMINOPLASTY      BILATERAL TUBAL LIGATION      COLONOSCOPY N/A 4/26/2022    Procedure: COLONOSCOPY;  Surgeon: Ubaldo Gutierrez MD;  Location: [facility];  Service: Endoscopy;  Laterality: N/A;    COSMETIC SURGERY  August 2016    Tummy tuck    TOTAL ABDOMINAL HYSTERECTOMY      TUBAL LIGATION  June 1997     Review of patient's allergies indicates:   Allergen Reactions    Sulfa (sulfonamide antibiotics) Itching     Current Outpatient Medications on File Prior to Visit   Medication Sig Dispense Refill    biotin/calcium carbonate (BIOTIN 100+10 ORAL) Take by mouth.      dextroamphetamine-amphetamine (ADDERALL XR) 25 MG 24 hr capsule Take 1 capsule (25 mg total) by mouth every morning. 30 capsule 0    [START ON 10/31/2022] dextroamphetamine-amphetamine (ADDERALL XR) 25 MG 24 hr capsule Take 1 capsule (25 mg total) by mouth every morning. 30 capsule 0    hydroCHLOROthiazide (HYDRODIURIL) 25 MG tablet Take 1 tablet  (25 mg total) by mouth once daily. 30 tablet 11    pantoprazole (PROTONIX) 40 MG tablet TAKE ONE TABLET BY MOUTH ONCE DAILY 90 tablet 1    potassium chloride (MICRO-K) 10 MEQ CpSR Take 3 capsules (30 mEq total) by mouth once daily. 90 capsule 6    vitamin D (VITAMIN D3) 1000 units Tab Take 10,000 Units by mouth once daily.       No current facility-administered medications on file prior to visit.     Social History     Socioeconomic History    Marital status:    Tobacco Use    Smoking status: Never    Smokeless tobacco: Never   Substance and Sexual Activity    Alcohol use: Not Currently     Alcohol/week: 0.0 standard drinks    Drug use: Not Currently    Sexual activity: Yes     Partners: Male     Family History   Problem Relation Age of Onset    Diabetes Mother     Heart disease Mother     Diabetes Father     Heart disease Father     Early death Brother         Blood clot after surgery    Colon cancer Maternal Grandmother        Review of Systems   Constitutional: Negative for chills, fever and unexpected weight change.   HENT: Negative for ear pain and sore throat.    Eyes: Negative for redness and visual disturbance.   Respiratory: Negative for cough and shortness of breath.    Cardiovascular: Negative for chest pain and palpitations.   Gastrointestinal: Negative for nausea and vomiting.   Musculoskeletal: Negative for arthralgias and myalgias.   Skin: Negative for rash and wound.   Neurological: Negative for weakness and headaches.         Objective:      Vitals:    10/19/22 1450   BP: 116/73   Pulse: 87   Temp: 98.2 °F (36.8 °C)     Body mass index is 25.47 kg/m².     Physical Exam   Constitutional: Oriented to person, place, and time. Appears well-developed and well-nourished. No distress.   HENT:   Head: Normocephalic and atraumatic.   Eyes: Pupils are equal, round, and reactive to light. EOM are normal.   Neck: Normal range of motion. Neck supple.   Cardiovascular: Normal rate, regular rhythm, normal  heart sounds and intact distal pulses.   No murmur heard.  Pulmonary/Chest: Effort normal and breath sounds normal. No respiratory distress. No wheezes.   Musculoskeletal: Normal range of motion. Exhibits no edema.   Neurological: Alert and oriented to person, place, and time. No cranial nerve deficit.   Skin: Skin is warm and dry. Capillary refill takes less than 2 seconds.   Psychiatric: Normal mood and affect. Behavior is normal.   Nursing note and vitals reviewed.      All labs, imaging and procedures performed since last visit have been personally reviewed.    Assessment / Plan:      Patient here for annual wellness exam. Health maintenance was reviewed and ordered.    Complete history and physical was completed today.  Complete and thorough medication reconciliation was performed.  Discussed risks and benefits of medications.  Advised patient on orders and health maintenance.  We discussed old records and old labs if available.  Will request any records not available through epic.  Continue current medications listed on your summary sheet.    All questions were answered. Patient had no further concerns. Advised of diagnoses and plan. Follow up as planned or return sooner if symptoms persist or worsen.     Problem List Items Addressed This Visit    None  Visit Diagnoses       Encounter for annual health examination    -  Primary    Relevant Orders    CBC Without Differential    Vitamin D    Vitamin B12    Folate    Iron and TIBC    TSH            Follow up in about 1 year (around 10/19/2023).    Dacia King MD

## 2022-10-20 ENCOUNTER — TELEPHONE (OUTPATIENT)
Dept: FAMILY MEDICINE | Facility: CLINIC | Age: 52
End: 2022-10-20
Payer: COMMERCIAL

## 2022-10-20 NOTE — TELEPHONE ENCOUNTER
----- Message from Brittney García sent at 10/20/2022 10:19 AM CDT -----  Contact: Pt [phone]  Pt called in regards to lab orders she wanted to know if all the orders are in for A1c as well please advise

## 2022-10-21 ENCOUNTER — LAB VISIT (OUTPATIENT)
Dept: LAB | Facility: HOSPITAL | Age: 52
End: 2022-10-21
Attending: INTERNAL MEDICINE
Payer: COMMERCIAL

## 2022-10-21 DIAGNOSIS — Z00.00 ENCOUNTER FOR ANNUAL HEALTH EXAMINATION: ICD-10-CM

## 2022-10-21 DIAGNOSIS — Z13.6 ENCOUNTER FOR LIPID SCREENING FOR CARDIOVASCULAR DISEASE: ICD-10-CM

## 2022-10-21 DIAGNOSIS — Z13.220 ENCOUNTER FOR LIPID SCREENING FOR CARDIOVASCULAR DISEASE: ICD-10-CM

## 2022-10-21 LAB
25(OH)D3+25(OH)D2 SERPL-MCNC: 112 NG/ML (ref 30–96)
ALBUMIN SERPL BCP-MCNC: 4.1 G/DL (ref 3.5–5.2)
ALP SERPL-CCNC: 83 U/L (ref 55–135)
ALT SERPL W/O P-5'-P-CCNC: 25 U/L (ref 10–44)
ANION GAP SERPL CALC-SCNC: 14 MMOL/L (ref 8–16)
AST SERPL-CCNC: 20 U/L (ref 10–40)
BILIRUB SERPL-MCNC: 0.4 MG/DL (ref 0.1–1)
BUN SERPL-MCNC: 10 MG/DL (ref 6–20)
CALCIUM SERPL-MCNC: 9.6 MG/DL (ref 8.7–10.5)
CHLORIDE SERPL-SCNC: 100 MMOL/L (ref 95–110)
CHOLEST SERPL-MCNC: 212 MG/DL (ref 120–199)
CHOLEST/HDLC SERPL: 3.9 {RATIO} (ref 2–5)
CO2 SERPL-SCNC: 26 MMOL/L (ref 23–29)
CREAT SERPL-MCNC: 0.8 MG/DL (ref 0.5–1.4)
ERYTHROCYTE [DISTWIDTH] IN BLOOD BY AUTOMATED COUNT: 11.5 % (ref 11.5–14.5)
EST. GFR  (NO RACE VARIABLE): >60 ML/MIN/1.73 M^2
FOLATE SERPL-MCNC: 8.4 NG/ML (ref 4–24)
GLUCOSE SERPL-MCNC: 94 MG/DL (ref 70–110)
HCT VFR BLD AUTO: 41.9 % (ref 37–48.5)
HDLC SERPL-MCNC: 54 MG/DL (ref 40–75)
HDLC SERPL: 25.5 % (ref 20–50)
HGB BLD-MCNC: 14.2 G/DL (ref 12–16)
IRON SERPL-MCNC: 68 UG/DL (ref 30–160)
LDLC SERPL CALC-MCNC: 137 MG/DL (ref 63–159)
MCH RBC QN AUTO: 30.5 PG (ref 27–31)
MCHC RBC AUTO-ENTMCNC: 33.9 G/DL (ref 32–36)
MCV RBC AUTO: 90 FL (ref 82–98)
NONHDLC SERPL-MCNC: 158 MG/DL
PLATELET # BLD AUTO: 337 K/UL (ref 150–450)
PMV BLD AUTO: 10 FL (ref 9.2–12.9)
POTASSIUM SERPL-SCNC: 3.6 MMOL/L (ref 3.5–5.1)
PROT SERPL-MCNC: 7.2 G/DL (ref 6–8.4)
RBC # BLD AUTO: 4.65 M/UL (ref 4–5.4)
SATURATED IRON: 16 % (ref 20–50)
SODIUM SERPL-SCNC: 140 MMOL/L (ref 136–145)
TOTAL IRON BINDING CAPACITY: 428 UG/DL (ref 250–450)
TRANSFERRIN SERPL-MCNC: 289 MG/DL (ref 200–375)
TRIGL SERPL-MCNC: 105 MG/DL (ref 30–150)
TSH SERPL DL<=0.005 MIU/L-ACNC: 1.11 UIU/ML (ref 0.4–4)
VIT B12 SERPL-MCNC: 781 PG/ML (ref 210–950)
WBC # BLD AUTO: 6.44 K/UL (ref 3.9–12.7)

## 2022-10-21 PROCEDURE — 82607 VITAMIN B-12: CPT | Performed by: INTERNAL MEDICINE

## 2022-10-21 PROCEDURE — 80061 LIPID PANEL: CPT | Performed by: INTERNAL MEDICINE

## 2022-10-21 PROCEDURE — 84443 ASSAY THYROID STIM HORMONE: CPT | Performed by: INTERNAL MEDICINE

## 2022-10-21 PROCEDURE — 84466 ASSAY OF TRANSFERRIN: CPT | Performed by: INTERNAL MEDICINE

## 2022-10-21 PROCEDURE — 82746 ASSAY OF FOLIC ACID SERUM: CPT | Performed by: INTERNAL MEDICINE

## 2022-10-21 PROCEDURE — 36415 COLL VENOUS BLD VENIPUNCTURE: CPT | Mod: PO | Performed by: INTERNAL MEDICINE

## 2022-10-21 PROCEDURE — 82306 VITAMIN D 25 HYDROXY: CPT | Performed by: INTERNAL MEDICINE

## 2022-10-21 PROCEDURE — 80053 COMPREHEN METABOLIC PANEL: CPT | Performed by: INTERNAL MEDICINE

## 2022-10-21 PROCEDURE — 85027 COMPLETE CBC AUTOMATED: CPT | Performed by: INTERNAL MEDICINE

## 2022-11-02 ENCOUNTER — PATIENT MESSAGE (OUTPATIENT)
Dept: PSYCHIATRY | Facility: CLINIC | Age: 52
End: 2022-11-02
Payer: COMMERCIAL

## 2022-11-02 DIAGNOSIS — F90.0 ADHD (ATTENTION DEFICIT HYPERACTIVITY DISORDER), INATTENTIVE TYPE: ICD-10-CM

## 2022-11-02 RX ORDER — DEXTROAMPHETAMINE SACCHARATE, AMPHETAMINE ASPARTATE MONOHYDRATE, DEXTROAMPHETAMINE SULFATE AND AMPHETAMINE SULFATE 6.25; 6.25; 6.25; 6.25 MG/1; MG/1; MG/1; MG/1
25 CAPSULE, EXTENDED RELEASE ORAL EVERY MORNING
Qty: 30 CAPSULE | Refills: 0 | Status: SHIPPED | OUTPATIENT
Start: 2022-11-02 | End: 2022-11-30

## 2022-11-02 NOTE — TELEPHONE ENCOUNTER
Patient called.     Please cancel the adderall RX that was sent to [facility] in [place] they do not have it in stock.   Please send a new RX over to [facility]

## 2022-11-06 NOTE — PROGRESS NOTES
Results have been released via [employer]. Please verify that these have been viewed by patient. If not, please call patient with results.    I have reviewed your recent results.    B12 NORMAL-The B12 test is currently normal.  No changes are recommended at this time. Iron is normal as well.    CBC NORMAL-The CBC appears to be stable at this time with no sign of major anemia, abnormal white count or platelet abnormality.    CMP/BMP NORMAL-The electrolytes all appear stable at this time.  This includes kidney functions along with routine electrolytes like sugar, potassium and sodium.  If it was a CMP test, the liver enzymes were noted to be stable also.    LIPID-ABNORMAL-Your cholesterol is slightly elevated, however your overall risk of cardiovascular disease is still low.  I would still recommend working on lifestyle modification with low fat, low carb diet and exercise to improve these numbers and further decrease your risk of heart disease.    TSH NORMAL-The TSH screening indicated a normal function of the thyroid.    VITAMIN D-ABNORMAL-The Vitamin D level is high. How much vitamin D have you been taking recently? We need to decrease the dose.     Please let me know if you have any additional questions or concerns about above.

## 2022-11-22 ENCOUNTER — OCCUPATIONAL HEALTH (OUTPATIENT)
Dept: URGENT CARE | Facility: CLINIC | Age: 52
End: 2022-11-22

## 2022-11-22 VITALS
SYSTOLIC BLOOD PRESSURE: 121 MMHG | OXYGEN SATURATION: 98 % | HEIGHT: 64 IN | DIASTOLIC BLOOD PRESSURE: 85 MMHG | TEMPERATURE: 97 F | WEIGHT: 150 LBS | HEART RATE: 90 BPM | BODY MASS INDEX: 25.61 KG/M2

## 2022-11-22 DIAGNOSIS — Z00.00 ENCOUNTER FOR PHYSICAL EXAMINATION: Primary | ICD-10-CM

## 2022-11-22 PROCEDURE — 99172 VISUAL SCREEN, AUTOMATED W/COLOR VISION: ICD-10-PCS | Mod: ,,, | Performed by: PHYSICIAN ASSISTANT

## 2022-11-22 PROCEDURE — 80053 COMPREHEN METABOLIC PANEL: CPT | Mod: QW,,, | Performed by: PHYSICIAN ASSISTANT

## 2022-11-22 PROCEDURE — 81003 URINALYSIS AUTO W/O SCOPE: CPT | Mod: QW,,, | Performed by: PHYSICIAN ASSISTANT

## 2022-11-22 PROCEDURE — 99172 OCULAR FUNCTION SCREEN: CPT | Mod: ,,, | Performed by: PHYSICIAN ASSISTANT

## 2022-11-22 PROCEDURE — 99499 UNLISTED E&M SERVICE: CPT | Mod: ,,, | Performed by: PHYSICIAN ASSISTANT

## 2022-11-22 PROCEDURE — 80061 LIPID PANEL: ICD-10-PCS | Mod: QW,,, | Performed by: PHYSICIAN ASSISTANT

## 2022-11-22 PROCEDURE — 84443 ASSAY THYROID STIM HORMONE: CPT | Mod: QW,,, | Performed by: PHYSICIAN ASSISTANT

## 2022-11-22 PROCEDURE — 93000 EKG 12-LEAD: ICD-10-PCS | Mod: ,,, | Performed by: INTERNAL MEDICINE

## 2022-11-22 PROCEDURE — 80061 LIPID PANEL: CPT | Mod: QW,,, | Performed by: PHYSICIAN ASSISTANT

## 2022-11-22 PROCEDURE — 81003 URINALYSIS: ICD-10-PCS | Mod: QW,,, | Performed by: PHYSICIAN ASSISTANT

## 2022-11-22 PROCEDURE — 84443 TSH: ICD-10-PCS | Mod: QW,,, | Performed by: PHYSICIAN ASSISTANT

## 2022-11-22 PROCEDURE — 86140 C-REACTIVE PROTEIN: CPT | Mod: ,,, | Performed by: PHYSICIAN ASSISTANT

## 2022-11-22 PROCEDURE — 93000 ELECTROCARDIOGRAM COMPLETE: CPT | Mod: ,,, | Performed by: INTERNAL MEDICINE

## 2022-11-22 PROCEDURE — 80053 COMPREHENSIVE METABOLIC PANEL: ICD-10-PCS | Mod: QW,,, | Performed by: PHYSICIAN ASSISTANT

## 2022-11-22 PROCEDURE — 86140 C-REACTIVE PROTEIN: ICD-10-PCS | Mod: ,,, | Performed by: PHYSICIAN ASSISTANT

## 2022-11-22 PROCEDURE — 99499 PHYSICAL, BASIC COMPLEXITY: ICD-10-PCS | Mod: ,,, | Performed by: PHYSICIAN ASSISTANT

## 2022-11-29 ENCOUNTER — PATIENT MESSAGE (OUTPATIENT)
Dept: PSYCHIATRY | Facility: CLINIC | Age: 52
End: 2022-11-29
Payer: COMMERCIAL

## 2022-11-30 ENCOUNTER — OFFICE VISIT (OUTPATIENT)
Dept: PSYCHIATRY | Facility: CLINIC | Age: 52
End: 2022-11-30
Payer: COMMERCIAL

## 2022-11-30 DIAGNOSIS — F90.0 ADHD (ATTENTION DEFICIT HYPERACTIVITY DISORDER), INATTENTIVE TYPE: ICD-10-CM

## 2022-11-30 PROCEDURE — 99214 OFFICE O/P EST MOD 30 MIN: CPT | Mod: 95,,,

## 2022-11-30 PROCEDURE — 1159F PR MEDICATION LIST DOCUMENTED IN MEDICAL RECORD: ICD-10-PCS | Mod: CPTII,95,,

## 2022-11-30 PROCEDURE — 1160F RVW MEDS BY RX/DR IN RCRD: CPT | Mod: CPTII,95,,

## 2022-11-30 PROCEDURE — 1159F MED LIST DOCD IN RCRD: CPT | Mod: CPTII,95,,

## 2022-11-30 PROCEDURE — 1160F PR REVIEW ALL MEDS BY PRESCRIBER/CLIN PHARMACIST DOCUMENTED: ICD-10-PCS | Mod: CPTII,95,,

## 2022-11-30 PROCEDURE — 99214 PR OFFICE/OUTPT VISIT, EST, LEVL IV, 30-39 MIN: ICD-10-PCS | Mod: 95,,,

## 2022-11-30 RX ORDER — DEXTROAMPHETAMINE SACCHARATE, AMPHETAMINE ASPARTATE MONOHYDRATE, DEXTROAMPHETAMINE SULFATE AND AMPHETAMINE SULFATE 7.5; 7.5; 7.5; 7.5 MG/1; MG/1; MG/1; MG/1
30 CAPSULE, EXTENDED RELEASE ORAL EVERY MORNING
Qty: 30 CAPSULE | Refills: 0 | Status: SHIPPED | OUTPATIENT
Start: 2022-11-30 | End: 2022-12-30

## 2022-11-30 RX ORDER — DEXTROAMPHETAMINE SACCHARATE, AMPHETAMINE ASPARTATE MONOHYDRATE, DEXTROAMPHETAMINE SULFATE AND AMPHETAMINE SULFATE 7.5; 7.5; 7.5; 7.5 MG/1; MG/1; MG/1; MG/1
30 CAPSULE, EXTENDED RELEASE ORAL EVERY MORNING
Qty: 30 CAPSULE | Refills: 0 | Status: SHIPPED | OUTPATIENT
Start: 2023-01-30 | End: 2023-02-06

## 2022-11-30 RX ORDER — DEXTROAMPHETAMINE SACCHARATE, AMPHETAMINE ASPARTATE MONOHYDRATE, DEXTROAMPHETAMINE SULFATE AND AMPHETAMINE SULFATE 7.5; 7.5; 7.5; 7.5 MG/1; MG/1; MG/1; MG/1
30 CAPSULE, EXTENDED RELEASE ORAL EVERY MORNING
Qty: 30 CAPSULE | Refills: 0 | Status: SHIPPED | OUTPATIENT
Start: 2022-12-30 | End: 2023-01-29

## 2022-11-30 NOTE — PROGRESS NOTES
Outpatient Psychiatry Follow-Up Visit (MD/NP)    11/30/2022    Clinical Status of Patient:  Outpatient (Virtual)  The patient location is: [facility] [place] [address]  The patient location [place] is: [place]  The patient phone number is: [phone]   Visit type: Virtual visit with synchronous audio and video  Each patient to whom he or she provides medical services by telemedicine is:  (1) informed of the relationship between the practitioner and patient and the respective role of any other health care provider with respect to management of the patient; and (2) notified that he or she may decline to receive medical services by telemedicine and may withdraw from such care at any time.    Chief Complaint:  Anna Marie Lindo is a 52 y.o. female who presents today for follow-up of attention problems.  Met with patient.      Interval History and Content of Current Session:  Interim Events/Subjective Report/Content of Current Session:     Pt is a 52 y.o. female with past history of ADHD who presents for follow up treatment. Pt established care with me on 2/23/22, where Pt met criteria for ADHD. Pt is currently taking Adderall XR 25 mg po q.a.m..  Pt reports past trials of alprazolam.  Medications tolerated well and provide good relief of symptoms overall.     Adderall continues to provide some relief of symptoms but patient reports there is room for improvement as she repeatedly finds herself unable to pay attention while at work. Pt denies cardiovascular events including increased heart rate or increased blood pressure, palpitations, chest pain, dizziness, lightheadedness, or syncopal episodes. Pt denies A/V hallucinations or behavioral effects. Pt denies anorexia, decreased appetite, xerostomia, headache, insomnia, or digital changes.      Initial HPI: Pt. is a 51 y.o. female, with no past psychiatric hx presenting to the clinic for an initial evaluation and treatment. PMHx outlined below. Pt is  "currently taking no psychotropic medications. Pt notes past trials of alprazolam.        Pt presents today with symptoms of lifelong inattention and concentration deficit causing impairment in her family life as well as her career. She notes her 3 daughters have all been diagnosed and treated for ADHD, however the Pt has never sought treatment.  She notes she is easily distracted and frequently becomes sidetracked both at work and at home. She states multiple coworkers have suggested she be tested for ADHD. Pt reports as a child in school she had to study more than her peers and notes she had to repeat material many times, writing it, stating it out loud, and then repeating it again and again. She notes "When people speak to me it takes a minute to process." She also reports, "Sometimes I can't get my mouth to go as quickly as my thoughts are going." See ADHD screening below.     Pt describes mood as "Good." She notes a history of depression in her family and states she engages in physical activity when she begins to have feelings of sadness or depression. Pt reports she sleeps on average ~7 hours per night and denies difficulty initiating or maintaining sleep.  Pt does report some anxiety, but states this has never interfered with her life. She also notes some mild social anxiety.     ADHD Screening:  Trouble paying close attention to details, or careless mistakes: yes, "if I make mistakes its because I didn't pay attn to details"  Difficulty sustaining attention/remaining focused: frequently  Absent minded/wandeing thoughts during conversation: yes  Doesn't follow through on instructions, starts tasks but does not finish or easily distracted: frequently d/t distraction  Difficulty with organizing: occasionally   Avoids/dislikes tasks that require sustained attention: frequently  Looses important things:  Has developed routines and places for important items to go so they don't get lost  Easily distracted by " "extraneous stimuli: frequently  Forgetful in daily activities: frequently d/t distraction  ------  Often fidgets/squirms: occasionally when anxious  Often leaves seat at inappropriate times: no  Runs around, climbing on things or feeling restless: no  Unable to engage in leisure activities: no  Often "on the go" , motor driven: no  Often talks excessively: yes, mario alberto when nervous  Blurts out, interrupts: frequently  Can't wait turn: no  Often interrupts/intrudes: frequently       Past Psychiatric History:  First psych contact:  Today, 2/23/22  Prior hospitalizations: denies  Prior suicide attempts or self-harm: denies  Prior diagnosis: denies  Prior meds: xanax   Current meds: none  Prior psychotherapy: denies    Past Medical hx:   Past Medical History:   Diagnosis Date    GERD (gastroesophageal reflux disease)     Hypokalemia    Hx of TBI: denies  Hx of seizures: denies    Family Hx:   Paternal: grandmother alcoholic; no psychiatric history or history of substance abuse or suicide  Maternal: mom undiagnosed psych estranged from kids; no history of substance abuse or suicide     Social Hx:   Childhood: born and raised in [place]  Marital Status:   Children: 3 daughters, all ADHD  Resides: [place]  Occupation:    Hobbies: denita  Sikhism: [employer]  Education level: high school graduate  : denies  Legal: denies  Access to guns: yes     Substance Hx:  Caffeine: diet coke, 6 per day  Tobacco: denies  Alcohol: occasional ~2-3 drinks at a time  Drug use: Denied current use.  Denied history of abuse or dependency.  Rehab: denies  Prior/current AA: denies      Interim hx:     Medication changes last visit:  8/31/2022  Continue Adderall XR 25mg p.o. q.a.m. for symptoms of attention and concentration deficit      Anxiety: denies  Depression: denies     Alcohol/Drugs/Caffeine/Tobacco: No change in consumption      Review of Systems   PSYCHIATRIC: Pertinant items are noted in the " narrative.  All other systems reviewed and found to be negative       Past Medical, Family and Social History: The patient's past medical, family and social history have been reviewed and updated as appropriate within the electronic medical record - see encounter notes.    Adherence: yes    Side effects: see above    Risk Parameters:  Patient reports no suicidal ideation  Patient reports no homicidal ideation  Patient reports no self-injurious behavior  Patient reports no violent behavior    Exam   Constitutional  Vitals:  Most recent vital signs, dated greater than 90 days prior to this appointment, were reviewed.   Last 3 sets of Vitals    Vitals - 1 value per visit 10/19/2022 10/19/2022 11/22/2022   SYSTOLIC - 116 121   DIASTOLIC - 73 85   Pulse - 87 90   Temp - 98.2 97   Resp - - -   SPO2 - - 98   Weight (lb) - 148.37 150   Weight (kg) - 67.3 68.04   Height - 64 63.5   BMI (Calculated) - 25.5 26.2   VISIT REPORT - - -   Pain Score  0 - -          General:  unremarkable, age appropriate     Musculoskeletal  Muscle Strength/Tone:  Unable to assess d/t nature of virtual visit   Gait & Station:  Unable to assess d/t nature of virtual visit     Psychiatric  Speech:  no latency; no press   Mood & Affect:  euthymic  congruent and appropriate   Thought Process:  normal and logical   Associations:  intact   Thought Content:  normal, no suicidality, no homicidality, delusions, or paranoia   Insight:  intact   Judgement: behavior is adequate to circumstances   Orientation:  grossly intact   Memory: intact for content of interview   Language: grossly intact   Attention Span & Concentration:  able to focus   Fund of Knowledge:  intact and appropriate to age and level of education     Suicide Risk Assessment:  Protective factors: age, gender, no prior attempts, no prior hospitalizations, no ongoing substance abuse, no psychosis, , has children, denies SI/intent/plan, seeking treatment, access to treatment, future  oriented, good primary support, no access to firearms  Risks: ongoing symptoms of attention and concentration deficit  Patient is a low immediate and long-term risk considering risk factors. Patient denied suicidal or homicidal ideation, plan, or intent.  Patient noted agreement to call 911 and/or present to the nearest emergency department if Pt develops suicidal or homicidal ideation, plan, or intent.      Assessment and Diagnosis   Status/Progress: Based on the examination today, the patient's problem(s) is/are adequately but not ideally controlled.  New problems have not been presented today.   Co-morbidities are not complicating management of the primary condition.  There are no active rule-out diagnoses for this patient at this time.     General Impression: Pt is a 52 y.o. female with past history of ADHD who presents for follow up treatment.  Today, patient reports Adderall provides relief of symptoms but notes there is room for improvement as she has been increasingly inattentive at work.  Through shared decision making pharmacological intervention will be continued at this time.    This patient's profile was checked on the [place] Prescription Monitoring Program. No aberrant patterns or evidence of misuse.    Safe for outpatient follow up and no acute safety concerns.    Encounter Diagnosis   Name Primary?    ADHD (attention deficit hyperactivity disorder), inattentive type            Intervention/Counseling/Treatment Plan   Medication Management: The risks and benefits of medication were discussed with the patient. Shared decision making occurred   The treatment plan and follow up plan were reviewed with the patient.   Increase Adderall XR to 30 mg p.o. q.a.m. for symptoms of attention and concentration deficit  Offered referral for individual psychotherapy.  Counseled on regular exercise, maintenance of a healthy weight, balanced diet rich in fruits/vegetables and lean protein, and avoidance of  unhealthy habits like smoking and excessive alcohol intake.  Call to report any worsening of symptoms or problems with the medication. Pt instructed to go to ER with thoughts of harming self, others  Labs: no new orders    ADHD (attention deficit hyperactivity disorder), inattentive type  -     dextroamphetamine-amphetamine (ADDERALL XR) 30 MG 24 hr capsule; Take 1 capsule (30 mg total) by mouth every morning.  Dispense: 30 capsule; Refill: 0  -     dextroamphetamine-amphetamine (ADDERALL XR) 30 MG 24 hr capsule; Take 1 capsule (30 mg total) by mouth every morning.  Dispense: 30 capsule; Refill: 0  -     dextroamphetamine-amphetamine (ADDERALL XR) 30 MG 24 hr capsule; Take 1 capsule (30 mg total) by mouth every morning.  Dispense: 30 capsule; Refill: 0          Return to Clinic: 3 months or sooner if needed      Medication Management: The risks and benefits of stimulant medication were discussed.     Patient has no contraindications: no h/o allergic rxn, agitation, arrythmia, cardiovascular disease, cardiac structural abnormalities, hyperthyroidism, glaucoma, etc.   Completed cardiac screen prior to initiation.   Discussed stimulant side effects including effects on sleep, appetite, cardiac concerns, chest pain, psychosis, wilber, aggression, HTN, MI, stroke, arrythmia, seizure, anaphylaxis or other allergic reactions, leukopenia, nervousness, anorexia, insomnia, tachycardia, palpitations, dizziness, BP changes, HR changes, visual disturbance, and headaches  Discussed medication being a controlled substance, to place medication in a secured place, and the risk of dependency. Discussed to never use this medication in combination with illicit drugs, alcohol, or sedatives.   Patient to sign consent for treatment with a controlled substance document,.  Discussed diagnosis, risks and benefits of proposed treatment vs alternative treatments vs no treatment, and potential side effects of these treatments (death, dependency,  etc.). The patient expresses understanding of the above and displays the capacity to agree with this treatment given said understanding. Patient also agrees that, currently, the benefits outweigh the risks and would like to pursue treatment at this time.    -Educated patient about appropriate treatment options incl. stimulant medication, nonstimulant medication, and therapy.   -Educated patient about appropriate use of ADHD medications, common side effects of the medications (cardiac being most significant) and when to call about complications.   -Take any c/o chest pain seriously & seek immediate medical attention.      -Pt given contact number for psychotherapists at [facility] and also instructed they may check with their health insurance provider for a list of providers  -Spent 30 minutes face to face with the Pt; >50% time spent in counseling   -Questions were sought and answered to the Pt's stated verbal satisfaction.  -Supportive therapy and psychoeducation provided.  -Risks, benefits, and side effects of medications discussed with the Pt who expresses understanding and chooses to take medications as prescribed.   -Pt instructed to call clinic, 911, or go to nearest emergency room if symptoms worsen or pt is in crisis. The Pt expresses understanding.    DISCLAIMER: This note was prepared with [employer] voice recognition transcription software. Garbled syntax, mangled pronouns, and other bizarre constructions may be attributed to that software system     VLADIMIR Bradford, PMHNP-BC  Department of Psychiatry - [facility]  [address]  [place] [address]  Office: [phone]  Fax: [phone]

## 2023-01-20 DIAGNOSIS — K21.9 GASTROESOPHAGEAL REFLUX DISEASE WITHOUT ESOPHAGITIS: ICD-10-CM

## 2023-01-20 RX ORDER — PANTOPRAZOLE SODIUM 40 MG/1
TABLET, DELAYED RELEASE ORAL
Qty: 90 TABLET | Refills: 2 | Status: SHIPPED | OUTPATIENT
Start: 2023-01-20 | End: 2023-08-17 | Stop reason: SDUPTHER

## 2023-01-20 NOTE — TELEPHONE ENCOUNTER
No new care gaps identified.  [facility] Embedded Care Gaps. Reference number: [id number]. 1/20/2023   2:14:20 PM CST

## 2023-01-21 NOTE — TELEPHONE ENCOUNTER
Refill Decision Note   Anna Marie Lindo  is requesting a refill authorization.  Brief Assessment and Rationale for Refill:  Approve     Medication Therapy Plan:       Medication Reconciliation Completed: No   Comments:     No Care Gaps recommended.     Note composed:6:37 PM 01/20/2023

## 2023-02-03 ENCOUNTER — TELEPHONE (OUTPATIENT)
Dept: PSYCHIATRY | Facility: CLINIC | Age: 53
End: 2023-02-03
Payer: COMMERCIAL

## 2023-02-03 ENCOUNTER — PATIENT MESSAGE (OUTPATIENT)
Dept: PSYCHIATRY | Facility: CLINIC | Age: 53
End: 2023-02-03
Payer: COMMERCIAL

## 2023-02-06 DIAGNOSIS — F90.0 ADHD (ATTENTION DEFICIT HYPERACTIVITY DISORDER), INATTENTIVE TYPE: ICD-10-CM

## 2023-02-06 RX ORDER — DEXTROAMPHETAMINE SACCHARATE, AMPHETAMINE ASPARTATE MONOHYDRATE, DEXTROAMPHETAMINE SULFATE AND AMPHETAMINE SULFATE 7.5; 7.5; 7.5; 7.5 MG/1; MG/1; MG/1; MG/1
30 CAPSULE, EXTENDED RELEASE ORAL EVERY MORNING
Qty: 7 CAPSULE | Refills: 0 | Status: SHIPPED | OUTPATIENT
Start: 2023-02-06 | End: 2023-02-14

## 2023-02-07 ENCOUNTER — TELEPHONE (OUTPATIENT)
Dept: PSYCHIATRY | Facility: CLINIC | Age: 53
End: 2023-02-07
Payer: COMMERCIAL

## 2023-02-07 NOTE — TELEPHONE ENCOUNTER
PA for Adderall XR 30MG er capsules Denied on February 6. Per insurance:    CaseId:[id number];Status:Denied;Review Type:Prior Auth;Appeal Information: Attention:ATTN: [employer] [address] [address],[place], [place],[address] Phone:[phone] Fax:[phone]; Important - Please read the below note on eAppeals: Please reference the denial letter for information on the rights for an appeal, rationale for the denial, and how to submit an appeal including if any information is needed to support the appeal. Note about urgent situations - Generally, an urgent situation is one which, in the opinion of the provider, the health of the patient may be in serious jeopardy or may experience pain that cannot be adequately controlled while waiting for a decision on the appeal.;

## 2023-02-14 ENCOUNTER — OFFICE VISIT (OUTPATIENT)
Dept: PSYCHIATRY | Facility: CLINIC | Age: 53
End: 2023-02-14
Payer: COMMERCIAL

## 2023-02-14 DIAGNOSIS — F90.0 ADHD (ATTENTION DEFICIT HYPERACTIVITY DISORDER), INATTENTIVE TYPE: ICD-10-CM

## 2023-02-14 PROCEDURE — 1159F PR MEDICATION LIST DOCUMENTED IN MEDICAL RECORD: ICD-10-PCS | Mod: CPTII,95,,

## 2023-02-14 PROCEDURE — 1160F RVW MEDS BY RX/DR IN RCRD: CPT | Mod: CPTII,95,,

## 2023-02-14 PROCEDURE — 99213 PR OFFICE/OUTPT VISIT, EST, LEVL III, 20-29 MIN: ICD-10-PCS | Mod: 95,,,

## 2023-02-14 PROCEDURE — 1160F PR REVIEW ALL MEDS BY PRESCRIBER/CLIN PHARMACIST DOCUMENTED: ICD-10-PCS | Mod: CPTII,95,,

## 2023-02-14 PROCEDURE — 1159F MED LIST DOCD IN RCRD: CPT | Mod: CPTII,95,,

## 2023-02-14 PROCEDURE — 99213 OFFICE O/P EST LOW 20 MIN: CPT | Mod: 95,,,

## 2023-02-14 RX ORDER — DEXTROAMPHETAMINE SACCHARATE, AMPHETAMINE ASPARTATE MONOHYDRATE, DEXTROAMPHETAMINE SULFATE AND AMPHETAMINE SULFATE 7.5; 7.5; 7.5; 7.5 MG/1; MG/1; MG/1; MG/1
30 CAPSULE, EXTENDED RELEASE ORAL EVERY MORNING
Qty: 90 CAPSULE | Refills: 0 | Status: SHIPPED | OUTPATIENT
Start: 2023-02-14 | End: 2023-05-10 | Stop reason: SDUPTHER

## 2023-02-14 NOTE — PROGRESS NOTES
Outpatient Psychiatry Follow-Up Visit (MD/NP)    2/14/2023    Clinical Status of Patient:  Outpatient (Virtual)  The patient location is: [place] [address]  The patient location [place] is: [facility]  The patient phone number is: [phone]   Visit type: Virtual visit with synchronous audio and video  Each patient to whom he or she provides medical services by telemedicine is:  (1) informed of the relationship between the practitioner and patient and the respective role of any other health care provider with respect to management of the patient; and (2) notified that he or she may decline to receive medical services by telemedicine and may withdraw from such care at any time.    Chief Complaint:  Anna Marie Lindo is a 52 y.o. female who presents today for follow-up of attention problems.  Met with patient.        LAST VISIT SYNOPSIS AND INTERVAL NOTES  Last seen 11/30/2022  General Impression: Pt is a 52 y.o. female with past history of ADHD who presents for follow up treatment.  Today, patient reports Adderall provides relief of symptoms but notes there is room for improvement as she has been increasingly inattentive at work.  Through shared decision making pharmacological intervention will be continued at this time.          Encounter Diagnosis   Name Primary?    ADHD (attention deficit hyperactivity disorder), inattentive type      Plan  Increase Adderall XR to 30 mg p.o. q.a.m. for symptoms of attention and concentration deficit  Offered referral for individual psychotherapy.      PSYCHOTROPIC MEDICATION HISTORY (Highest Dose)  Alprazolam      Interval History and Content of Current Session:  Interim Events/Subjective Report/Content of Current Session:     ADHD  Meds:  Adderall XR to 30 mg p.o. q.a.m.  Inattentive symptoms:  Controlled  Hyperactive symptoms:  Controlled  Behavior at home:  Stable  Behavior at work:  Stable, productive  Side effects:  Denies  Weight:  Stable, no appetite  suppression concerns reported     Adderall XR was increased from 25 to 30 mg q.a.m. at last visit. Pt reports improvement in symptoms with this dose increase. Symptoms are controlled throughout the day and Pt states she is not experiencing insomnia.   Patient denies significant sleep concerns, or medication adverse effects.  No interval episodes with symptoms consistent with wilber or hypomania.  Denied interval or current suicidal/homicidal thoughts, intent, or plan or NSSI.  Denied other questions and concerns.  Patient reports feeling stable and wishing to continue current management unchanged.      11/30/2022: Adderall continues to provide some relief of symptoms but patient reports there is room for improvement as she repeatedly finds herself unable to pay attention while at work. Pt denies cardiovascular events including increased heart rate or increased blood pressure, palpitations, chest pain, dizziness, lightheadedness, or syncopal episodes. Pt denies A/V hallucinations or behavioral effects. Pt denies anorexia, decreased appetite, xerostomia, headache, insomnia, or digital changes.      Initial HPI: Pt. is a 51 y.o. female, with no past psychiatric hx presenting to the clinic for an initial evaluation and treatment. PMHx outlined below. Pt is currently taking no psychotropic medications. Pt notes past trials of alprazolam.        Pt presents today with symptoms of lifelong inattention and concentration deficit causing impairment in her family life as well as her career. She notes her 3 daughters have all been diagnosed and treated for ADHD, however the Pt has never sought treatment.  She notes she is easily distracted and frequently becomes sidetracked both at work and at home. She states multiple coworkers have suggested she be tested for ADHD. Pt reports as a child in school she had to study more than her peers and notes she had to repeat material many times, writing it, stating it out loud, and then  "repeating it again and again. She notes "When people speak to me it takes a minute to process." She also reports, "Sometimes I can't get my mouth to go as quickly as my thoughts are going." See ADHD screening below.     Pt describes mood as "Good." She notes a history of depression in her family and states she engages in physical activity when she begins to have feelings of sadness or depression. Pt reports she sleeps on average ~7 hours per night and denies difficulty initiating or maintaining sleep.  Pt does report some anxiety, but states this has never interfered with her life. She also notes some mild social anxiety.     ADHD Screening:  Trouble paying close attention to details, or careless mistakes: yes, "if I make mistakes its because I didn't pay attn to details"  Difficulty sustaining attention/remaining focused: frequently  Absent minded/wandeing thoughts during conversation: yes  Doesn't follow through on instructions, starts tasks but does not finish or easily distracted: frequently d/t distraction  Difficulty with organizing: occasionally   Avoids/dislikes tasks that require sustained attention: frequently  Looses important things:  Has developed routines and places for important items to go so they don't get lost  Easily distracted by extraneous stimuli: frequently  Forgetful in daily activities: frequently d/t distraction  ------  Often fidgets/squirms: occasionally when anxious  Often leaves seat at inappropriate times: no  Runs around, climbing on things or feeling restless: no  Unable to engage in leisure activities: no  Often "on the go" , motor driven: no  Often talks excessively: yes, mario alberto when nervous  Blurts out, interrupts: frequently  Can't wait turn: no  Often interrupts/intrudes: frequently       Past Psychiatric History:  First psych contact:  Today, 2/23/22  Prior hospitalizations: denies  Prior suicide attempts or self-harm: denies  Prior diagnosis: denies  Prior meds: xanax   Current " meds: none  Prior psychotherapy: denies    Past Medical hx:   Past Medical History:   Diagnosis Date    GERD (gastroesophageal reflux disease)     Hypokalemia    Hx of TBI: denies  Hx of seizures: denies    Family Hx:   Paternal: grandmother alcoholic; no psychiatric history or history of substance abuse or suicide  Maternal: mom undiagnosed psych estranged from kids; no history of substance abuse or suicide     Social Hx:   Childhood: born and raised in [place]  Marital Status:   Children: 3 daughters, all ADHD  Resides: [place]  Occupation:    Hobbies: denita  [employer]: [employer]astaf[employer]  Education level: high school graduate  : denies  Legal: denies  Access to guns: yes     Substance Hx:  Caffeine: diet coke, 6 per day  Tobacco: denies  Alcohol: occasional ~2-3 drinks at a time  Drug use: Denied current use.  Denied history of abuse or dependency.  Rehab: denies  Prior/current AA: denies        Review of Systems   PSYCHIATRIC: Pertinant items are noted in the narrative.  All other systems reviewed and found to be negative       Interim hx:     Alcohol/Drugs/Caffeine/Tobacco: No change in consumption    Past Medical, Family and Social History: The patient's past medical, family and social history have been reviewed and updated as appropriate within the electronic medical record - see encounter notes.    Adherence: yes    Side effects: None    Risk Parameters:  Patient reports no suicidal ideation  Patient reports no homicidal ideation  Patient reports no self-injurious behavior  Patient reports no violent behavior    Exam   Constitutional  Vitals:  Most recent vital signs, dated greater than 90 days prior to this appointment, were reviewed.   Last 3 sets of Vitals    Vitals - 1 value per visit 10/19/2022 10/19/2022 11/22/2022   SYSTOLIC - 116 121   DIASTOLIC - 73 85   Pulse - 87 90   Temp - 98.2 97   Resp - - -   SPO2 - - 98   Weight (lb) - 148.37 150   Weight (kg) - 67.3  68.04   Height - 64 63.5   BMI (Calculated) - 25.5 26.2   VISIT REPORT - - -   Pain Score  0 - -          General:  unremarkable, age appropriate     Musculoskeletal  Muscle Strength/Tone:  No tremors appreciated   Gait & Station:  Unable to assess, Pt seated during virtual visit     Psychiatric  Speech:  no latency; no press   Mood & Affect:  euthymic  congruent and appropriate   Thought Process:  normal and logical   Associations:  intact   Thought Content:  normal, no suicidality, no homicidality, delusions, or paranoia   Insight:  intact   Judgement: behavior is adequate to circumstances   Orientation:  grossly intact   Memory: intact for content of interview   Language: grossly intact   Attention Span & Concentration:  able to focus   Fund of Knowledge:  intact and appropriate to age and level of education     Suicide Risk Assessment:  Protective factors: age, gender, no prior attempts, no prior hospitalizations, no ongoing substance abuse, no psychosis, , has children, denies SI/intent/plan, seeking treatment, access to treatment, future oriented, good primary support, no access to firearms  Risks: ongoing symptoms of attention and concentration deficit  Patient is a low immediate and long-term risk considering risk factors.     Assessment and Diagnosis   Status/Progress: Based on the examination today, the patient's problem(s) is/are well controlled.  New problems have not been presented today.   Co-morbidities are not complicating management of the primary condition.  There are no active rule-out diagnoses for this patient at this time.     General Impression: Pt is a 52 y.o. female with past history of ADHD who presents for follow up treatment. The patient reports well controlled symptoms and denies any current or interval psychiatric complaints. The patient appears stable, and MSE remains unremarkable. Patient is not in immediate risk of harm to self or others and is not considered at risk of  deterioration. Patient instructed to contact the clinic if symptoms worsen or if questions concerning treatment plan arise. No changes in management.    This patient's profile was checked on the [place] Prescription Monitoring Program. No aberrant patterns or evidence of misuse.    Encounter Diagnosis   Name Primary?    ADHD (attention deficit hyperactivity disorder), inattentive type          Intervention/Counseling/Treatment Plan   Medication Management: The risks and benefits of medication were discussed with the patient. Shared decision making occurred   The treatment plan and follow up plan were reviewed with the patient.     Continue Adderall XR 30 mg p.o. q.a.m. for symptoms of attention and concentration deficit    Offered referral for individual psychotherapy.  Counseled on regular exercise, maintenance of a healthy weight, balanced diet rich in fruits/vegetables and lean protein, and avoidance of unhealthy habits like smoking and excessive alcohol intake.  Call to report any worsening of symptoms or problems with the medication. Pt instructed to go to ER with thoughts of harming self, others  Labs: no new orders    ADHD (attention deficit hyperactivity disorder), inattentive type  -     dextroamphetamine-amphetamine (ADDERALL XR) 30 MG 24 hr capsule; Take 1 capsule (30 mg total) by mouth every morning.  Dispense: 90 capsule; Refill: 0          Return to Clinic: 3 months or sooner if needed      -Pt given contact number for psychotherapists at [facility] and also instructed they may check with their health insurance provider for a list of providers  -Spent 30 minutes face to face with the Pt; >50% time spent in counseling   -Questions were sought and answered to the Pt's stated verbal satisfaction.  -Supportive therapy and psychoeducation provided.  -Risks, benefits, and side effects of medications discussed with the Pt who expresses understanding and chooses to take medications as prescribed.    -Pt instructed to call clinic, 911, or go to nearest emergency room if symptoms worsen or pt is in crisis. The Pt expresses understanding.    DISCLAIMER: This note was prepared with [employer]ALE[employer] Direct voice recognition transcription software. Garbled syntax, mangled pronouns, and other bizarre constructions may be attributed to that software system     VLADIMIR Bradford, PMHNP-BC  Department of Psychiatry - [facility]  [address]  [place] [address]  Office: [phone]  Fax: [phone]

## 2023-03-29 ENCOUNTER — OFFICE VISIT (OUTPATIENT)
Dept: FAMILY MEDICINE | Facility: CLINIC | Age: 53
End: 2023-03-29
Payer: COMMERCIAL

## 2023-03-29 ENCOUNTER — LAB VISIT (OUTPATIENT)
Dept: LAB | Facility: HOSPITAL | Age: 53
End: 2023-03-29
Attending: PHYSICIAN ASSISTANT
Payer: COMMERCIAL

## 2023-03-29 VITALS
SYSTOLIC BLOOD PRESSURE: 120 MMHG | HEART RATE: 80 BPM | HEIGHT: 64 IN | WEIGHT: 149.19 LBS | DIASTOLIC BLOOD PRESSURE: 92 MMHG | BODY MASS INDEX: 25.47 KG/M2

## 2023-03-29 DIAGNOSIS — E53.8 B12 DEFICIENCY: ICD-10-CM

## 2023-03-29 DIAGNOSIS — N28.1 RENAL CYST, LEFT: ICD-10-CM

## 2023-03-29 DIAGNOSIS — E83.42 HYPOMAGNESEMIA: ICD-10-CM

## 2023-03-29 DIAGNOSIS — R73.9 HYPERGLYCEMIA: ICD-10-CM

## 2023-03-29 DIAGNOSIS — E87.6 HYPOKALEMIA: ICD-10-CM

## 2023-03-29 DIAGNOSIS — K21.9 GASTROESOPHAGEAL REFLUX DISEASE WITHOUT ESOPHAGITIS: Primary | ICD-10-CM

## 2023-03-29 LAB
ANION GAP SERPL CALC-SCNC: 11 MMOL/L (ref 8–16)
BUN SERPL-MCNC: 10 MG/DL (ref 6–20)
CALCIUM SERPL-MCNC: 10 MG/DL (ref 8.7–10.5)
CHLORIDE SERPL-SCNC: 98 MMOL/L (ref 95–110)
CO2 SERPL-SCNC: 32 MMOL/L (ref 23–29)
CREAT SERPL-MCNC: 0.7 MG/DL (ref 0.5–1.4)
EST. GFR  (NO RACE VARIABLE): >60 ML/MIN/1.73 M^2
GLUCOSE SERPL-MCNC: 86 MG/DL (ref 70–110)
MAGNESIUM SERPL-MCNC: 1.8 MG/DL (ref 1.6–2.6)
POTASSIUM SERPL-SCNC: 3.1 MMOL/L (ref 3.5–5.1)
SODIUM SERPL-SCNC: 141 MMOL/L (ref 136–145)

## 2023-03-29 PROCEDURE — 99999 PR PBB SHADOW E&M-EST. PATIENT-LVL IV: CPT | Mod: PBBFAC,,, | Performed by: PHYSICIAN ASSISTANT

## 2023-03-29 PROCEDURE — 1160F PR REVIEW ALL MEDS BY PRESCRIBER/CLIN PHARMACIST DOCUMENTED: ICD-10-PCS | Mod: CPTII,S$GLB,, | Performed by: PHYSICIAN ASSISTANT

## 2023-03-29 PROCEDURE — 3080F PR MOST RECENT DIASTOLIC BLOOD PRESSURE >= 90 MM HG: ICD-10-PCS | Mod: CPTII,S$GLB,, | Performed by: PHYSICIAN ASSISTANT

## 2023-03-29 PROCEDURE — 80048 BASIC METABOLIC PNL TOTAL CA: CPT | Performed by: PHYSICIAN ASSISTANT

## 2023-03-29 PROCEDURE — 3008F BODY MASS INDEX DOCD: CPT | Mod: CPTII,S$GLB,, | Performed by: PHYSICIAN ASSISTANT

## 2023-03-29 PROCEDURE — 3008F PR BODY MASS INDEX (BMI) DOCUMENTED: ICD-10-PCS | Mod: CPTII,S$GLB,, | Performed by: PHYSICIAN ASSISTANT

## 2023-03-29 PROCEDURE — 83735 ASSAY OF MAGNESIUM: CPT | Performed by: PHYSICIAN ASSISTANT

## 2023-03-29 PROCEDURE — 83036 HEMOGLOBIN GLYCOSYLATED A1C: CPT | Performed by: PHYSICIAN ASSISTANT

## 2023-03-29 PROCEDURE — 99214 OFFICE O/P EST MOD 30 MIN: CPT | Mod: S$GLB,,, | Performed by: PHYSICIAN ASSISTANT

## 2023-03-29 PROCEDURE — 1160F RVW MEDS BY RX/DR IN RCRD: CPT | Mod: CPTII,S$GLB,, | Performed by: PHYSICIAN ASSISTANT

## 2023-03-29 PROCEDURE — 1159F PR MEDICATION LIST DOCUMENTED IN MEDICAL RECORD: ICD-10-PCS | Mod: CPTII,S$GLB,, | Performed by: PHYSICIAN ASSISTANT

## 2023-03-29 PROCEDURE — 99214 PR OFFICE/OUTPT VISIT, EST, LEVL IV, 30-39 MIN: ICD-10-PCS | Mod: S$GLB,,, | Performed by: PHYSICIAN ASSISTANT

## 2023-03-29 PROCEDURE — 3074F PR MOST RECENT SYSTOLIC BLOOD PRESSURE < 130 MM HG: ICD-10-PCS | Mod: CPTII,S$GLB,, | Performed by: PHYSICIAN ASSISTANT

## 2023-03-29 PROCEDURE — 1159F MED LIST DOCD IN RCRD: CPT | Mod: CPTII,S$GLB,, | Performed by: PHYSICIAN ASSISTANT

## 2023-03-29 PROCEDURE — 3074F SYST BP LT 130 MM HG: CPT | Mod: CPTII,S$GLB,, | Performed by: PHYSICIAN ASSISTANT

## 2023-03-29 PROCEDURE — 82607 VITAMIN B-12: CPT | Performed by: PHYSICIAN ASSISTANT

## 2023-03-29 PROCEDURE — 36415 COLL VENOUS BLD VENIPUNCTURE: CPT | Mod: PO | Performed by: PHYSICIAN ASSISTANT

## 2023-03-29 PROCEDURE — 3080F DIAST BP >= 90 MM HG: CPT | Mod: CPTII,S$GLB,, | Performed by: PHYSICIAN ASSISTANT

## 2023-03-29 PROCEDURE — 99999 PR PBB SHADOW E&M-EST. PATIENT-LVL IV: ICD-10-PCS | Mod: PBBFAC,,, | Performed by: PHYSICIAN ASSISTANT

## 2023-03-29 RX ORDER — FAMOTIDINE 20 MG/1
20 TABLET, FILM COATED ORAL 2 TIMES DAILY
COMMUNITY
Start: 2023-03-25 | End: 2023-05-10

## 2023-03-29 RX ORDER — DOCUSATE SODIUM 100 MG/1
100 CAPSULE, LIQUID FILLED ORAL 2 TIMES DAILY
COMMUNITY
Start: 2023-03-25 | End: 2023-04-04

## 2023-03-29 RX ORDER — SUCRALFATE 1 G/10ML
1 SUSPENSION ORAL 4 TIMES DAILY
COMMUNITY
Start: 2023-03-25 | End: 2023-04-04

## 2023-03-29 NOTE — PROGRESS NOTES
Assessment/Plan:    Problem List Items Addressed This Visit          GI    Gastroesophageal reflux disease without esophagitis - Primary    Overview     -chronic, recently evaluated in the ED for epigastric abdominal pain with negative workup  -remains on daily PPI + carafate which was added in the ED with improvement of symptoms  -denies alarm symptoms, such as dysphagia, weight loss or N/V  -continue lifestyle modification with avoidance of acidic foods, caffeine, late night eating  -will consider GI referral if symptoms worsen or do not improve          Other Visit Diagnoses       Renal cyst, left        Relevant Orders    US Retroperitoneal Complete    Hyperglycemia        Relevant Orders    Hemoglobin A1C    Basic Metabolic Panel    Hypokalemia        Relevant Orders    Basic Metabolic Panel    Hypomagnesemia        Relevant Orders    Magnesium    B12 deficiency        Relevant Orders    Vitamin B12          Follow up in about 3 months (around 6/29/2023), or if symptoms worsen or fail to improve, for with PCP.    Magda Aguero PA-C  _____________________________________________________________________________________________________________________________________________________    CC: ER follow up    HPI: Patient is in clinic today as an established patient here for ER follow up. Patient was evaluated at [facility] on 3/24 for epigastric abdominal pain with chest pain, shortness of breath, and dizziness/near syncope. Her cardiac workup was normal. Remaining labs were unremarkable. CT abd/pelvis was negative for acute findings. Symptoms suspected to be due to gastritis in which she was instructed to continue daily PPI and start carafate and pepcid. Today, she reports overall improvement in symptoms. She denies any current abdominal pain. Denies chest pain or shortness of breath. She is continuing to take the medication as prescribed with significant improvement of symptoms. She had some abnormal labs including  low potassium, low magnesium, and elevated glucose. She stated that she has a history of hypokalemia in which she takes daily potassium supplement, but had not been taking the medication. CT abd/pelvis also showed incidental findings of L renal cyst and small sclerotic bone lesion in the T7 vertebral body posteriorly in which nonemergent bone scan was recommended. Patient is otherwise well and denies any new complaints today.     Past Medical History:   Diagnosis Date    GERD (gastroesophageal reflux disease)     Hypokalemia      Past Surgical History:   Procedure Laterality Date    ABDOMINOPLASTY      BILATERAL TUBAL LIGATION      COLONOSCOPY N/A 4/26/2022    Procedure: COLONOSCOPY;  Surgeon: Ubaldo Gutierrez MD;  [facility];  Service: Endoscopy;  Laterality: N/A;    COSMETIC SURGERY  August 2016    Tummy tuck    TOTAL ABDOMINAL HYSTERECTOMY      TUBAL LIGATION  June 1997     Review of patient's allergies indicates:   Allergen Reactions    Sulfa (sulfonamide antibiotics) Itching     Social History     Tobacco Use    Smoking status: Never    Smokeless tobacco: Never   Substance Use Topics    Alcohol use: Not Currently    Drug use: Never     Family History   Problem Relation Age of Onset    Diabetes Mother     Heart disease Mother     Kidney disease Mother         Probably from diabetes    Diabetes Father     Heart disease Father     Cancer Father     Early death Brother         Blood clot after surgery    Colon cancer Maternal Grandmother     Alcohol abuse Maternal Grandfather     Alcohol abuse Paternal Grandmother      Current Outpatient Medications on File Prior to Visit   Medication Sig Dispense Refill    dextroamphetamine-amphetamine (ADDERALL XR) 30 MG 24 hr capsule Take 1 capsule (30 mg total) by mouth every morning. 90 capsule 0    docusate sodium (COLACE) 100 MG capsule Take 100 mg by mouth 2 (two) times daily.      famotidine (PEPCID) 20 MG tablet Take 20 mg by mouth 2 (two) times daily.       "hydroCHLOROthiazide (HYDRODIURIL) 25 MG tablet Take 1 tablet (25 mg total) by mouth once daily. 30 tablet 11    pantoprazole (PROTONIX) 40 MG tablet TAKE ONE TABLET BY MOUTH ONCE DAILY 90 tablet 2    potassium chloride (MICRO-K) 10 MEQ CpSR TAKE 3 CAPSULES BY MOUTH EVERY  capsule 2    sucralfate (CARAFATE) 100 mg/mL suspension Take 1 g by mouth 4 (four) times daily.      [DISCONTINUED] biotin/calcium carbonate (BIOTIN 100+10 ORAL) Take by mouth.       No current facility-administered medications on file prior to visit.       Review of Systems   Constitutional:  Negative for chills, diaphoresis, fatigue and fever.   HENT:  Negative for congestion, ear pain, postnasal drip, sinus pain and sore throat.    Eyes:  Negative for pain and redness.   Respiratory:  Negative for cough, chest tightness and shortness of breath.    Cardiovascular:  Negative for chest pain and leg swelling.   Gastrointestinal:  Negative for abdominal pain, constipation, diarrhea, nausea and vomiting.   Genitourinary:  Negative for dysuria and hematuria.   Musculoskeletal:  Negative for arthralgias and joint swelling.   Skin:  Negative for rash.   Neurological:  Negative for dizziness, syncope and headaches.   Psychiatric/Behavioral:  Negative for dysphoric mood. The patient is not nervous/anxious.      Vitals:    03/29/23 1523   BP: (!) 120/92   Pulse: 80   Weight: 67.7 kg (149 lb 3.2 oz)   Height: 5' 4" (1.626 m)       Wt Readings from Last 3 Encounters:   03/29/23 67.7 kg (149 lb 3.2 oz)   11/22/22 68 kg (150 lb)   10/19/22 67.3 kg (148 lb 5.9 oz)       Physical Exam  Constitutional:       General: She is not in acute distress.     Appearance: Normal appearance. She is well-developed.   HENT:      Head: Normocephalic and atraumatic.   Eyes:      Conjunctiva/sclera: Conjunctivae normal.   Cardiovascular:      Rate and Rhythm: Normal rate and regular rhythm.      Pulses: Normal pulses.      Heart sounds: Normal heart sounds. No murmur " heard.  Pulmonary:      Effort: Pulmonary effort is normal. No respiratory distress.      Breath sounds: Normal breath sounds.   Abdominal:      General: Bowel sounds are normal. There is no distension.      Palpations: Abdomen is soft.      Tenderness: There is no abdominal tenderness.   Musculoskeletal:         General: Normal range of motion.      Cervical back: Normal range of motion and neck supple.   Skin:     General: Skin is warm and dry.      Findings: No rash.   Neurological:      General: No focal deficit present.      Mental Status: She is alert and oriented to person, place, and time.   Psychiatric:         Mood and Affect: Mood normal.         Behavior: Behavior normal.       Health Maintenance   Topic Date Due    Mammogram  03/23/2023    Lipid Panel  10/21/2027    TETANUS VACCINE  02/09/2032    Hepatitis C Screening  Completed

## 2023-03-30 DIAGNOSIS — E87.6 HYPOKALEMIA: Primary | ICD-10-CM

## 2023-03-30 LAB
ESTIMATED AVG GLUCOSE: 108 MG/DL (ref 68–131)
HBA1C MFR BLD: 5.4 % (ref 4–5.6)
VIT B12 SERPL-MCNC: 653 PG/ML (ref 210–950)

## 2023-03-30 NOTE — PROGRESS NOTES
Results have been released via [employer]. Please verify that these have been viewed by patient. If not, please call patient with results.     Please schedule the following orders: BMP in 1 month    I have sent a message to them with the following interpretation (see below).    I have reviewed your recent blood work.     BMP ABNORMAL- Potassium level is low. This could be from not taking your potassium supplement. Please make sure to take it daily. We can recheck this lab in 1 month. The other electrolytes appear stable at this time.    A1C NORMAL-The A1c is at goal.  B12 NORMAL-The B12 test is currently normal.    Magnesium NORMAL - The magnesium level is normal.     Please do not hesitate to call or message with any additional questions or concerns.    Magda Aguero PA-C

## 2023-04-05 ENCOUNTER — HOSPITAL ENCOUNTER (OUTPATIENT)
Dept: RADIOLOGY | Facility: HOSPITAL | Age: 53
Discharge: HOME | End: 2023-04-05
Attending: PHYSICIAN ASSISTANT
Payer: COMMERCIAL

## 2023-04-05 ENCOUNTER — PATIENT MESSAGE (OUTPATIENT)
Dept: FAMILY MEDICINE | Facility: CLINIC | Age: 53
End: 2023-04-05
Payer: COMMERCIAL

## 2023-04-05 DIAGNOSIS — N28.1 RENAL CYST, LEFT: ICD-10-CM

## 2023-04-05 DIAGNOSIS — D17.71 ANGIOMYOLIPOMA OF LEFT KIDNEY: ICD-10-CM

## 2023-04-05 DIAGNOSIS — M89.9 LESION OF BONE OF THORACIC SPINE: ICD-10-CM

## 2023-04-05 DIAGNOSIS — N28.89 OTHER SPECIFIED DISORDERS OF KIDNEY AND URETER: Primary | ICD-10-CM

## 2023-04-05 PROCEDURE — 76770 US RETROPERITONEAL COMPLETE: ICD-10-PCS | Mod: 26,,, | Performed by: RADIOLOGY

## 2023-04-05 PROCEDURE — 76770 US EXAM ABDO BACK WALL COMP: CPT | Mod: TC,PO

## 2023-04-05 PROCEDURE — 76770 US EXAM ABDO BACK WALL COMP: CPT | Mod: 26,,, | Performed by: RADIOLOGY

## 2023-04-05 NOTE — TELEPHONE ENCOUNTER
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 A CT was recommended for further evaluation, however, since she just had a CT in the hospital, we will plan to repeat this in 6 months for surveillance per Dr. King. We will continue to monitor this.    Also, we discussed at her visit that a small sclerotic bone lesion was also seen on imaging in the T7 vertebral body in which a nonemergent bone scan was recommended. I spoke with Dr. King and we would like to go ahead and order this test for further evaluation.     Please schedule bone scan now and CT in 6 months.     I have signed for the following orders AND/OR meds.  Please call the patient and ask the patient to schedule the testing AND/OR inform about any medications that were sent. Medications have been sent to pharmacy listed below      Orders Placed This Encounter   Procedures    NM Bone Scan Whole Body     Standing Status:   Future     Standing Expiration Date:   4/5/2024     Order Specific Question:   May the Radiologist modify the order per protocol to meet the clinical needs of the patient?     Answer:   Yes    CT Urogram Abd Pelvis W WO     Standing Status:   Future     Standing Expiration Date:   4/5/2024     Order Specific Question:   Is the patient allergic to iodine contrast?     Answer:   No     Order Specific Question:   Does this patient have impaired renal function?     Answer:   No     Order Specific Question:   Diabetes?     Answer:   No     Order Specific Question:   May the Radiologist modify the order per protocol to meet the clinical needs of the patient?     Answer:   Yes              [facility]/pharmacy #[id number] - [place] - [address]  [address] [address]  Phone: [phone] Fax: [phone]    [facility] - [place], [place] - [address]  [address]  [place] [address]  Phone: [phone] Fax:  [phone]    [facility] - [place], [place] - [address] [place] [address]  Phone: [phone] Fax: [phone]

## 2023-04-06 ENCOUNTER — TELEPHONE (OUTPATIENT)
Dept: FAMILY MEDICINE | Facility: CLINIC | Age: 53
End: 2023-04-06
Payer: COMMERCIAL

## 2023-04-06 DIAGNOSIS — N28.89 RENAL MASS: Primary | ICD-10-CM

## 2023-04-06 DIAGNOSIS — D17.71 ANGIOMYOLIPOMA OF LEFT KIDNEY: ICD-10-CM

## 2023-04-06 NOTE — TELEPHONE ENCOUNTER
Spoke with tpt regarding results/recommendations. Message sent to Diana to schedule tests to be done in 6mths.

## 2023-04-06 NOTE — TELEPHONE ENCOUNTER
I made an error on the order for imaging. Please schedule CT abd/pelvis w/ and w/o contrast in 6 months. Also, schedule bone scan to be done now.     I have signed for the following orders AND/OR meds.  Please call the patient and ask the patient to schedule the testing AND/OR inform about any medications that were sent. Medications have been sent to pharmacy listed below      Orders Placed This Encounter   Procedures    CT Abdomen Pelvis W Wo Contrast     CT renal mass protocol due to left kidney lesion possibly representing an angiomyolipoma     Standing Status:   Future     Standing Expiration Date:   4/6/2024     Order Specific Question:   Is the patient allergic to iodine contrast?     Answer:   No     Order Specific Question:   Does this patient have impaired renal function?     Answer:   No     Order Specific Question:   Diabetes?     Answer:   No     Order Specific Question:   May the Radiologist modify the order per protocol to meet the clinical needs of the patient?     Answer:   Yes     Order Specific Question:   Oral/Rectal Contrast instructions:     Answer:   Routine Oral Contrast     Order Specific Question:   Special CT ABD Protocol Request?     Answer:   Routine     Comments:   CT renal mass protocol              [facility]/pharmacy #[id number] - [place] - [address] [address]  Phone: [phone] Fax: [phone]    [facility] - [place] - [address]  [address]  [facility] [address]  Phone: [phone] Fax: [phone]    [facility] - [place], [place] - [address]  [address] [address]  Phone: [phone] Fax: [phone]

## 2023-04-28 ENCOUNTER — LAB VISIT (OUTPATIENT)
Dept: LAB | Facility: HOSPITAL | Age: 53
End: 2023-04-28
Attending: PHYSICIAN ASSISTANT
Payer: COMMERCIAL

## 2023-04-28 DIAGNOSIS — E87.6 HYPOKALEMIA: ICD-10-CM

## 2023-04-28 LAB
ANION GAP SERPL CALC-SCNC: 12 MMOL/L (ref 8–16)
BUN SERPL-MCNC: 8 MG/DL (ref 6–20)
CALCIUM SERPL-MCNC: 9.9 MG/DL (ref 8.7–10.5)
CHLORIDE SERPL-SCNC: 100 MMOL/L (ref 95–110)
CO2 SERPL-SCNC: 29 MMOL/L (ref 23–29)
CREAT SERPL-MCNC: 0.8 MG/DL (ref 0.5–1.4)
EST. GFR  (NO RACE VARIABLE): >60 ML/MIN/1.73 M^2
GLUCOSE SERPL-MCNC: 94 MG/DL (ref 70–110)
POTASSIUM SERPL-SCNC: 4.2 MMOL/L (ref 3.5–5.1)
SODIUM SERPL-SCNC: 141 MMOL/L (ref 136–145)

## 2023-04-28 PROCEDURE — 80048 BASIC METABOLIC PNL TOTAL CA: CPT | Performed by: PHYSICIAN ASSISTANT

## 2023-04-28 PROCEDURE — 36415 COLL VENOUS BLD VENIPUNCTURE: CPT | Mod: PO | Performed by: PHYSICIAN ASSISTANT

## 2023-05-01 NOTE — PROGRESS NOTES
Results have been released via [facility]. Please verify that these have been viewed by patient. If not, please call patient with results.     I have sent a message to them with the following interpretation (see below).    I have reviewed your recent blood work.     BMP NORMAL-The electrolytes all appear stable at this time. This includes kidney functions along with routine electrolytes like sugar, potassium and sodium.    Please do not hesitate to call or message with any additional questions or concerns.    Magda Aguero PA-C

## 2023-05-10 ENCOUNTER — OFFICE VISIT (OUTPATIENT)
Dept: PSYCHIATRY | Facility: CLINIC | Age: 53
End: 2023-05-10
Payer: COMMERCIAL

## 2023-05-10 DIAGNOSIS — F90.0 ADHD (ATTENTION DEFICIT HYPERACTIVITY DISORDER), INATTENTIVE TYPE: ICD-10-CM

## 2023-05-10 PROCEDURE — 3044F HG A1C LEVEL LT 7.0%: CPT | Mod: CPTII,95,,

## 2023-05-10 PROCEDURE — 99214 OFFICE O/P EST MOD 30 MIN: CPT | Mod: 95,,,

## 2023-05-10 PROCEDURE — 3044F PR MOST RECENT HEMOGLOBIN A1C LEVEL <7.0%: ICD-10-PCS | Mod: CPTII,95,,

## 2023-05-10 PROCEDURE — 1159F PR MEDICATION LIST DOCUMENTED IN MEDICAL RECORD: ICD-10-PCS | Mod: CPTII,95,,

## 2023-05-10 PROCEDURE — 1160F PR REVIEW ALL MEDS BY PRESCRIBER/CLIN PHARMACIST DOCUMENTED: ICD-10-PCS | Mod: CPTII,95,,

## 2023-05-10 PROCEDURE — 99214 PR OFFICE/OUTPT VISIT, EST, LEVL IV, 30-39 MIN: ICD-10-PCS | Mod: 95,,,

## 2023-05-10 PROCEDURE — 1159F MED LIST DOCD IN RCRD: CPT | Mod: CPTII,95,,

## 2023-05-10 PROCEDURE — 1160F RVW MEDS BY RX/DR IN RCRD: CPT | Mod: CPTII,95,,

## 2023-05-10 RX ORDER — DEXTROAMPHETAMINE SACCHARATE, AMPHETAMINE ASPARTATE MONOHYDRATE, DEXTROAMPHETAMINE SULFATE AND AMPHETAMINE SULFATE 7.5; 7.5; 7.5; 7.5 MG/1; MG/1; MG/1; MG/1
30 CAPSULE, EXTENDED RELEASE ORAL EVERY MORNING
Qty: 90 CAPSULE | Refills: 0 | Status: SHIPPED | OUTPATIENT
Start: 2023-05-10 | End: 2023-08-09 | Stop reason: SDUPTHER

## 2023-05-10 NOTE — PROGRESS NOTES
"OUTPATIENT PSYCHIATRY FOLLOW UP VISIT    Encounter Date: 5/10/2023    Clinical Status of Patient:  Outpatient (Virtual)  The patient location is: [facility] [place] [address]  The patient phone number is: [phone]   Visit type: Virtual visit with synchronous audio and video  Each patient to whom he or she provides medical services by telemedicine is:  (1) informed of the relationship between the practitioner and patient and the respective role of any other health care provider with respect to management of the patient; and (2) notified that he or she may decline to receive medical services by telemedicine and may withdraw from such care at any time.    Chief Complaint:  Anna Marie Lindo is a 52 y.o. female who presents today for follow-up.  Met with patient.      HISTORY OF PRESENTING ILLNESS:  Anna Marie Lindo is a 52 y.o. female with history of Continue Adderall XR 30 mg p.o. q.a.m. for symptoms of attention and concentration deficit who presents for follow up appointment.      Plan at last appointment on 2/14/2023:  Continue Adderall XR 30 mg p.o. q.a.m. for symptoms of attention and concentration deficit    Psychotropic medication history:   Alprazolam    INITIAL HPI:  Pt presents today with symptoms of lifelong inattention and concentration deficit causing impairment in her family life as well as her career. She notes her 3 daughters have all been diagnosed and treated for ADHD, however the Pt has never sought treatment.  She notes she is easily distracted and frequently becomes sidetracked both at work and at home. She states multiple coworkers have suggested she be tested for ADHD. Pt reports as a child in school she had to study more than her peers and notes she had to repeat material many times, writing it, stating it out loud, and then repeating it again and again. She notes "When people speak to me it takes a minute to process." She also reports, "Sometimes I can't get my mouth to go as " "quickly as my thoughts are going." See ADHD screening below.     Pt describes mood as "Good." She notes a history of depression in her family and states she engages in physical activity when she begins to have feelings of sadness or depression. Pt reports she sleeps on average ~7 hours per night and denies difficulty initiating or maintaining sleep.  Pt does report some anxiety, but states this has never interfered with her life. She also notes some mild social anxiety.     ADHD Screening:  Trouble paying close attention to details, or careless mistakes: yes, "if I make mistakes its because I didn't pay attn to details"  Difficulty sustaining attention/remaining focused: frequently  Absent minded/wandeing thoughts during conversation: yes  Doesn't follow through on instructions, starts tasks but does not finish or easily distracted: frequently d/t distraction  Difficulty with organizing: occasionally   Avoids/dislikes tasks that require sustained attention: frequently  Looses important things:  Has developed routines and places for important items to go so they don't get lost  Easily distracted by extraneous stimuli: frequently  Forgetful in daily activities: frequently d/t distraction  ------  Often fidgets/squirms: occasionally when anxious  Often leaves seat at inappropriate times: no  Runs around, climbing on things or feeling restless: no  Unable to engage in leisure activities: no  Often "on the go" , motor driven: no  Often talks excessively: yes, mario alberto when nervous  Blurts out, interrupts: frequently  Can't wait turn: no  Often interrupts/intrudes: frequently    11/30/2022: Adderall continues to provide some relief of symptoms but patient reports there is room for improvement as she repeatedly finds herself unable to pay attention while at work. Pt denies cardiovascular events including increased heart rate or increased blood pressure, palpitations, chest pain, dizziness, lightheadedness, or syncopal episodes. " "Pt denies A/V hallucinations or behavioral effects. Pt denies anorexia, decreased appetite, xerostomia, headache, insomnia, or digital changes.     2/14/2023:   ADHD  Meds:  Adderall XR to 30 mg p.o. q.a.m.  Inattentive symptoms:  Controlled  Hyperactive symptoms:  Controlled  Behavior at home:  Stable  Behavior at work:  Stable, productive  Side effects:  Denies  Weight:  Stable, no appetite suppression concerns reported      Adderall XR was increased from 25 to 30 mg q.a.m. at last visit. Pt reports improvement in symptoms with this dose increase. Symptoms are controlled throughout the day and Pt states she is not experiencing insomnia.   Patient denies significant sleep concerns, or medication adverse effects.  No interval episodes with symptoms consistent with wilber or hypomania.  Denied interval or current suicidal/homicidal thoughts, intent, or plan or NSSI.  Denied other questions and concerns.  Patient reports feeling stable and wishing to continue current management unchanged.       INTERVAL HISTORY:  ADHD  Meds:  Adderall XR 30 mg p.o. q.a.m.  Inattentive symptoms:  Controlled  Hyperactive symptoms:  Controlled  Behavior at home:  Stable  Behavior at work:  Stable, productive  Side effects:  Occasional dry mouth, patient states she has increased fluid intake; "decreased stress eating"  Weight:  Stable, no appetite suppression concerns reported     Patient was seen at the [facility] on 03/24 for chest pain.  Patient states earlier that day she became "sweaty, dizzy, lightheaded, almost passed out," after taking her large dog to the vet. She states she felt better after sitting down. She then developed abdominal pain and chest pressure, it was more of a heaviness not pain." Cardiac workup was negative.    No interval episodes with symptoms consistent with wilber or hypomania.  Denied other questions and concerns.  Patient reports feeling stable and wishing to continue current management " unchanged.    Medication side effects: see above  Medication adherence: no    PSYCHIATRIC REVIEW OF SYSTEMS:  Is patient experiencing or having changes in:  Trouble with sleep:  Occasional initial insomnia with difficulty winding down in the evening  Appetite changes:  no  Weight changes:  no  Lack of energy:  no  Anhedonia:  no  Somatic symptoms:  no  Anxiety/panic:  no  Irritability: no  Guilty/hopeless:  no  Concentration: no  Racing thoughts: no  Impulsive behaviors: no  Paranoia/AVH: no  Self-injurious behavior/risky behavior:  no  Any drugs:  no  Alcohol:  no      MEDICAL REVIEW OF SYSTEMS:   Pain: Denies any significant chronic or acute pain.  Constitutional: Denies fever or change in appetite.  Cardiovascular: +chest pressure; Denies chest pain or exertional dyspnea.  Respiratory: Reji cough or orthopnea.   GI: + abdominal pain, denies N/V  Neurological: Denies tremor, seizure, or focal weakness.  Psychiatric: See HPI above.    PAST PSYCHIATRIC, MEDICAL, AND SOCIAL HISTORY REVIEWED  The patient's past medical, family and social history have been reviewed and updated as appropriate within the electronic medical record - see encounter notes.    PAST MEDICAL HISTORY:   Past Medical History:   Diagnosis Date    GERD (gastroesophageal reflux disease)     Hypokalemia     Head trauma/Loss of consciousness: denies  Seizures: denies     PAST PSYCHIATRIC HISTORY:  First psych contact:  Today, 2/23/22  Prior hospitalizations: denies  Prior suicide attempts or self-harm: denies  Prior diagnosis: denies  Prior meds: xanax   Current meds: none  Prior psychotherapy: denies    FAMILY HISTORY:   Paternal: grandmother alcoholic; no psychiatric history or history of substance abuse or suicide  Maternal: mom undiagnosed psych estranged from kids; no history of substance abuse or suicide     SOCIAL HISTORY:   Childhood: born and raised in [place]  Marital Status:   Children: 3 daughters, all ADHD  Resides:  [place]  Occupation:    Hobbies: denita  Taoist: Orthodoxy  Education level: high school graduate  : denies  Legal: denies  Access to guns: yes     SUBSTANCE HISTORY:  Caffeine: diet coke, 6 per day  Tobacco: denies  Alcohol: occasional ~2-3 drinks at a time  Drug use: Denied current use.  Denied history of abuse or dependency.  Rehab: denies  Prior/current AA: denies      MEDICATIONS:    Current Outpatient Medications:     dextroamphetamine-amphetamine (ADDERALL XR) 30 MG 24 hr capsule, Take 1 capsule (30 mg total) by mouth every morning., Disp: 90 capsule, Rfl: 0    famotidine (PEPCID) 20 MG tablet, Take 20 mg by mouth 2 (two) times daily., Disp: , Rfl:     hydroCHLOROthiazide (HYDRODIURIL) 25 MG tablet, Take 1 tablet (25 mg total) by mouth once daily., Disp: 30 tablet, Rfl: 11    pantoprazole (PROTONIX) 40 MG tablet, TAKE ONE TABLET BY MOUTH ONCE DAILY, Disp: 90 tablet, Rfl: 2    potassium chloride (MICRO-K) 10 MEQ CpSR, TAKE 3 CAPSULES BY MOUTH EVERY DAY, Disp: 270 capsule, Rfl: 2    ALLERGIES:  Review of patient's allergies indicates:   Allergen Reactions    Sulfa (sulfonamide antibiotics) Itching       EXAM:  Constitutional  Vitals:  Most recent vital signs were reviewed.   Last 3 sets of VS:  Vitals - 1 value per visit 11/22/2022 3/29/2023 3/29/2023   SYSTOLIC 121 - 120   DIASTOLIC 85 - 92   Pulse 90 - 80   Temp 97 - -   Resp - - -   SPO2 98 - -   Weight (lb) 150 - 149.2   Weight (kg) 68.04 - 67.677   Height 63.5 - 64   BMI (Calculated) 26.2 - 25.6   VISIT REPORT - - -   Pain Score  - 0 -      General:  unremarkable, age appropriate     Musculoskeletal  Muscle Strength/Tone:  No tremor or abnormal movements   Gait & Station:  Steady, non-ataxic     Psychiatric  Speech:  no latency; no press   Mood & Affect:  euthymic  congruent and appropriate   Thought Process:  normal and logical   Associations:  intact   Thought Content:  normal, no suicidality, no homicidality,  delusions, or paranoia   Insight:  intact   Judgement: behavior is adequate to circumstances   Orientation:  grossly intact   Memory: intact for content of interview   Language: grossly intact   Attention Span & Concentration:  able to focus   Fund of Knowledge:  intact and appropriate to age and level of education     SUICIDE RISK ASSESSMENT:  Protective factors: age, gender, no prior attempts, no prior hospitalizations, no ongoing substance abuse, no psychosis, , has children, denies SI/intent/plan, seeking treatment, access to treatment, future oriented, good primary support, no access to firearms  Risks: ongoing symptoms of attention and concentration deficit  Patient is a low immediate and long-term risk considering risk factors.     RELEVANT LABS/STUDIES:    Lab Results   Component Value Date    WBC 6.44 10/21/2022    HGB 14.2 10/21/2022    HCT 41.9 10/21/2022    MCV 90 10/21/2022     10/21/2022     BMP  Lab Results   Component Value Date     04/28/2023    K 4.2 04/28/2023     04/28/2023    CO2 29 04/28/2023    BUN 8 04/28/2023    CREATININE 0.8 04/28/2023    CALCIUM 9.9 04/28/2023    ANIONGAP 12 04/28/2023    ESTGFRAFRICA >60.0 03/30/2022    EGFRNONAA >60.0 03/30/2022     Lab Results   Component Value Date    ALT 25 10/21/2022    AST 20 10/21/2022    ALKPHOS 83 10/21/2022    BILITOT 0.4 10/21/2022     Lab Results   Component Value Date    TSH 1.108 10/21/2022     Lab Results   Component Value Date    HGBA1C 5.4 03/29/2023       IMPRESSION:    Anna Marie Lindo is a 52 y.o. female with history of ADHD who presents for follow up appointment.    Status/Progress: Based on the examination today, the patient's problem(s) is/are well controlled.  New problems have not been presented today.   Co-morbidities are not complicating management of the primary condition.  There are no active rule-out diagnoses for this patient at this time.     Risk Parameters:  Patient reports no suicidal  ideation  Patient reports no homicidal ideation  Patient reports no self-injurious behavior  Patient reports no violent behavior    DIAGNOSES:  No diagnosis found.    PLAN:  Continue Adderall XR 30 mg p.o. q.a.m. for symptoms of attention and concentration deficit  Offered referral for individual psychotherapy.    RETURN TO CLINIC:   3 months      VLADIMIR Bradford, PMHNP-BC      35 minutes of total time spent on the encounter, which includes face to face time and non-face to face time preparing to see the patient (eg. review of tests), obtaining and/or reviewing separately obtained history, documenting clinical information in the electronic health record, independently interpreting results (not separately reported), and communicating results to the patient/family/caregiver, or care coordination (not separately reported).       At this time there are no indications the patient represents an imminent danger to either themselves or others; will continue to manage treatment in the outpatient setting.    I discussed the patient's care with the patient including benefits, alternatives, possible adverse effects of the treatment plan; including the potential for metabolic complications, major organ dysfunction, black box warnings, and contraindications. The opportunity was given for questions/clarification, and after this discussion the above treatment plan was devised through shared decision making. The patient voiced their understanding of the diagnoses and treatments listed above and agreed to the treatment plan. Follow up plan was reviewed with the patient. The patient was advised to call to report any worsening of symptoms or problems with medication.    Supportive therapy and psychoeducation provided. Patient has been given crisis information including Suicide and Crisis Lifeline (call or text: 081). Patient also given instructions to go to the nearest ER or call 911 if unable to remain safe or if the Pt develops  "thoughts of harming self or others.    [facility]: Reviewed today to detect potential controlled substance misuse, diversion, excessive prescribing, or multiple providers prescribing controlled substances. The patients report was deemed appropriate without new medications of concern prescribed by other providers.    Documentation entered by me for this encounter may have been done in part using [employer] voice recognition transcription software. Garbled syntax, mangled pronouns, and other bizarre constructions may be attributed to that software system. Although I have made an effort to ensure accuracy, "sound like" errors may exist and should be interpreted in context.  "

## 2023-08-09 ENCOUNTER — OFFICE VISIT (OUTPATIENT)
Dept: PSYCHIATRY | Facility: CLINIC | Age: 53
End: 2023-08-09
Payer: COMMERCIAL

## 2023-08-09 VITALS
SYSTOLIC BLOOD PRESSURE: 115 MMHG | HEIGHT: 64 IN | HEART RATE: 77 BPM | WEIGHT: 151.25 LBS | DIASTOLIC BLOOD PRESSURE: 79 MMHG | BODY MASS INDEX: 25.82 KG/M2

## 2023-08-09 DIAGNOSIS — F90.0 ADHD (ATTENTION DEFICIT HYPERACTIVITY DISORDER), INATTENTIVE TYPE: Primary | ICD-10-CM

## 2023-08-09 PROCEDURE — 3008F BODY MASS INDEX DOCD: CPT | Mod: CPTII,S$GLB,,

## 2023-08-09 PROCEDURE — 3074F PR MOST RECENT SYSTOLIC BLOOD PRESSURE < 130 MM HG: ICD-10-PCS | Mod: CPTII,S$GLB,,

## 2023-08-09 PROCEDURE — 3044F PR MOST RECENT HEMOGLOBIN A1C LEVEL <7.0%: ICD-10-PCS | Mod: CPTII,S$GLB,,

## 2023-08-09 PROCEDURE — 3074F SYST BP LT 130 MM HG: CPT | Mod: CPTII,S$GLB,,

## 2023-08-09 PROCEDURE — 3078F PR MOST RECENT DIASTOLIC BLOOD PRESSURE < 80 MM HG: ICD-10-PCS | Mod: CPTII,S$GLB,,

## 2023-08-09 PROCEDURE — 3078F DIAST BP <80 MM HG: CPT | Mod: CPTII,S$GLB,,

## 2023-08-09 PROCEDURE — 1160F PR REVIEW ALL MEDS BY PRESCRIBER/CLIN PHARMACIST DOCUMENTED: ICD-10-PCS | Mod: CPTII,S$GLB,,

## 2023-08-09 PROCEDURE — 99214 PR OFFICE/OUTPT VISIT, EST, LEVL IV, 30-39 MIN: ICD-10-PCS | Mod: S$GLB,,,

## 2023-08-09 PROCEDURE — 1159F PR MEDICATION LIST DOCUMENTED IN MEDICAL RECORD: ICD-10-PCS | Mod: CPTII,S$GLB,,

## 2023-08-09 PROCEDURE — 99999 PR PBB SHADOW E&M-EST. PATIENT-LVL III: ICD-10-PCS | Mod: PBBFAC,,,

## 2023-08-09 PROCEDURE — 3008F PR BODY MASS INDEX (BMI) DOCUMENTED: ICD-10-PCS | Mod: CPTII,S$GLB,,

## 2023-08-09 PROCEDURE — 3044F HG A1C LEVEL LT 7.0%: CPT | Mod: CPTII,S$GLB,,

## 2023-08-09 PROCEDURE — 1159F MED LIST DOCD IN RCRD: CPT | Mod: CPTII,S$GLB,,

## 2023-08-09 PROCEDURE — 99999 PR PBB SHADOW E&M-EST. PATIENT-LVL III: CPT | Mod: PBBFAC,,,

## 2023-08-09 PROCEDURE — 1160F RVW MEDS BY RX/DR IN RCRD: CPT | Mod: CPTII,S$GLB,,

## 2023-08-09 PROCEDURE — 99214 OFFICE O/P EST MOD 30 MIN: CPT | Mod: S$GLB,,,

## 2023-08-09 RX ORDER — DEXTROAMPHETAMINE SACCHARATE, AMPHETAMINE ASPARTATE MONOHYDRATE, DEXTROAMPHETAMINE SULFATE AND AMPHETAMINE SULFATE 7.5; 7.5; 7.5; 7.5 MG/1; MG/1; MG/1; MG/1
30 CAPSULE, EXTENDED RELEASE ORAL EVERY MORNING
Qty: 90 CAPSULE | Refills: 0 | Status: SHIPPED | OUTPATIENT
Start: 2023-08-09 | End: 2023-08-16

## 2023-08-09 NOTE — PROGRESS NOTES
"OUTPATIENT PSYCHIATRY FOLLOW UP VISIT    Encounter Date: 8/9/2023    Clinical Status of Patient:  Outpatient (Virtual)  The patient location is: [address] [place], [place] [address] [id number]  The patient phone number is: [phone]   Visit type: Virtual visit with synchronous audio and video  Each patient to whom he or she provides medical services by telemedicine is:  (1) informed of the relationship between the practitioner and patient and the respective role of any other health care provider with respect to management of the patient; and (2) notified that he or she may decline to receive medical services by telemedicine and may withdraw from such care at any time.    Chief Complaint:  Anna Marie Lindo is a 52 y.o. female who presents today for follow-up.  Met with patient.      HISTORY OF PRESENTING ILLNESS:  Anna Marie Lindo is a 52 y.o. female with history of Continue Adderall XR 30 mg p.o. q.a.m. for symptoms of attention and concentration deficit who presents for follow up appointment.      INITIAL HPI:  Pt presents today with symptoms of lifelong inattention and concentration deficit causing impairment in her family life as well as her career. She notes her 3 daughters have all been diagnosed and treated for ADHD, however the Pt has never sought treatment.  She notes she is easily distracted and frequently becomes sidetracked both at work and at home. She states multiple coworkers have suggested she be tested for ADHD. Pt reports as a child in school she had to study more than her peers and notes she had to repeat material many times, writing it, stating it out loud, and then repeating it again and again. She notes "When people speak to me it takes a minute to process." She also reports, "Sometimes I can't get my mouth to go as quickly as my thoughts are going." See ADHD screening below.     Pt describes mood as "Good." She notes a history of depression in her family and states she engages in physical activity " "when she begins to have feelings of sadness or depression. Pt reports she sleeps on average ~7 hours per night and denies difficulty initiating or maintaining sleep.  Pt does report some anxiety, but states this has never interfered with her life. She also notes some mild social anxiety.     ADHD Screening:  Trouble paying close attention to details, or careless mistakes: yes, "if I make mistakes its because I didn't pay attn to details"  Difficulty sustaining attention/remaining focused: frequently  Absent minded/wandeing thoughts during conversation: yes  Doesn't follow through on instructions, starts tasks but does not finish or easily distracted: frequently d/t distraction  Difficulty with organizing: occasionally   Avoids/dislikes tasks that require sustained attention: frequently  Looses important things:  Has developed routines and places for important items to go so they don't get lost  Easily distracted by extraneous stimuli: frequently  Forgetful in daily activities: frequently d/t distraction  ------  Often fidgets/squirms: occasionally when anxious  Often leaves seat at inappropriate times: no  Runs around, climbing on things or feeling restless: no  Unable to engage in leisure activities: no  Often "on the go" , motor driven: no  Often talks excessively: yes, mario alberto when nervous  Blurts out, interrupts: frequently  Can't wait turn: no  Often interrupts/intrudes: frequently    11/30/2022: Adderall continues to provide some relief of symptoms but patient reports there is room for improvement as she repeatedly finds herself unable to pay attention while at work. Pt denies cardiovascular events including increased heart rate or increased blood pressure, palpitations, chest pain, dizziness, lightheadedness, or syncopal episodes. Pt denies A/V hallucinations or behavioral effects. Pt denies anorexia, decreased appetite, xerostomia, headache, insomnia, or digital changes.     2/14/2023:   ADHD  Meds:  Adderall XR " "to 30 mg p.o. q.a.m.  Inattentive symptoms:  Controlled  Hyperactive symptoms:  Controlled  Behavior at home:  Stable  Behavior at work:  Stable, productive  Side effects:  Denies  Weight:  Stable, no appetite suppression concerns reported      Adderall XR was increased from 25 to 30 mg q.a.m. at last visit. Pt reports improvement in symptoms with this dose increase. Symptoms are controlled throughout the day and Pt states she is not experiencing insomnia.   Patient denies significant sleep concerns, or medication adverse effects.  No interval episodes with symptoms consistent with wilber or hypomania.  Denied interval or current suicidal/homicidal thoughts, intent, or plan or NSSI.  Denied other questions and concerns.  Patient reports feeling stable and wishing to continue current management unchanged.       Plan at last appointment on 2/14/2023:  Continue Adderall XR 30 mg p.o. q.a.m. for symptoms of attention and concentration deficit  Offered referral for individual psychotherapy.    Psychotropic medication history:   Alprazolam    INTERVAL HISTORY:    ADHD  Meds:  Adderall XR 30 mg p.o. q.a.m.  Inattentive symptoms:  Controlled  Hyperactive symptoms:  Controlled  Behavior at home:  Stable  Behavior at work:  Stable, productive  Side effects:  Occasional dry mouth, patient states she has increased fluid intake; "decreased stress eating"  Weight:  Stable, no appetite suppression concerns reported     Patient was seen at the [facility] on 03/24 for chest pain.  Patient states earlier that day she became "sweaty, dizzy, lightheaded, almost passed out," after taking her large dog to the vet. She states she felt better after sitting down. She then developed abdominal pain and chest pressure, it was more of a heaviness not pain." Cardiac workup was negative.    Controlling symptoms     Dry mouth has improved, after d/c diet coke x 3 weeks.  Was having at least 6 diet cokes per day.       No " interval episodes with symptoms consistent with wilber or hypomania.  Denied other questions and concerns.  Patient reports feeling stable and wishing to continue current management unchanged.    Medication side effects: see above  Medication adherence: no    PSYCHIATRIC REVIEW OF SYSTEMS:  Is patient experiencing or having changes in:  Trouble with sleep:  Continued occasional initial insomnia with difficulty winding down in the evening  Appetite changes:  no  Weight changes:  no  Lack of energy:  no  Anhedonia:  no  Somatic symptoms:  no  Anxiety/panic:  no  Irritability: no  Guilty/hopeless:  no  Concentration: difficulty at baseline with hx ADHD, improved with Adderall  Racing thoughts: no  Impulsive behaviors: no  Paranoia/AVH: no  Self-injurious behavior/risky behavior:  no  Any drugs:  no  Alcohol:  no      MEDICAL REVIEW OF SYSTEMS:   Pain: Denies any significant chronic or acute pain.  Constitutional: Denies fever or change in appetite.  Cardiovascular: +chest pressure; Denies chest pain or exertional dyspnea.  Respiratory: Reji cough or orthopnea.   GI: + abdominal pain, denies N/V  Neurological: Denies tremor, seizure, or focal weakness.  Psychiatric: See HPI above.    PAST PSYCHIATRIC, MEDICAL, AND SOCIAL HISTORY REVIEWED  The patient's past medical, family and social history have been reviewed and updated as appropriate within the electronic medical record - see encounter notes.    PAST MEDICAL HISTORY:   Past Medical History:   Diagnosis Date    GERD (gastroesophageal reflux disease)     Hypokalemia     Head trauma/Loss of consciousness: denies  Seizures: denies     PAST PSYCHIATRIC HISTORY:  First psych contact:  Today, 2/23/22  Prior hospitalizations: denies  Prior suicide attempts or self-harm: denies  Prior diagnosis: denies  Prior meds: xanax   Current meds: none  Prior psychotherapy: denies    FAMILY HISTORY:   Paternal: grandmother alcoholic; no psychiatric history or history of substance abuse  or suicide  Maternal: mom undiagnosed psych estranged from kids; no history of substance abuse or suicide     SOCIAL HISTORY:   Childhood: born and raised in [place]  Marital Status:   Children: 3 daughters, all ADHD  Resides: [place]  Occupation:    Hobbies: denita  Cong[employer]ational: [employer]  Education level: high school graduate  : denies  Legal: denies  Access to guns: yes     SUBSTANCE HISTORY:  Caffeine: diet coke, 6 per day  Tobacco: denies  Alcohol: occasional ~2-3 drinks at a time  Drug use: Denied current use.  Denied history of abuse or dependency.  Rehab: denies  Prior/current AA: denies      MEDICATIONS:    Current Outpatient Medications:     hydroCHLOROthiazide (HYDRODIURIL) 25 MG tablet, Take 1 tablet (25 mg total) by mouth once daily., Disp: 30 tablet, Rfl: 11    pantoprazole (PROTONIX) 40 MG tablet, TAKE ONE TABLET BY MOUTH ONCE DAILY, Disp: 90 tablet, Rfl: 2    potassium chloride (MICRO-K) 10 MEQ CpSR, TAKE 3 CAPSULES BY MOUTH EVERY DAY, Disp: 270 capsule, Rfl: 2    dextroamphetamine-amphetamine (ADDERALL XR) 30 MG 24 hr capsule, Take 1 capsule (30 mg total) by mouth every morning., Disp: 90 capsule, Rfl: 0    ALLERGIES:  Review of patient's allergies indicates:   Allergen Reactions    Sulfa (sulfonamide antibiotics) Itching       EXAM:  Constitutional  Vitals:  Most recent vital signs were reviewed.   Last 3 sets of VS:  Vitals - 1 value per visit 3/29/2023 8/9/2023 8/9/2023   SYSTOLIC 120 - 115   DIASTOLIC 92 - 79   Pulse 80 - 77   Temp - - -   Resp - - -   SPO2 - - -   Weight (lb) 149.2 - 151.24   Weight (kg) 67.677 - 68.6   Height 64 - 64   BMI (Calculated) 25.6 - 25.9   VISIT REPORT [id number]  [id number]; [id number]  [id number]; [id number]  [id number];   Pain Score  - 0 -      General:  unremarkable, age appropriate     Musculoskeletal  Muscle Strength/Tone:  No tremor or abnormal movements   Gait &  Station:  Steady, non-ataxic     Psychiatric  Speech:  no latency; no press   Mood & Affect:  euthymic  congruent and appropriate   Thought Process:  normal and logical   Associations:  intact   Thought Content:  normal, no suicidality, no homicidality, delusions, or paranoia   Insight:  intact   Judgement: behavior is adequate to circumstances   Orientation:  grossly intact   Memory: intact for content of interview   Language: grossly intact   Attention Span & Concentration:  able to focus   Fund of Knowledge:  intact and appropriate to age and level of education     SUICIDE RISK ASSESSMENT:  Protective factors: age, gender, no prior attempts, no prior hospitalizations, no ongoing substance abuse, no psychosis, , has children, denies SI/intent/plan, seeking treatment, access to treatment, future oriented, good primary support, no access to firearms  Risks: ongoing symptoms of attention and concentration deficit  Patient is a low immediate and long-term risk considering risk factors.     RELEVANT LABS/STUDIES:    Lab Results   Component Value Date    WBC 6.44 10/21/2022    HGB 14.2 10/21/2022    HCT 41.9 10/21/2022    MCV 90 10/21/2022     10/21/2022     BMP  Lab Results   Component Value Date     04/28/2023    K 4.2 04/28/2023     04/28/2023    CO2 29 04/28/2023    BUN 8 04/28/2023    CREATININE 0.8 04/28/2023    CALCIUM 9.9 04/28/2023    ANIONGAP 12 04/28/2023    ESTGFRAFRICA >60.0 03/30/2022    EGFRNONAA >60.0 03/30/2022     Lab Results   Component Value Date    ALT 25 10/21/2022    AST 20 10/21/2022    ALKPHOS 83 10/21/2022    BILITOT 0.4 10/21/2022     Lab Results   Component Value Date    TSH 1.108 10/21/2022     Lab Results   Component Value Date    HGBA1C 5.4 03/29/2023       IMPRESSION:    Anna Marie Lindo is a 52 y.o. female with history of ADHD who presents for follow up appointment.    Status/Progress: Based on the examination today, the patient's problem(s) is/are well  controlled.  New problems have not been presented today.   Co-morbidities are not complicating management of the primary condition.  There are no active rule-out diagnoses for this patient at this time.     Risk Parameters:  Patient reports no suicidal ideation  Patient reports no homicidal ideation  Patient reports no self-injurious behavior  Patient reports no violent behavior    DIAGNOSES:  No diagnosis found.    PLAN:  Continue Adderall XR 30 mg p.o. q.a.m. for symptoms of attention and concentration deficit  Offered referral for individual psychotherapy.    RETURN TO CLINIC:   3 months      VLADIMIR Bradford, PMHNP-BC      35 minutes of total time spent on the encounter, which includes face to face time and non-face to face time preparing to see the patient (eg. review of tests), obtaining and/or reviewing separately obtained history, documenting clinical information in the electronic health record, independently interpreting results (not separately reported), and communicating results to the patient/family/caregiver, or care coordination (not separately reported).       At this time there are no indications the patient represents an imminent danger to either themselves or others; will continue to manage treatment in the outpatient setting.    I discussed the patient's care with the patient including benefits, alternatives, possible adverse effects of the treatment plan; including the potential for metabolic complications, major organ dysfunction, black box warnings, and contraindications. The opportunity was given for questions/clarification, and after this discussion the above treatment plan was devised through shared decision making. The patient voiced their understanding of the diagnoses and treatments listed above and agreed to the treatment plan. Follow up plan was reviewed with the patient. The patient was advised to call to report any worsening of symptoms or problems with medication.    Supportive  "therapy and psychoeducation provided. Patient has been given crisis information including Suicide and Crisis Lifeline (call or text: 827). Patient also given instructions to go to the nearest ER or call 911 if unable to remain safe or if the Pt develops thoughts of harming self or others.    [facility]: Reviewed today to detect potential controlled substance misuse, diversion, excessive prescribing, or multiple providers prescribing controlled substances. The patients report was deemed appropriate without new medications of concern prescribed by other providers.    Documentation entered by me for this encounter may have been done in part using [employer] voice recognition transcription software. Garbled syntax, mangled pronouns, and other bizarre constructions may be attributed to that software system. Although I have made an effort to ensure accuracy, "sound like" errors may exist and should be interpreted in context.    "

## 2023-08-09 NOTE — PROGRESS NOTES
"OUTPATIENT PSYCHIATRY FOLLOW UP VISIT    Encounter Date: 8/9/2023    Clinical Status of Patient:  Outpatient (Ambulatory)    Chief Complaint:  Anna Marie Lindo is a 52 y.o. female who presents today for follow-up.  Met with patient.      HISTORY OF PRESENTING ILLNESS:  Anna Marie Lindo is a 52 y.o. female with history of Continue Adderall XR 30 mg p.o. q.a.m. for symptoms of attention and concentration deficit who presents for follow up appointment.      INITIAL HPI:  Pt presents today with symptoms of lifelong inattention and concentration deficit causing impairment in her family life as well as her career. She notes her 3 daughters have all been diagnosed and treated for ADHD, however the Pt has never sought treatment.  She notes she is easily distracted and frequently becomes sidetracked both at work and at home. She states multiple coworkers have suggested she be tested for ADHD. Pt reports as a child in school she had to study more than her peers and notes she had to repeat material many times, writing it, stating it out loud, and then repeating it again and again. She notes "When people speak to me it takes a minute to process." She also reports, "Sometimes I can't get my mouth to go as quickly as my thoughts are going." See ADHD screening below.     Pt describes mood as "Good." She notes a history of depression in her family and states she engages in physical activity when she begins to have feelings of sadness or depression. Pt reports she sleeps on average ~7 hours per night and denies difficulty initiating or maintaining sleep.  Pt does report some anxiety, but states this has never interfered with her life. She also notes some mild social anxiety.     ADHD Screening:  Trouble paying close attention to details, or careless mistakes: yes, "if I make mistakes its because I didn't pay attn to details"  Difficulty sustaining attention/remaining focused: frequently  Absent minded/wandeing thoughts during " "conversation: yes  Doesn't follow through on instructions, starts tasks but does not finish or easily distracted: frequently d/t distraction  Difficulty with organizing: occasionally   Avoids/dislikes tasks that require sustained attention: frequently  Looses important things:  Has developed routines and places for important items to go so they don't get lost  Easily distracted by extraneous stimuli: frequently  Forgetful in daily activities: frequently d/t distraction  ------  Often fidgets/squirms: occasionally when anxious  Often leaves seat at inappropriate times: no  Runs around, climbing on things or feeling restless: no  Unable to engage in leisure activities: no  Often "on the go" , motor driven: no  Often talks excessively: yes, mario alberto when nervous  Blurts out, interrupts: frequently  Can't wait turn: no  Often interrupts/intrudes: frequently       Plan at last appointment on 5/10/2023:   Continue Adderall XR 30 mg p.o. q.a.m. for symptoms of attention and concentration deficit  Offered referral for individual psychotherapy.    Psychotropic medication history:   Alprazolam      INTERVAL HISTORY:    ADHD  Meds:  Adderall XR 30 mg p.o. q.a.m.  Inattentive symptoms:  Controlled  Hyperactive symptoms:  Controlled  Behavior at home:  Stable  Behavior at work:  Stable, productive  Side effects:  Dry mouth has improved after d/c diet coke 3 weeks ago.  Was having at least 6 diet cokes per day.   Weight:  Stable, no appetite suppression concerns reported     Patient was seen at the [facility] on 03/24 for chest pain with negative cardiac workup. She denies CP or cardiac symptoms since that time.    No interval episodes with symptoms consistent with wilber or hypomania.  Denied other questions and concerns.  Patient reports feeling stable and wishing to continue current management unchanged.    Medication side effects: see above  Medication adherence: no    PSYCHIATRIC REVIEW OF SYSTEMS:  Is patient " experiencing or having changes in:  Trouble with sleep:  Continued occasional initial insomnia with difficulty winding down in the evening  Appetite changes:  no  Weight changes:  no  Lack of energy:  no  Anhedonia:  no  Somatic symptoms:  no  Anxiety/panic:  no  Irritability: no  Guilty/hopeless:  no  Concentration: difficulty at baseline with hx ADHD, improved with Adderall  Racing thoughts: no  Impulsive behaviors: no  Paranoia/AVH: no  Self-injurious behavior/risky behavior:  no  Any drugs:  no  Alcohol:  no      MEDICAL REVIEW OF SYSTEMS:   Pain: Denies any significant chronic or acute pain.  Constitutional: Denies fever or change in appetite.  Cardiovascular: +chest pressure; Denies chest pain or exertional dyspnea.  Respiratory: Reji cough or orthopnea.   GI: + abdominal pain, denies N/V  Neurological: Denies tremor, seizure, or focal weakness.  Psychiatric: See HPI above.    PAST PSYCHIATRIC, MEDICAL, AND SOCIAL HISTORY REVIEWED  The patient's past medical, family and social history have been reviewed and updated as appropriate within the electronic medical record - see encounter notes.    PAST MEDICAL HISTORY:   Past Medical History:   Diagnosis Date    GERD (gastroesophageal reflux disease)     Hypokalemia     Head trauma/Loss of consciousness: denies  Seizures: denies     PAST PSYCHIATRIC HISTORY:  First psych contact:  Today, 2/23/22  Prior hospitalizations: denies  Prior suicide attempts or self-harm: denies  Prior diagnosis: denies  Prior meds: xanax   Current meds: none  Prior psychotherapy: denies    FAMILY HISTORY:   Paternal: grandmother alcoholic; no psychiatric history or history of substance abuse or suicide  Maternal: mom undiagnosed psych estranged from kids; no history of substance abuse or suicide     SOCIAL HISTORY:   Childhood: born and raised in [place]  Marital Status:   Children: 3 daughters, all ADHD  Resides: [place]  Occupation:    Hobbies:  denita  Muslim: [employer]  Education level: high school graduate  : denies  Legal: denies  Access to guns: yes     SUBSTANCE HISTORY:  Caffeine: diet coke, 6 per day  Tobacco: denies  Alcohol: occasional ~2-3 drinks at a time  Drug use: Denied current use.  Denied history of abuse or dependency.  Rehab: denies  Prior/current AA: denies      MEDICATIONS:    Current Outpatient Medications:     hydroCHLOROthiazide (HYDRODIURIL) 25 MG tablet, Take 1 tablet (25 mg total) by mouth once daily., Disp: 30 tablet, Rfl: 11    pantoprazole (PROTONIX) 40 MG tablet, TAKE ONE TABLET BY MOUTH ONCE DAILY, Disp: 90 tablet, Rfl: 2    potassium chloride (MICRO-K) 10 MEQ CpSR, TAKE 3 CAPSULES BY MOUTH EVERY DAY, Disp: 270 capsule, Rfl: 2    dextroamphetamine-amphetamine (ADDERALL XR) 30 MG 24 hr capsule, Take 1 capsule (30 mg total) by mouth every morning., Disp: 90 capsule, Rfl: 0    ALLERGIES:  Review of patient's allergies indicates:   Allergen Reactions    Sulfa (sulfonamide antibiotics) Itching       EXAM:  Constitutional  Vitals:  Most recent vital signs were reviewed.   Last 3 sets of VS:  Vitals - 1 value per visit 3/29/2023 8/9/2023 8/9/2023   SYSTOLIC 120 - 115   DIASTOLIC 92 - 79   Pulse 80 - 77   Temp - - -   Resp - - -   SPO2 - - -   Weight (lb) 149.2 - 151.24   Weight (kg) 67.677 - 68.6   Height 64 - 64   BMI (Calculated) 25.6 - 25.9   VISIT REPORT [id number]  [id number]; 17NONCRENCREPNotFromCR  [id number]; 17NONCRENCREPNotFromCR  [id number];   Pain Score  - 0 -      General:  unremarkable, age appropriate     Musculoskeletal  Muscle Strength/Tone:  No tremor or abnormal movements   Gait & Station:  Steady, non-ataxic     Psychiatric  Speech:  no latency; no press   Mood & Affect:  euthymic  congruent and appropriate   Thought Process:  normal and logical   Associations:  intact   Thought Content:  normal, no suicidality, no homicidality, delusions, or paranoia   Insight:  intact    Judgement: behavior is adequate to circumstances   Orientation:  grossly intact   Memory: intact for content of interview   Language: grossly intact   Attention Span & Concentration:  able to focus   Fund of Knowledge:  intact and appropriate to age and level of education     SUICIDE RISK ASSESSMENT:  Protective factors: age, gender, no prior attempts, no prior hospitalizations, no ongoing substance abuse, no psychosis, , has children, denies SI/intent/plan, seeking treatment, access to treatment, future oriented, good primary support, no access to firearms  Risks: ongoing symptoms of attention and concentration deficit  Patient is a low immediate and long-term risk considering risk factors.     RELEVANT LABS/STUDIES:    Lab Results   Component Value Date    WBC 6.44 10/21/2022    HGB 14.2 10/21/2022    HCT 41.9 10/21/2022    MCV 90 10/21/2022     10/21/2022     BMP  Lab Results   Component Value Date     04/28/2023    K 4.2 04/28/2023     04/28/2023    CO2 29 04/28/2023    BUN 8 04/28/2023    CREATININE 0.8 04/28/2023    CALCIUM 9.9 04/28/2023    ANIONGAP 12 04/28/2023    ESTGFRAFRICA >60.0 03/30/2022    EGFRNONAA >60.0 03/30/2022     Lab Results   Component Value Date    ALT 25 10/21/2022    AST 20 10/21/2022    ALKPHOS 83 10/21/2022    BILITOT 0.4 10/21/2022     Lab Results   Component Value Date    TSH 1.108 10/21/2022     Lab Results   Component Value Date    HGBA1C 5.4 03/29/2023       IMPRESSION:    Anna Marie Lindo is a 52 y.o. female with history of ADHD who presents for follow up appointment.    Status/Progress: Based on the examination today, the patient's problem(s) is/are well controlled.  New problems have not been presented today.   Co-morbidities are not complicating management of the primary condition.  There are no active rule-out diagnoses for this patient at this time.     Risk Parameters:  Patient reports no suicidal ideation  Patient reports no homicidal  ideation  Patient reports no self-injurious behavior  Patient reports no violent behavior    DIAGNOSES:    ICD-10-CM ICD-9-CM   1. ADHD (attention deficit hyperactivity disorder), inattentive type  F90.0 314.00       PLAN:  Continue Adderall XR 30 mg p.o. q.a.m. for symptoms of attention and concentration deficit      RETURN TO CLINIC:   3 months      VLADIMIR Bradford, PMHNP-BC      32 minutes of total time spent on the encounter, which includes face to face time and non-face to face time preparing to see the patient (eg. review of tests), obtaining and/or reviewing separately obtained history, documenting clinical information in the electronic health record, independently interpreting results (not separately reported), and communicating results to the patient/family/caregiver, or care coordination (not separately reported).       At this time there are no indications the patient represents an imminent danger to either themselves or others; will continue to manage treatment in the outpatient setting.    I discussed the patient's care with the patient including benefits, alternatives, possible adverse effects of the treatment plan; including the potential for metabolic complications, major organ dysfunction, black box warnings, and contraindications. The opportunity was given for questions/clarification, and after this discussion the above treatment plan was devised through shared decision making. The patient voiced their understanding of the diagnoses and treatments listed above and agreed to the treatment plan. Follow up plan was reviewed with the patient. The patient was advised to call to report any worsening of symptoms or problems with medication.    Supportive therapy and psychoeducation provided. Patient has been given crisis information including Suicide and Crisis Lifeline (call or text: [phone]). Patient also given instructions to go to the nearest ER or call 911 if unable to remain safe or if the Pt  "develops thoughts of harming self or others.    [facility]: Reviewed today to detect potential controlled substance misuse, diversion, excessive prescribing, or multiple providers prescribing controlled substances. The patients report was deemed appropriate without new medications of concern prescribed by other providers.    Documentation entered by me for this encounter may have been done in part using [employer] Direct voice recognition transcription software. Garbled syntax, mangled pronouns, and other bizarre constructions may be attributed to that software system. Although I have made an effort to ensure accuracy, "sound like" errors may exist and should be interpreted in context.    "

## 2023-08-16 ENCOUNTER — PATIENT MESSAGE (OUTPATIENT)
Dept: FAMILY MEDICINE | Facility: CLINIC | Age: 53
End: 2023-08-16
Payer: COMMERCIAL

## 2023-08-16 DIAGNOSIS — F90.0 ADHD (ATTENTION DEFICIT HYPERACTIVITY DISORDER), INATTENTIVE TYPE: ICD-10-CM

## 2023-08-16 RX ORDER — DEXTROAMPHETAMINE SACCHARATE, AMPHETAMINE ASPARTATE MONOHYDRATE, DEXTROAMPHETAMINE SULFATE AND AMPHETAMINE SULFATE 7.5; 7.5; 7.5; 7.5 MG/1; MG/1; MG/1; MG/1
30 CAPSULE, EXTENDED RELEASE ORAL EVERY MORNING
Qty: 30 CAPSULE | Refills: 0 | Status: SHIPPED | OUTPATIENT
Start: 2023-08-16 | End: 2023-09-19

## 2023-08-17 DIAGNOSIS — K21.9 GASTROESOPHAGEAL REFLUX DISEASE WITHOUT ESOPHAGITIS: ICD-10-CM

## 2023-08-17 RX ORDER — PANTOPRAZOLE SODIUM 40 MG/1
40 TABLET, DELAYED RELEASE ORAL DAILY
Qty: 90 TABLET | Refills: 3 | Status: SHIPPED | OUTPATIENT
Start: 2023-08-17 | End: 2023-11-08

## 2023-08-17 NOTE — TELEPHONE ENCOUNTER
No care due was identified.  [facility] Embedded Care Due Messages. Reference number: [id number].   8/17/2023 9:24:14 AM CDT

## 2023-09-19 DIAGNOSIS — F90.0 ADHD (ATTENTION DEFICIT HYPERACTIVITY DISORDER), INATTENTIVE TYPE: ICD-10-CM

## 2023-09-19 RX ORDER — DEXTROAMPHETAMINE SACCHARATE, AMPHETAMINE ASPARTATE MONOHYDRATE, DEXTROAMPHETAMINE SULFATE AND AMPHETAMINE SULFATE 7.5; 7.5; 7.5; 7.5 MG/1; MG/1; MG/1; MG/1
CAPSULE, EXTENDED RELEASE ORAL
Qty: 30 CAPSULE | Refills: 0 | Status: SHIPPED | OUTPATIENT
Start: 2023-09-19 | End: 2023-10-19

## 2023-10-09 ENCOUNTER — PATIENT MESSAGE (OUTPATIENT)
Dept: FAMILY MEDICINE | Facility: CLINIC | Age: 53
End: 2023-10-09
Payer: COMMERCIAL

## 2023-10-09 DIAGNOSIS — R93.5 ABNORMAL CT OF THE ABDOMEN: ICD-10-CM

## 2023-10-09 DIAGNOSIS — R19.00 INTRA-ABDOMINAL AND PELVIC SWELLING, MASS AND LUMP, UNSPECIFIED SITE: Primary | ICD-10-CM

## 2023-10-09 DIAGNOSIS — Q45.3 PANCREATIC ABNORMALITY: ICD-10-CM

## 2023-10-09 NOTE — TELEPHONE ENCOUNTER
Please let patient know that her CT abd/pelvis showed a lesion in the L kidney consistent with an angiomyolipoma. This is benign and nothing of concern. However, there was decreased attenuation seen in the head of the pancreas which may represent edema/swelling, but a developing mass was unable to be ruled out. Due to these findings, I have ordered an MRI for further evaluation of this.     I have signed for the following orders AND/OR meds.  Please call the patient and ask the patient to schedule the testing AND/OR inform about any medications that were sent. Medications have been sent to pharmacy listed below      Orders Placed This Encounter   Procedures    MRI Abdomen W WO Contrast     Standing Status:   Future     Standing Expiration Date:   10/9/2024     Order Specific Question:   Does the patient have a pacemaker or a defibrillator (Note: Some facilities may not be able to schedule an MRI for patients with pacemakers and defibrillators. You should contact your local radiology department to determine if this is the case.)?     Answer:   No     Order Specific Question:   Does the patient have an aneurysm or surgical clip, pump, nerve/brain stimulator, middle/inner ear prosthesis, or other metal implant or foreign object (bullet, shrapnel)? If they have a card related to their implant, ask them to bring it. Issues related to the implant may cause the MRI to be delayed.     Answer:   No     Order Specific Question:   Is the patient claustrophobic?     Answer:   No     Order Specific Question:   Will the patient require sedation?     Answer:   No     Order Specific Question:   Does the patient have any of the following conditions? Diabetes, History of Renal Disease or Hypertension requiring medical therapy?     Answer:   Yes     Order Specific Question:   May the Radiologist modify the order per protocol to meet the clinical needs of the patient?     Answer:   Yes     Order Specific Question:   Is this part of a  Research Study?     Answer:   No     Order Specific Question:   Does the patient have on a skin patch for medication with aluminized backing?     Answer:   No              [facility] - [place], [place] - [address] [place] [id number]  Phone: [phone] Fax: [phone]    [facility] - [place], [place] - [address] [address]  [address] [address]  [place] [address]  Phone: [phone] Fax: [phone]

## 2023-10-10 ENCOUNTER — PATIENT MESSAGE (OUTPATIENT)
Dept: FAMILY MEDICINE | Facility: CLINIC | Age: 53
End: 2023-10-10
Payer: COMMERCIAL

## 2023-10-19 DIAGNOSIS — F90.0 ADHD (ATTENTION DEFICIT HYPERACTIVITY DISORDER), INATTENTIVE TYPE: ICD-10-CM

## 2023-10-19 RX ORDER — DEXTROAMPHETAMINE SACCHARATE, AMPHETAMINE ASPARTATE MONOHYDRATE, DEXTROAMPHETAMINE SULFATE AND AMPHETAMINE SULFATE 7.5; 7.5; 7.5; 7.5 MG/1; MG/1; MG/1; MG/1
CAPSULE, EXTENDED RELEASE ORAL EVERY MORNING
Qty: 30 CAPSULE | Refills: 0 | Status: SHIPPED | OUTPATIENT
Start: 2023-10-19 | End: 2023-11-08 | Stop reason: SDUPTHER

## 2023-10-19 NOTE — TELEPHONE ENCOUNTER
Last ordered: 1 month ago (9/19/2023) by Yessica Cutler NP     Last refill: 9/19/2023       NOV 11/8/23

## 2023-10-25 ENCOUNTER — HOSPITAL ENCOUNTER (OUTPATIENT)
Dept: RADIOLOGY | Facility: HOSPITAL | Age: 53
Discharge: HOME | End: 2023-10-25
Attending: PHYSICIAN ASSISTANT
Payer: COMMERCIAL

## 2023-10-25 ENCOUNTER — PATIENT MESSAGE (OUTPATIENT)
Dept: FAMILY MEDICINE | Facility: CLINIC | Age: 53
End: 2023-10-25
Payer: COMMERCIAL

## 2023-10-25 ENCOUNTER — PATIENT MESSAGE (OUTPATIENT)
Dept: ADMINISTRATIVE | Facility: OTHER | Age: 53
End: 2023-10-25
Payer: COMMERCIAL

## 2023-10-25 DIAGNOSIS — Q45.3 PANCREATIC ABNORMALITY: ICD-10-CM

## 2023-10-25 DIAGNOSIS — R93.5 ABNORMAL CT OF THE ABDOMEN: ICD-10-CM

## 2023-10-25 DIAGNOSIS — R19.00 INTRA-ABDOMINAL AND PELVIC SWELLING, MASS AND LUMP, UNSPECIFIED SITE: ICD-10-CM

## 2023-10-25 PROCEDURE — 74183 MRI ABD W/O CNTR FLWD CNTR: CPT | Mod: TC,PO

## 2023-10-25 PROCEDURE — 74183 MRI ABDOMEN W WO CONTRAST: ICD-10-PCS | Mod: 26,,, | Performed by: RADIOLOGY

## 2023-10-25 PROCEDURE — 25500020 PHARM REV CODE 255: Mod: PO | Performed by: PHYSICIAN ASSISTANT

## 2023-10-25 PROCEDURE — 74183 MRI ABD W/O CNTR FLWD CNTR: CPT | Mod: 26,,, | Performed by: RADIOLOGY

## 2023-10-25 PROCEDURE — A9585 GADOBUTROL INJECTION: HCPCS | Mod: PO | Performed by: PHYSICIAN ASSISTANT

## 2023-10-25 RX ORDER — GADOBUTROL 604.72 MG/ML
6 INJECTION INTRAVENOUS
Status: COMPLETED | OUTPATIENT
Start: 2023-10-25 | End: 2023-10-25

## 2023-10-25 RX ADMIN — GADOBUTROL 6 ML: 604.72 INJECTION INTRAVENOUS at 09:10

## 2023-10-25 NOTE — PROGRESS NOTES
Results have been released via [employer]. Please verify that these have been viewed by patient. If not, please call patient with results.     I have sent a message to them with the following interpretation (see below).    I have reviewed your recent MRI abdomen which showed focal fatty atrophy of the pancreatic head which can be seen in the process of chronic inflammation. There were no lesions or acute processes seen. The L renal angiomyolipoma was stable. Imaging appears to be okay. No further testing needed at this time.     Please do not hesitate to call or message with any additional questions or concerns.    Magda Aguero PA-C

## 2023-11-01 ENCOUNTER — TELEPHONE (OUTPATIENT)
Dept: FAMILY MEDICINE | Facility: CLINIC | Age: 53
End: 2023-11-01
Payer: COMMERCIAL

## 2023-11-01 NOTE — TELEPHONE ENCOUNTER
Spoke with Mary at pharmacy, she states a cancellation was sent over for Pantoprazole. I told her I do not see one in our system, not to cancel the medication until we speak with Dr King or her nurse.

## 2023-11-01 NOTE — TELEPHONE ENCOUNTER
----- Message from Nat Gamble sent at 11/1/2023  4:25 PM CDT -----  Contact: Mary / [facility]  Mary is calling to speak to the nurse regarding a cancellation on a medication sent over, she would like to confirm before they cancel, since the patient have picked it up yet. Please call at     Thanks  LJ

## 2023-11-08 ENCOUNTER — OFFICE VISIT (OUTPATIENT)
Dept: PSYCHIATRY | Facility: CLINIC | Age: 53
End: 2023-11-08
Payer: COMMERCIAL

## 2023-11-08 DIAGNOSIS — F90.0 ADHD (ATTENTION DEFICIT HYPERACTIVITY DISORDER), INATTENTIVE TYPE: ICD-10-CM

## 2023-11-08 PROCEDURE — 1159F PR MEDICATION LIST DOCUMENTED IN MEDICAL RECORD: ICD-10-PCS | Mod: CPTII,95,,

## 2023-11-08 PROCEDURE — 1159F MED LIST DOCD IN RCRD: CPT | Mod: CPTII,95,,

## 2023-11-08 PROCEDURE — 1160F PR REVIEW ALL MEDS BY PRESCRIBER/CLIN PHARMACIST DOCUMENTED: ICD-10-PCS | Mod: CPTII,95,,

## 2023-11-08 PROCEDURE — 1160F RVW MEDS BY RX/DR IN RCRD: CPT | Mod: CPTII,95,,

## 2023-11-08 PROCEDURE — 99214 OFFICE O/P EST MOD 30 MIN: CPT | Mod: 95,,,

## 2023-11-08 PROCEDURE — 3044F PR MOST RECENT HEMOGLOBIN A1C LEVEL <7.0%: ICD-10-PCS | Mod: CPTII,95,,

## 2023-11-08 PROCEDURE — 99214 PR OFFICE/OUTPT VISIT, EST, LEVL IV, 30-39 MIN: ICD-10-PCS | Mod: 95,,,

## 2023-11-08 PROCEDURE — 3044F HG A1C LEVEL LT 7.0%: CPT | Mod: CPTII,95,,

## 2023-11-08 RX ORDER — DEXTROAMPHETAMINE SACCHARATE, AMPHETAMINE ASPARTATE, DEXTROAMPHETAMINE SULFATE AND AMPHETAMINE SULFATE 2.5; 2.5; 2.5; 2.5 MG/1; MG/1; MG/1; MG/1
10 TABLET ORAL
Qty: 30 TABLET | Refills: 0 | Status: SHIPPED | OUTPATIENT
Start: 2023-11-08 | End: 2023-12-20 | Stop reason: SDUPTHER

## 2023-11-08 RX ORDER — DEXTROAMPHETAMINE SACCHARATE, AMPHETAMINE ASPARTATE MONOHYDRATE, DEXTROAMPHETAMINE SULFATE AND AMPHETAMINE SULFATE 7.5; 7.5; 7.5; 7.5 MG/1; MG/1; MG/1; MG/1
30 CAPSULE, EXTENDED RELEASE ORAL EVERY MORNING
Qty: 90 CAPSULE | Refills: 0 | Status: SHIPPED | OUTPATIENT
Start: 2023-11-08 | End: 2024-01-30 | Stop reason: SDUPTHER

## 2023-11-08 RX ORDER — DEXTROAMPHETAMINE SACCHARATE, AMPHETAMINE ASPARTATE, DEXTROAMPHETAMINE SULFATE AND AMPHETAMINE SULFATE 2.5; 2.5; 2.5; 2.5 MG/1; MG/1; MG/1; MG/1
10 TABLET ORAL
Qty: 90 TABLET | Refills: 0 | Status: SHIPPED | OUTPATIENT
Start: 2023-11-08 | End: 2023-11-08

## 2023-11-08 NOTE — PROGRESS NOTES
OUTPATIENT PSYCHIATRY FOLLOW UP VISIT    Encounter Date: 11/8/2023    Clinical Status of Patient:  Outpatient (Virtual)  The patient location is: [address] [place] [address] [place]  [place] [address]  The patient phone number is: [phone]   Visit type: Virtual visit with synchronous audio and video  Each patient to whom he or she provides medical services by telemedicine is:  (1) informed of the relationship between the practitioner and patient and the respective role of any other health care provider with respect to management of the patient; and (2) notified that he or she may decline to receive medical services by telemedicine and may withdraw from such care at any time.    Chief Complaint:  Anna Marie Lindo is a 53 y.o. female who presents today for follow-up.  Met with patient.      HISTORY OF PRESENTING ILLNESS:  Anna Marie Lindo is a 53 y.o. female with history of Continue Adderall XR 30 mg p.o. q.a.m. for symptoms of attention and concentration deficit who presents for follow up appointment.      Plan at last appointment:   Continue Adderall XR 30 mg p.o. q.a.m. for symptoms of attention and concentration deficit    Psychotropic medication history:   Alprazolam      INTERVAL HISTORY:    Patient was seen at the [facility] on 03/24 for chest pain with negative cardiac workup. Denies cardiac symptoms in the interim. Believes the CP she experienced in March may have been related to a supplement she had recently started taking of the time.    Requesting to increase dose of dextroamphetamine-amphetamine. Reports afternoon breakthrough symptoms with difficulty concentrating beginning around 1400 which is affecting her occupational functioning. Also reports difficulty driving home in the evenings. Pt has experienced these symptoms for approximately 6 months; symptoms have not improved with time.      ADHD  Inattentive symptoms:  Controlled in the AM, breakthrough symptoms beginning around  1400  Hyperactive symptoms:  Controlled  Behavior at home:  Stable  Behavior at work:  Stable and productive in AM, inattention in the afternoons beginning to affect occupational performance  Side effects:  Dry mouth has improved after d/c diet coke 3 months ago.  Was having at least 6 diet cokes per day.   Weight:  Stable, no appetite suppression concerns reported     No interval episodes with symptoms consistent with wilber or hypomania.  Denied other questions and concerns.    Medication side effects: None  Medication adherence: no    PSYCHIATRIC REVIEW OF SYSTEMS:  Is patient experiencing or having changes in:  Trouble with sleep: no  Appetite changes:  no  Weight changes:  no  Lack of energy:  no  Anhedonia:  no  Somatic symptoms:  no  Anxiety/panic:  no  Irritability: no  Guilty/hopeless:  no  Concentration: difficulty at baseline with hx ADHD, improved with dextroamphetamine-amphetamine, experiencing breakthrough symptoms in the p.m.  Racing thoughts: no  Impulsive behaviors: no  Paranoia/AVH: no  Self-injurious behavior/risky behavior:  no  Any drugs:  no  Alcohol:  no      MEDICAL REVIEW OF SYSTEMS:   Pain: Denies any significant chronic or acute pain.  Constitutional: Denies fever or change in appetite.  Cardiovascular: Denies chest pain or exertional dyspnea.  Respiratory: Reji cough or orthopnea.   GI: denies abdominal pain, denies N/V  Neurological: Denies tremor, seizure, or focal weakness.  Psychiatric: See HPI above.    PAST PSYCHIATRIC, MEDICAL, AND SOCIAL HISTORY REVIEWED  The patient's past medical, family and social history have been reviewed and updated as appropriate within the electronic medical record - see encounter notes.    PAST MEDICAL HISTORY:   Past Medical History:   Diagnosis Date    GERD (gastroesophageal reflux disease)     Hypokalemia     Head trauma/Loss of consciousness: denies  Seizures: denies     PAST PSYCHIATRIC HISTORY:  First psych contact:  Today, 2/23/22  Prior  hospitalizations: denies  Prior suicide attempts or self-harm: denies  Prior diagnosis: denies  Prior psychotherapy: denies    FAMILY HISTORY:   Paternal: grandmother alcoholic; no psychiatric history or history of substance abuse or suicide  Maternal: mom undiagnosed psych estranged from kids; no history of substance abuse or suicide     SOCIAL HISTORY:   Childhood: born and raised in [place]  Marital Status:   Children: 3 daughters, all ADHD  Resides: [place]  Occupation:    Hobbies: karate  Islam: Mandaen  Education level: high school graduate  : denies  Legal: denies  Access to guns: yes     SUBSTANCE HISTORY:  Caffeine: diet coke, 6 per day  Tobacco: denies  Alcohol: occasional ~2-3 drinks at a time  Drug use: Denied current use.  Denied history of abuse or dependency.  Rehab: denies  Prior/current AA: denies      MEDICATIONS:    Current Outpatient Medications:     dextroamphetamine-amphetamine (ADDERALL XR) 30 MG 24 hr capsule, Take 1 capsule (30 mg total) by mouth every morning., Disp: 90 capsule, Rfl: 0    dextroamphetamine-amphetamine (ADDERALL) 10 mg Tab, Take 1 tablet (10 mg total) by mouth after lunch., Disp: 30 tablet, Rfl: 0    hydroCHLOROthiazide (HYDRODIURIL) 25 MG tablet, Take 1 tablet (25 mg total) by mouth once daily., Disp: 30 tablet, Rfl: 11    pantoprazole (PROTONIX) 40 MG tablet, Take 1 tablet (40 mg total) by mouth once daily., Disp: 90 tablet, Rfl: 3    potassium chloride (MICRO-K) 10 MEQ CpSR, TAKE 3 CAPSULES BY MOUTH EVERY DAY, Disp: 270 capsule, Rfl: 2    ALLERGIES:  Review of patient's allergies indicates:   Allergen Reactions    Sulfa (sulfonamide antibiotics) Itching       EXAM:  Constitutional  Vitals:  Most recent vital signs were reviewed.   Last 3 sets of VS:      11/22/2022    10:55 AM 3/29/2023     3:23 PM 8/9/2023     1:36 PM   Vitals - 1 value per visit   SYSTOLIC 121 120 115   DIASTOLIC 85 92 79   Pulse 90 80 77   Temp 97 °F  "(36.1 °C)     SPO2 98 %     Weight (lb) 150 149.2 151.24   Weight (kg) 68.04 67.677 68.6   Height 5' 3.5" (1.613 m) 5' 4" (1.626 m) 5' 4" (1.626 m)   BMI (Calculated) 26.2 25.6 25.9   Pain Score  Zero Zero      General:  unremarkable, age appropriate     Musculoskeletal  Muscle Strength/Tone:  No tremors appreciated   Gait & Station:  Unable to assess, Pt seated during virtual visit     Psychiatric  Speech:  no latency; no press   Mood & Affect:  euthymic  congruent and appropriate   Thought Process:  normal and logical   Associations:  intact   Thought Content:  normal, no suicidality, no homicidality, delusions, or paranoia   Insight:  intact   Judgement: behavior is adequate to circumstances   Orientation:  grossly intact   Memory: intact for content of interview   Language: grossly intact   Attention Span & Concentration:  able to focus   Fund of Knowledge:  intact and appropriate to age and level of education     SUICIDE RISK ASSESSMENT:  Protective factors: age, gender, no prior attempts, no prior hospitalizations, no ongoing substance abuse, no psychosis, , has children, denies SI/intent/plan, seeking treatment, access to treatment, future oriented, good primary support, no access to firearms  Risks: hx ADHD  Patient is a low immediate and long-term risk considering risk factors.     RELEVANT LABS/STUDIES:    Lab Results   Component Value Date    WBC 6.44 10/21/2022    HGB 14.2 10/21/2022    HCT 41.9 10/21/2022    MCV 90 10/21/2022     10/21/2022     BMP  Lab Results   Component Value Date     04/28/2023    K 4.2 04/28/2023     04/28/2023    CO2 29 04/28/2023    BUN 8 04/28/2023    CREATININE 0.8 04/28/2023    CALCIUM 9.9 04/28/2023    ANIONGAP 12 04/28/2023    ESTGFRAFRICA >60.0 03/30/2022    EGFRNONAA >60.0 03/30/2022     Lab Results   Component Value Date    ALT 20 03/24/2023    AST 15 03/24/2023    ALKPHOS 84 03/24/2023    BILITOT 0.7 03/24/2023     Lab Results   Component Value " Date    TSH 1.108 10/21/2022     Lab Results   Component Value Date    HGBA1C 5.4 03/29/2023       IMPRESSION:    Anna Marie Lindo is a 53 y.o. female with history of ADHD who presents for follow up appointment.    Status/Progress: Based on the examination today, the patient's problem(s) is/are inadequately controlled.  New problems have not been presented today.   Co-morbidities are not complicating management of the primary condition.  There are no active rule-out diagnoses for this patient at this time.     Risk Parameters:  Patient reports no suicidal ideation  Patient reports no homicidal ideation  Patient reports no self-injurious behavior  Patient reports no violent behavior    DIAGNOSES:    ICD-10-CM ICD-9-CM   1. ADHD (attention deficit hyperactivity disorder), inattentive type  F90.0 314.00         PLAN:  Continue dextroamphetamine-amphetamine XR 30 mg  q.a.m. for ADHD  Start dextroamphetamine-amphetamine IR 10 mg after lunch p.r.n. breakthrough symptoms of ADHD      RETURN TO CLINIC:   1 month      VLADIMIR Bradford, PMHNP-BC      30 minutes of total time spent on the encounter, which includes face to face time and non-face to face time preparing to see the patient (eg. review of tests), obtaining and/or reviewing separately obtained history, documenting clinical information in the electronic health record, independently interpreting results (not separately reported), and communicating results to the patient/family/caregiver, or care coordination (not separately reported).       At this time there are no indications the patient represents an imminent danger to either themselves or others; will continue to manage treatment in the outpatient setting.    I discussed the patient's care with the patient including benefits, alternatives, possible adverse effects of the treatment plan; including the potential for metabolic complications, major organ dysfunction, black box warnings, and contraindications. The  "opportunity was given for questions/clarification, and after this discussion the above treatment plan was devised through shared decision making. The patient voiced their understanding of the diagnoses and treatments listed above and agreed to the treatment plan. Follow up plan was reviewed with the patient. The patient was advised to call to report any worsening of symptoms or problems with medication.    Supportive therapy and psychoeducation provided. Patient has been given crisis information including Suicide and Crisis Lifeline (call or text: 421). Patient also given instructions to go to the nearest ER or call 911 if unable to remain safe or if the Pt develops thoughts of harming self or others.    [facility]: Reviewed today to detect potential controlled substance misuse, diversion, excessive prescribing, or multiple providers prescribing controlled substances. The patients report was deemed appropriate without new medications of concern prescribed by other providers.    Documentation entered by me for this encounter may have been done in part using [employer] Direct voice recognition transcription software. Garbled syntax, mangled pronouns, and other bizarre constructions may be attributed to that software system. Although I have made an effort to ensure accuracy, "sound like" errors may exist and should be interpreted in context.    "

## 2023-12-20 ENCOUNTER — LAB VISIT (OUTPATIENT)
Dept: LAB | Facility: HOSPITAL | Age: 53
End: 2023-12-20
Attending: INTERNAL MEDICINE
Payer: COMMERCIAL

## 2023-12-20 ENCOUNTER — OFFICE VISIT (OUTPATIENT)
Dept: FAMILY MEDICINE | Facility: CLINIC | Age: 53
End: 2023-12-20
Payer: COMMERCIAL

## 2023-12-20 VITALS
HEART RATE: 79 BPM | TEMPERATURE: 97 F | BODY MASS INDEX: 25.95 KG/M2 | SYSTOLIC BLOOD PRESSURE: 127 MMHG | WEIGHT: 152 LBS | HEIGHT: 64 IN | DIASTOLIC BLOOD PRESSURE: 85 MMHG

## 2023-12-20 DIAGNOSIS — F90.0 ADHD (ATTENTION DEFICIT HYPERACTIVITY DISORDER), INATTENTIVE TYPE: ICD-10-CM

## 2023-12-20 DIAGNOSIS — Z13.6 ENCOUNTER FOR LIPID SCREENING FOR CARDIOVASCULAR DISEASE: ICD-10-CM

## 2023-12-20 DIAGNOSIS — Z00.00 ENCOUNTER FOR ANNUAL HEALTH EXAMINATION: ICD-10-CM

## 2023-12-20 DIAGNOSIS — R14.0 ABDOMINAL BLOATING: ICD-10-CM

## 2023-12-20 DIAGNOSIS — Z00.00 ENCOUNTER FOR ANNUAL HEALTH EXAMINATION: Primary | ICD-10-CM

## 2023-12-20 DIAGNOSIS — Z13.220 ENCOUNTER FOR LIPID SCREENING FOR CARDIOVASCULAR DISEASE: ICD-10-CM

## 2023-12-20 LAB
25(OH)D3+25(OH)D2 SERPL-MCNC: 40 NG/ML (ref 30–96)
ALBUMIN SERPL BCP-MCNC: 4.1 G/DL (ref 3.5–5.2)
ALP SERPL-CCNC: 93 U/L (ref 55–135)
ALT SERPL W/O P-5'-P-CCNC: 29 U/L (ref 10–44)
ANION GAP SERPL CALC-SCNC: 11 MMOL/L (ref 8–16)
AST SERPL-CCNC: 24 U/L (ref 10–40)
BILIRUB SERPL-MCNC: 0.4 MG/DL (ref 0.1–1)
BUN SERPL-MCNC: 17 MG/DL (ref 6–20)
CALCIUM SERPL-MCNC: 9.8 MG/DL (ref 8.7–10.5)
CHLORIDE SERPL-SCNC: 101 MMOL/L (ref 95–110)
CHOLEST SERPL-MCNC: 222 MG/DL (ref 120–199)
CHOLEST/HDLC SERPL: 3.8 {RATIO} (ref 2–5)
CO2 SERPL-SCNC: 27 MMOL/L (ref 23–29)
CREAT SERPL-MCNC: 0.8 MG/DL (ref 0.5–1.4)
ERYTHROCYTE [DISTWIDTH] IN BLOOD BY AUTOMATED COUNT: 12.4 % (ref 11.5–14.5)
EST. GFR  (NO RACE VARIABLE): >60 ML/MIN/1.73 M^2
GLUCOSE SERPL-MCNC: 99 MG/DL (ref 70–110)
HCT VFR BLD AUTO: 44.7 % (ref 37–48.5)
HDLC SERPL-MCNC: 59 MG/DL (ref 40–75)
HDLC SERPL: 26.6 % (ref 20–50)
HGB BLD-MCNC: 14.9 G/DL (ref 12–16)
LDLC SERPL CALC-MCNC: 141 MG/DL (ref 63–159)
MCH RBC QN AUTO: 30.9 PG (ref 27–31)
MCHC RBC AUTO-ENTMCNC: 33.3 G/DL (ref 32–36)
MCV RBC AUTO: 93 FL (ref 82–98)
NONHDLC SERPL-MCNC: 163 MG/DL
PLATELET # BLD AUTO: 325 K/UL (ref 150–450)
PMV BLD AUTO: 9.9 FL (ref 9.2–12.9)
POTASSIUM SERPL-SCNC: 3.4 MMOL/L (ref 3.5–5.1)
PROT SERPL-MCNC: 7.5 G/DL (ref 6–8.4)
RBC # BLD AUTO: 4.82 M/UL (ref 4–5.4)
SODIUM SERPL-SCNC: 139 MMOL/L (ref 136–145)
TRIGL SERPL-MCNC: 110 MG/DL (ref 30–150)
WBC # BLD AUTO: 6.01 K/UL (ref 3.9–12.7)

## 2023-12-20 PROCEDURE — 99396 PR PREVENTIVE VISIT,EST,40-64: ICD-10-PCS | Mod: S$GLB,,, | Performed by: INTERNAL MEDICINE

## 2023-12-20 PROCEDURE — 36415 COLL VENOUS BLD VENIPUNCTURE: CPT | Mod: PO | Performed by: INTERNAL MEDICINE

## 2023-12-20 PROCEDURE — 3079F DIAST BP 80-89 MM HG: CPT | Mod: CPTII,S$GLB,, | Performed by: INTERNAL MEDICINE

## 2023-12-20 PROCEDURE — 82306 VITAMIN D 25 HYDROXY: CPT | Performed by: INTERNAL MEDICINE

## 2023-12-20 PROCEDURE — 3044F PR MOST RECENT HEMOGLOBIN A1C LEVEL <7.0%: ICD-10-PCS | Mod: CPTII,S$GLB,, | Performed by: INTERNAL MEDICINE

## 2023-12-20 PROCEDURE — 3074F SYST BP LT 130 MM HG: CPT | Mod: CPTII,S$GLB,, | Performed by: INTERNAL MEDICINE

## 2023-12-20 PROCEDURE — 99396 PREV VISIT EST AGE 40-64: CPT | Mod: S$GLB,,, | Performed by: INTERNAL MEDICINE

## 2023-12-20 PROCEDURE — 80053 COMPREHEN METABOLIC PANEL: CPT | Performed by: INTERNAL MEDICINE

## 2023-12-20 PROCEDURE — 80061 LIPID PANEL: CPT | Performed by: INTERNAL MEDICINE

## 2023-12-20 PROCEDURE — 99999 PR PBB SHADOW E&M-EST. PATIENT-LVL III: ICD-10-PCS | Mod: PBBFAC,,, | Performed by: INTERNAL MEDICINE

## 2023-12-20 PROCEDURE — 3079F PR MOST RECENT DIASTOLIC BLOOD PRESSURE 80-89 MM HG: ICD-10-PCS | Mod: CPTII,S$GLB,, | Performed by: INTERNAL MEDICINE

## 2023-12-20 PROCEDURE — 3008F PR BODY MASS INDEX (BMI) DOCUMENTED: ICD-10-PCS | Mod: CPTII,S$GLB,, | Performed by: INTERNAL MEDICINE

## 2023-12-20 PROCEDURE — 3074F PR MOST RECENT SYSTOLIC BLOOD PRESSURE < 130 MM HG: ICD-10-PCS | Mod: CPTII,S$GLB,, | Performed by: INTERNAL MEDICINE

## 2023-12-20 PROCEDURE — 3008F BODY MASS INDEX DOCD: CPT | Mod: CPTII,S$GLB,, | Performed by: INTERNAL MEDICINE

## 2023-12-20 PROCEDURE — 1159F MED LIST DOCD IN RCRD: CPT | Mod: CPTII,S$GLB,, | Performed by: INTERNAL MEDICINE

## 2023-12-20 PROCEDURE — 85027 COMPLETE CBC AUTOMATED: CPT | Performed by: INTERNAL MEDICINE

## 2023-12-20 PROCEDURE — 3044F HG A1C LEVEL LT 7.0%: CPT | Mod: CPTII,S$GLB,, | Performed by: INTERNAL MEDICINE

## 2023-12-20 PROCEDURE — 99999 PR PBB SHADOW E&M-EST. PATIENT-LVL III: CPT | Mod: PBBFAC,,, | Performed by: INTERNAL MEDICINE

## 2023-12-20 PROCEDURE — 1159F PR MEDICATION LIST DOCUMENTED IN MEDICAL RECORD: ICD-10-PCS | Mod: CPTII,S$GLB,, | Performed by: INTERNAL MEDICINE

## 2023-12-20 NOTE — PROGRESS NOTES
This note is specifically for wellness visit performed today.       Assessment / Plan:      Patient here for annual wellness exam. Health maintenance was reviewed and ordered.    Complete history and physical was completed today.  Complete and thorough medication reconciliation was performed.  Discussed risks and benefits of medications.  Advised patient on orders and health maintenance. Continue current medications listed on your summary sheet.    All questions were answered. Patient had no further concerns. Advised of diagnoses and plan.     Problem List Items Addressed This Visit          Psychiatric    ADHD (attention deficit hyperactivity disorder), inattentive type    Overview     -managed by psychiatry  -currently on adderall xr 30 mg QD          Other Visit Diagnoses       Encounter for annual health examination    -  Primary    Relevant Orders    CBC Without Differential    Vitamin D    Abdominal bloating      -continued symptoms of post-prandial abdominal bloating/mild epigastric pain  -denies associated symptoms of N/V, diarrhea. Sometimes struggles with constipation  -recent MRI showing pancreatic atrophy/fatty changes  -checking fecal fat to rule out pancreatic enzyme deficiency  -if negative and symptoms persist, will plan for e-consult for EGD  -normal c-scope last year    Relevant Orders    Fecal fat, qualitative            Follow up for labs today: cbc,cmp,lipid,vit d, stool study.    Dacia King MD       WELLNESS EXAM      Patient ID: Anna Marie Lindo is a 53 y.o. female.  has a past medical history of GERD (gastroesophageal reflux disease) and Hypokalemia.     Chief Complaint:  Encounter for wellness exam    Well Adult Physical: Patient here for a comprehensive physical exam.     Health Maintenance Topics with due status: Not Due       Topic Last Completion Date    TETANUS VACCINE 02/09/2022    Colorectal Cancer Screening 04/26/2022    Lipid Panel 10/21/2022    Hemoglobin A1c (Diabetic  Prevention Screening) 03/29/2023    Mammogram 05/02/2023        ==============================================    Health Maintenance Due   Topic Date Due    HIV Screening  Never done    Shingles Vaccine (1 of 2) Never done    COVID-19 Vaccine (3 - 2023-24 season) 09/01/2023       Past Medical History:  Past Medical History:   Diagnosis Date    GERD (gastroesophageal reflux disease)     Hypokalemia      Past Surgical History:   Procedure Laterality Date    ABDOMINOPLASTY      BILATERAL TUBAL LIGATION      COLONOSCOPY N/A 4/26/2022    Procedure: COLONOSCOPY;  Surgeon: Ubaldo Gutierrez MD;  Location: [facility];  Service: Endoscopy;  Laterality: N/A;    COSMETIC SURGERY  August 2016    Tummy tuck    TOTAL ABDOMINAL HYSTERECTOMY      TUBAL LIGATION  June 1997     Review of patient's allergies indicates:   Allergen Reactions    Sulfa (sulfonamide antibiotics) Itching     Current Outpatient Medications on File Prior to Visit   Medication Sig Dispense Refill    CALCIUM-VITAMIN D3-MAGNESIUM ORAL Take by mouth.      dextroamphetamine-amphetamine (ADDERALL XR) 30 MG 24 hr capsule Take 1 capsule (30 mg total) by mouth every morning. 90 capsule 0    hydroCHLOROthiazide (HYDRODIURIL) 25 MG tablet Take 1 tablet (25 mg total) by mouth once daily. 30 tablet 11    multivit-min/iron/FA/vit K/lut (CENTRUM MINIS WOMEN 50 PLUS ORAL) Take by mouth.      potassium chloride (MICRO-K) 10 MEQ CpSR TAKE 3 CAPSULES BY MOUTH EVERY  capsule 2    [DISCONTINUED] dextroamphetamine-amphetamine (ADDERALL) 10 mg Tab Take 1 tablet (10 mg total) by mouth after lunch. 30 tablet 0     No current facility-administered medications on file prior to visit.     Social History     Socioeconomic History    Marital status:    Tobacco Use    Smoking status: Never    Smokeless tobacco: Never   Substance and Sexual Activity    Alcohol use: Not Currently    Drug use: Never    Sexual activity: Yes     Partners: Male     Birth  control/protection: See Surgical Hx     Family History   Problem Relation Age of Onset    Diabetes Mother     Heart disease Mother     Kidney disease Mother         Probably from diabetes    Diabetes Father     Heart disease Father     Cancer Father         stomach    Early death Brother         Blood clot after surgery    Colon cancer Maternal Grandmother     Alcohol abuse Maternal Grandfather     Alcohol abuse Paternal Grandmother        Review of Systems   Constitutional:  Negative for chills, fever and malaise/fatigue.   HENT:  Negative for congestion, hearing loss and sore throat.    Eyes:  Negative for blurred vision, double vision and discharge.   Respiratory:  Negative for cough, shortness of breath and wheezing.    Cardiovascular:  Negative for chest pain, palpitations and leg swelling.   Gastrointestinal:  Positive for abdominal pain and heartburn. Negative for blood in stool, constipation, diarrhea, nausea and vomiting.   Genitourinary:  Negative for dysuria, frequency and hematuria.   Musculoskeletal:  Negative for back pain, joint pain and neck pain.   Neurological:  Negative for weakness and headaches.   Endo/Heme/Allergies:  Negative for polydipsia.   Psychiatric/Behavioral:  Negative for depression. The patient is not nervous/anxious.          Objective:      Vitals:    12/20/23 0839   BP: 127/85   Pulse: 79   Temp: 97.3 °F (36.3 °C)     Body mass index is 26.09 kg/m².     Physical Exam  Constitutional:       General: She is not in acute distress.     Appearance: Normal appearance. She is well-developed.   HENT:      Head: Normocephalic and atraumatic.   Eyes:      Conjunctiva/sclera: Conjunctivae normal.   Cardiovascular:      Rate and Rhythm: Normal rate and regular rhythm.      Pulses: Normal pulses.      Heart sounds: Normal heart sounds. No murmur heard.  Pulmonary:      Effort: Pulmonary effort is normal. No respiratory distress.      Breath sounds: Normal breath sounds.   Abdominal:       General: Bowel sounds are normal.      Palpations: Abdomen is soft.      Tenderness: There is no abdominal tenderness. There is no guarding or rebound.      Hernia: No hernia is present.   Musculoskeletal:         General: Normal range of motion.      Cervical back: Normal range of motion and neck supple.   Skin:     General: Skin is warm and dry.      Findings: No rash.   Neurological:      General: No focal deficit present.      Mental Status: She is alert and oriented to person, place, and time.   Psychiatric:         Mood and Affect: Mood normal.         Behavior: Behavior normal.         All labs, imaging and procedures performed since last visit have been personally reviewed.    Answers submitted by the patient for this visit:  Review of Systems Questionnaire (Submitted on 12/20/2023)  activity change: No  unexpected weight change: No  rhinorrhea: No  trouble swallowing: No  visual disturbance: No  chest tightness: No  polyuria: No  difficulty urinating: No  menstrual problem: No  joint swelling: No  arthralgias: No  confusion: No  dysphoric mood: No

## 2023-12-24 DIAGNOSIS — E87.6 HYPOKALEMIA: ICD-10-CM

## 2023-12-24 NOTE — TELEPHONE ENCOUNTER
No care due was identified.  [facility] Embedded Care Due Messages. Reference number: [id number].   12/24/2023 7:12:29 AM CST

## 2023-12-26 RX ORDER — POTASSIUM CHLORIDE 750 MG/1
CAPSULE, EXTENDED RELEASE ORAL
Qty: 270 CAPSULE | Refills: 3 | Status: SHIPPED | OUTPATIENT
Start: 2023-12-26

## 2023-12-26 NOTE — TELEPHONE ENCOUNTER
Anna Marie Lindo  is requesting a refill authorization.  Brief Assessment and Rationale for Refill:  Approve     Medication Therapy Plan:         Comments:     Note composed:8:29 AM 12/26/2023

## 2023-12-29 PROCEDURE — 82705 FATS/LIPIDS FECES QUAL: CPT | Performed by: INTERNAL MEDICINE

## 2024-01-20 DIAGNOSIS — R60.9 EDEMA, UNSPECIFIED TYPE: ICD-10-CM

## 2024-01-20 NOTE — TELEPHONE ENCOUNTER
No care due was identified.  [facility] Embedded Care Due Messages. Reference number: [id number].   1/20/2024 1:03:29 PM CST

## 2024-01-21 RX ORDER — HYDROCHLOROTHIAZIDE 25 MG/1
25 TABLET ORAL DAILY
Qty: 90 TABLET | Refills: 3 | Status: SHIPPED | OUTPATIENT
Start: 2024-01-21 | End: 2024-06-07 | Stop reason: SDUPTHER

## 2024-01-21 NOTE — TELEPHONE ENCOUNTER
Refill Routing Note   Medication(s) are not appropriate for processing by [facility] for the following reason(s):        Required labs abnormal  No active prescription written by provider    [facility] action(s):  Defer               Appointments  past 12m or future 3m with PCP    Date Provider   Last Visit   12/20/2023 Dacia King MD   Next Visit   Visit date not found Dacia King MD   ED visits in past 90 days: 0        Note composed:4:50 PM 01/21/2024

## 2024-01-30 ENCOUNTER — OFFICE VISIT (OUTPATIENT)
Dept: PSYCHIATRY | Facility: CLINIC | Age: 54
End: 2024-01-30
Payer: COMMERCIAL

## 2024-01-30 DIAGNOSIS — F90.0 ADHD (ATTENTION DEFICIT HYPERACTIVITY DISORDER), INATTENTIVE TYPE: ICD-10-CM

## 2024-01-30 PROCEDURE — 1160F RVW MEDS BY RX/DR IN RCRD: CPT | Mod: CPTII,95,,

## 2024-01-30 PROCEDURE — 1159F MED LIST DOCD IN RCRD: CPT | Mod: CPTII,95,,

## 2024-01-30 PROCEDURE — 99214 OFFICE O/P EST MOD 30 MIN: CPT | Mod: 95,,,

## 2024-01-30 RX ORDER — DEXTROAMPHETAMINE SACCHARATE, AMPHETAMINE ASPARTATE, DEXTROAMPHETAMINE SULFATE AND AMPHETAMINE SULFATE 2.5; 2.5; 2.5; 2.5 MG/1; MG/1; MG/1; MG/1
10 TABLET ORAL
Qty: 90 TABLET | Refills: 0 | Status: SHIPPED | OUTPATIENT
Start: 2024-01-30 | End: 2024-05-10 | Stop reason: SDUPTHER

## 2024-01-30 RX ORDER — DEXTROAMPHETAMINE SACCHARATE, AMPHETAMINE ASPARTATE, DEXTROAMPHETAMINE SULFATE AND AMPHETAMINE SULFATE 2.5; 2.5; 2.5; 2.5 MG/1; MG/1; MG/1; MG/1
10 TABLET ORAL
Qty: 30 TABLET | Refills: 0 | Status: SHIPPED | OUTPATIENT
Start: 2024-01-30 | End: 2024-01-30 | Stop reason: SDUPTHER

## 2024-01-30 RX ORDER — DEXTROAMPHETAMINE SACCHARATE, AMPHETAMINE ASPARTATE MONOHYDRATE, DEXTROAMPHETAMINE SULFATE AND AMPHETAMINE SULFATE 7.5; 7.5; 7.5; 7.5 MG/1; MG/1; MG/1; MG/1
30 CAPSULE, EXTENDED RELEASE ORAL EVERY MORNING
Qty: 90 CAPSULE | Refills: 0 | Status: SHIPPED | OUTPATIENT
Start: 2024-01-30 | End: 2024-05-10 | Stop reason: SDUPTHER

## 2024-01-30 NOTE — PROGRESS NOTES
OUTPATIENT PSYCHIATRY FOLLOW UP VISIT    Encounter Date: 1/30/2024    Clinical Status of Patient:  Outpatient (Virtual)  The patient location is: [address] [place], [place] [address] [facility] [address]  The patient phone number is: [phone]   Visit type: Virtual visit with synchronous audio and video  Each patient to whom he or she provides medical services by telemedicine is:  (1) informed of the relationship between the practitioner and patient and the respective role of any other health care provider with respect to management of the patient; and (2) notified that he or she may decline to receive medical services by telemedicine and may withdraw from such care at any time.    Chief Complaint:  Anna Marie Lindo is a 53 y.o. female who presents today for follow-up.  Met with patient.      HISTORY OF PRESENTING ILLNESS:  Anna Marie Lindo is a 53 y.o. female with history of ADHD-IT who presents for follow up appointment.      11/8/2023: Patient was seen at the [facility] on 03/24 for chest pain with negative cardiac workup. Denies cardiac symptoms in the interim. Believes the CP she experienced in March may have been related to a supplement she had recently started taking of the time.  Requesting to increase dose of dextroamphetamine-amphetamine. Reports afternoon breakthrough symptoms with difficulty concentrating beginning around 1400 which is affecting her occupational functioning. Also reports difficulty driving home in the evenings. Pt has experienced these symptoms for approximately 6 months; symptoms have not improved with time.      Plan at last appointment:   Continue dextroamphetamine-amphetamine XR 30 mg  q.a.m. for ADHD  Start dextroamphetamine-amphetamine IR 10 mg after lunch p.r.n. breakthrough symptoms of ADHD    Psychotropic medication history:   Alprazolam      INTERVAL HISTORY:    Dextroamphetamine-amphetamine immediate release 10 mg Q afternoon was started at last visit to target afternoon  "breakthrough symptoms of inattention and concentration difficulty.  She has struggled to remember to take the afternoon dose, but setting an alarm to remind her to take it has helped.  She reports marked improvement in afternoon breakthrough symptoms and is now able to focus and concentrate throughout her work day.  She has no longer having difficulty driving home in the evenings due to inattention.     Enrolled in [employer] for a bachelor's in business operations management. Started January 8th. Her employer is paying for approx half of tuition.     Mood is stable. "Everything else is great!"    ADHD  Inattentive symptoms:  Controlled  Hyperactive symptoms:  Controlled  Behavior at home:  Stable  Behavior at work:  Stable and productive  Side effects:  Pt denies cardiovascular events including increased heart rate or increased blood pressure, palpitations, chest pain, dizziness, lightheadedness, or syncopal episodes. Pt denies A/V hallucinations or behavioral effects. Pt denies anorexia, decreased appetite, xerostomia, headache, insomnia, or digital changes.    No interval episodes with symptoms consistent with wilber or hypomania.  Denied interval or current suicidal/homicidal thoughts, intent, or plan or NSSI.  Denied other questions and concerns.  Patient reports feeling stable and wishing to continue current management unchanged.    Medication side effects: None  Medication adherence: no    PSYCHIATRIC REVIEW OF SYSTEMS:  Is patient experiencing or having changes in:  Trouble with sleep: no  Appetite changes:  no  Weight changes:  no  Lack of energy:  no  Anhedonia:  no  Somatic symptoms:  no  Anxiety/panic:  no  Irritability: no  Guilty/hopeless:  no  Concentration: difficulty at baseline with hx ADHD, improved with dextroamphetamine-amphetamine  Racing thoughts: no  Impulsive behaviors: no  Paranoia/AVH: no  Self-injurious behavior/risky behavior:  no  Any drugs:  no  Alcohol:  no      MEDICAL REVIEW OF " SYSTEMS:   Pain: Denies any significant chronic or acute pain.  Constitutional: Denies fever or change in appetite.  Cardiovascular: Denies chest pain or exertional dyspnea.  Respiratory: Reji cough or orthopnea.   GI: denies abdominal pain, denies N/V  Neurological: Denies tremor, seizure, or focal weakness.  Psychiatric: See HPI above.    PAST PSYCHIATRIC, MEDICAL, AND SOCIAL HISTORY REVIEWED  The patient's past medical, family and social history have been reviewed and updated as appropriate within the electronic medical record - see encounter notes.    PAST MEDICAL HISTORY:   Past Medical History:   Diagnosis Date    GERD (gastroesophageal reflux disease)     Hypokalemia     Head trauma/Loss of consciousness: denies  Seizures: denies     PAST PSYCHIATRIC HISTORY:  First psych contact:  Today, 2/23/22  Prior hospitalizations: denies  Prior suicide attempts or self-harm: denies  Prior diagnosis: denies  Prior psychotherapy: denies      MEDICATIONS:    Current Outpatient Medications:     CALCIUM-VITAMIN D3-MAGNESIUM ORAL, Take by mouth., Disp: , Rfl:     dextroamphetamine-amphetamine (ADDERALL XR) 30 MG 24 hr capsule, Take 1 capsule (30 mg total) by mouth every morning., Disp: 90 capsule, Rfl: 0    dextroamphetamine-amphetamine (ADDERALL) 10 mg Tab, Take 1 tablet (10 mg total) by mouth daily with lunch., Disp: 30 tablet, Rfl: 0    hydroCHLOROthiazide (HYDRODIURIL) 25 MG tablet, Take 1 tablet (25 mg total) by mouth once daily., Disp: 90 tablet, Rfl: 3    multivit-min/iron/FA/vit K/lut (CENTRUM MINIS WOMEN 50 PLUS ORAL), Take by mouth., Disp: , Rfl:     potassium chloride (MICRO-K) 10 MEQ CpSR, TAKE 3 CAPSULES BY MOUTH EVERY DAY, Disp: 270 capsule, Rfl: 3    ALLERGIES:  Review of patient's allergies indicates:   Allergen Reactions    Sulfa (sulfonamide antibiotics) Itching       EXAM:  Constitutional  Vitals:  Most recent vital signs were reviewed.   Last 3 sets of VS:      3/29/2023     3:23 PM 8/9/2023     1:36 PM  "12/20/2023     8:39 AM   Vitals - 1 value per visit   SYSTOLIC 120 115 127   DIASTOLIC 92 79 85   Pulse 80 77 79   Temp   97.3 °F (36.3 °C)   Weight (lb) 149.2 151.24 152   Weight (kg) 67.677 68.6 68.947   Height 5' 4" (1.626 m) 5' 4" (1.626 m) 5' 4" (1.626 m)   BMI (Calculated) 25.6 25.9 26.1   Pain Score Zero Zero Zero      General:  unremarkable, age appropriate     Musculoskeletal  Muscle Strength/Tone:  No tremors appreciated   Gait & Station:  Pt seated during virtual visit     Psychiatric  Speech:  no latency; no press   Mood & Affect:  euthymic  congruent and appropriate   Thought Process:  normal and logical   Associations:  intact   Thought Content:  normal, no suicidality, no homicidality, delusions, or paranoia   Insight:  intact   Judgement: behavior is adequate to circumstances   Orientation:  grossly intact   Memory: intact for content of interview   Language: grossly intact   Attention Span & Concentration:  able to focus   Fund of Knowledge:  intact and appropriate to age and level of education     SUICIDE RISK ASSESSMENT:  Protective factors: age, gender, no prior attempts, no prior hospitalizations, no ongoing substance abuse, no psychosis, , has children, denies SI/intent/plan, seeking treatment, access to treatment, future oriented, good primary support, no access to firearms  Risks: hx ADHD  Patient is a low immediate and long-term risk considering risk factors.     RELEVANT LABS/STUDIES:    Lab Results   Component Value Date    WBC 6.01 12/20/2023    HGB 14.9 12/20/2023    HCT 44.7 12/20/2023    MCV 93 12/20/2023     12/20/2023     BMP  Lab Results   Component Value Date     12/20/2023    K 3.4 (L) 12/20/2023     12/20/2023    CO2 27 12/20/2023    BUN 17 12/20/2023    CREATININE 0.8 12/20/2023    CALCIUM 9.8 12/20/2023    ANIONGAP 11 12/20/2023    ESTGFRAFRICA >60.0 03/30/2022    EGFRNONAA >60.0 03/30/2022     Lab Results   Component Value Date    ALT 29 12/20/2023    " AST 24 12/20/2023    ALKPHOS 93 12/20/2023    BILITOT 0.4 12/20/2023     Lab Results   Component Value Date    TSH 1.108 10/21/2022     Lab Results   Component Value Date    HGBA1C 5.4 03/29/2023       IMPRESSION:    Anna Marie Lindo is a 53 y.o. female with history of ADHD who presents for follow up appointment.    Status/Progress: Based on the examination today, the patient's problem(s) is/are improved and well controlled.  New problems have not been presented today.   Co-morbidities are not complicating management of the primary condition.  There are no active rule-out diagnoses for this patient at this time.     Risk Parameters:  Patient reports no suicidal ideation  Patient reports no homicidal ideation  Patient reports no self-injurious behavior  Patient reports no violent behavior    DIAGNOSES:    ICD-10-CM ICD-9-CM   1. ADHD (attention deficit hyperactivity disorder), inattentive type  F90.0 314.00       PLAN:  Continue dextroamphetamine-amphetamine XR 30 mg  q.a.m. for ADHD  Continue  dextroamphetamine-amphetamine IR 10 mg after lunch p.r.n. breakthrough symptoms of ADHD      RETURN TO CLINIC:   3 months      VLADIMIR Bradford, PMHNP-BC    30 minutes of total time spent on the encounter, which includes face to face time and non-face to face time preparing to see the patient (eg. review of tests), obtaining and/or reviewing separately obtained history, documenting clinical information in the electronic health record, independently interpreting results (not separately reported), and communicating results to the patient/family/caregiver, or care coordination (not separately reported).     At this time there are no indications the patient represents an imminent danger to either themselves or others; will continue to manage treatment in the outpatient setting.    I discussed the patient's care with the patient including benefits, alternatives, possible adverse effects of the treatment plan; including the  "potential for metabolic complications, major organ dysfunction, black box warnings, and contraindications. The opportunity was given for questions/clarification, and after this discussion the above treatment plan was devised through shared decision making. The patient voiced their understanding of the diagnoses and treatments listed above and agreed to the treatment plan. Follow up plan was reviewed with the patient. The patient was advised to call to report any worsening of symptoms or problems with medication.    Supportive therapy and psychoeducation provided. Patient has been given crisis information including Suicide and Crisis Lifeline (call or text: 033). Patient also given instructions to go to the nearest ER or call 911 if unable to remain safe or if the Pt develops thoughts of harming self or others.    [facility]: Reviewed today to detect potential controlled substance misuse, diversion, excessive prescribing, or multiple providers prescribing controlled substances. The patients report was deemed appropriate without new medications of concern prescribed by other providers.    Documentation entered by me for this encounter may have been done in part using [employer] voice recognition transcription software. Garbled syntax, mangled pronouns, and other bizarre constructions may be attributed to that software system. Although I have made an effort to ensure accuracy, "sound like" errors may exist and should be interpreted in context.    "

## 2024-01-30 NOTE — TELEPHONE ENCOUNTER
Please send 10 [facility] and cancel at [facility] ( they do not have any in stock)

## 2024-05-08 DIAGNOSIS — Z12.31 OTHER SCREENING MAMMOGRAM: ICD-10-CM

## 2024-05-10 ENCOUNTER — OFFICE VISIT (OUTPATIENT)
Dept: PSYCHIATRY | Facility: CLINIC | Age: 54
End: 2024-05-10
Payer: COMMERCIAL

## 2024-05-10 DIAGNOSIS — F90.0 ADHD (ATTENTION DEFICIT HYPERACTIVITY DISORDER), INATTENTIVE TYPE: ICD-10-CM

## 2024-05-10 PROCEDURE — 1160F RVW MEDS BY RX/DR IN RCRD: CPT | Mod: CPTII,95,,

## 2024-05-10 PROCEDURE — 1159F MED LIST DOCD IN RCRD: CPT | Mod: CPTII,95,,

## 2024-05-10 PROCEDURE — 99214 OFFICE O/P EST MOD 30 MIN: CPT | Mod: 95,,,

## 2024-05-10 RX ORDER — DEXTROAMPHETAMINE SACCHARATE, AMPHETAMINE ASPARTATE, DEXTROAMPHETAMINE SULFATE AND AMPHETAMINE SULFATE 2.5; 2.5; 2.5; 2.5 MG/1; MG/1; MG/1; MG/1
10 TABLET ORAL
Qty: 90 TABLET | Refills: 0 | Status: SHIPPED | OUTPATIENT
Start: 2024-05-10 | End: 2024-08-08

## 2024-05-10 RX ORDER — DEXTROAMPHETAMINE SACCHARATE, AMPHETAMINE ASPARTATE MONOHYDRATE, DEXTROAMPHETAMINE SULFATE AND AMPHETAMINE SULFATE 7.5; 7.5; 7.5; 7.5 MG/1; MG/1; MG/1; MG/1
30 CAPSULE, EXTENDED RELEASE ORAL EVERY MORNING
Qty: 90 CAPSULE | Refills: 0 | Status: SHIPPED | OUTPATIENT
Start: 2024-05-10 | End: 2024-08-08

## 2024-05-10 NOTE — PROGRESS NOTES
OUTPATIENT PSYCHIATRY FOLLOW UP VISIT    Encounter Date: 5/10/2024    Clinical Status of Patient:  Outpatient (Virtual)  The patient location is: [address] [place], [place] [address] [address]  The patient phone number is: [phone]   Visit type: Virtual visit with synchronous audio and video  Each patient to whom he or she provides medical services by telemedicine is:  (1) informed of the relationship between the practitioner and patient and the respective role of any other health care provider with respect to management of the patient; and (2) notified that he or she may decline to receive medical services by telemedicine and may withdraw from such care at any time.    Chief Complaint:  Anna Marie Lindo is a 53 y.o. female who presents today for follow-up.  Met with patient.      HISTORY OF PRESENTING ILLNESS:  Anna Marie Lindo is a 53 y.o. female with history of ADHD-IT who presents for follow up appointment.      11/8/2023: Patient was seen at the [facility] on 03/24 for chest pain with negative cardiac workup. Denies cardiac symptoms in the interim. Believes the CP she experienced in March may have been related to a supplement she had recently started taking of the time.  Requesting to increase dose of dextroamphetamine-amphetamine. Reports afternoon breakthrough symptoms with difficulty concentrating beginning around 1400 which is affecting her occupational functioning. Also reports difficulty driving home in the evenings. Pt has experienced these symptoms for approximately 6 months; symptoms have not improved with time.    1/30/2024: Dextroamphetamine-amphetamine immediate release 10 mg Q afternoon was started at last visit to target afternoon breakthrough symptoms of inattention and concentration difficulty.  She has struggled to remember to take the afternoon dose, but setting an alarm to remind her to take it has helped.  She reports marked improvement in afternoon breakthrough symptoms  "and is now able to focus and concentrate throughout her work day.  She has no longer having difficulty driving home in the evenings due to inattention.     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 Started January 8th. Her employer is paying for approx half of tuition.     Mood is stable. "Everything else is great!"    Plan at last appointment:   Continue dextroamphetamine-amphetamine XR 30 mg  q.a.m. for ADHD  Continue  dextroamphetamine-amphetamine IR 10 mg after lunch p.r.n. breakthrough symptoms of ADHD    Psychotropic medication history:   Alprazolam      INTERVAL HISTORY:        ADHD  Inattentive symptoms:  Controlled  Hyperactive symptoms:  Controlled  Behavior at home:  Stable  Behavior at work:  Stable and productive  Side effects:  Pt denies cardiovascular events including increased heart rate or increased blood pressure, palpitations, chest pain, dizziness, lightheadedness, or syncopal episodes. Pt denies A/V hallucinations or behavioral effects. Pt denies anorexia, decreased appetite, xerostomia, headache, insomnia, or digital changes.    No interval episodes with symptoms consistent with wilber or hypomania.  Denied interval or current suicidal/homicidal thoughts, intent, or plan or NSSI.  Denied other questions and concerns.  Patient reports feeling stable and wishing to continue current management unchanged.    Medication side effects: None  Medication adherence: no    PSYCHIATRIC REVIEW OF SYSTEMS:  Is patient experiencing or having changes in:  Trouble with sleep: no  Appetite changes:  no  Weight changes:  no  Lack of energy:  no  Anhedonia:  no  Somatic symptoms:  no  Anxiety/panic:  no  Irritability: no  Guilty/hopeless:  no  Concentration: difficulty at baseline with hx ADHD, improved with dextroamphetamine-amphetamine  Racing thoughts: no  Impulsive behaviors: no  Paranoia/AVH: no  Self-injurious behavior/risky behavior:  no  Any drugs:  no  Alcohol:  " no      MEDICAL REVIEW OF SYSTEMS:   Pain: Denies any significant chronic or acute pain.  Constitutional: Denies fever or change in appetite.  Cardiovascular: Denies chest pain or exertional dyspnea.  Respiratory: Reji cough or orthopnea.   GI: denies abdominal pain, denies N/V  Neurological: Denies tremor, seizure, or focal weakness.  Psychiatric: See HPI above.    PAST PSYCHIATRIC, MEDICAL, AND SOCIAL HISTORY REVIEWED  The patient's past medical, family and social history have been reviewed and updated as appropriate within the electronic medical record - see encounter notes.    PAST MEDICAL HISTORY:   Past Medical History:   Diagnosis Date    GERD (gastroesophageal reflux disease)     Hypokalemia     Head trauma/Loss of consciousness: denies  Seizures: denies     PAST PSYCHIATRIC HISTORY:  First psych contact:  Today, 2/23/22  Prior hospitalizations: denies  Prior suicide attempts or self-harm: denies  Prior diagnosis: denies  Prior psychotherapy: denies      MEDICATIONS:    Current Outpatient Medications:     CALCIUM-VITAMIN D3-MAGNESIUM ORAL, Take by mouth., Disp: , Rfl:     dextroamphetamine-amphetamine (ADDERALL) 10 mg Tab, Take 1 tablet (10 mg total) by mouth daily with lunch., Disp: 90 tablet, Rfl: 0    hydroCHLOROthiazide (HYDRODIURIL) 25 MG tablet, Take 1 tablet (25 mg total) by mouth once daily., Disp: 90 tablet, Rfl: 3    multivit-min/iron/FA/vit K/lut (CENTRUM MINIS WOMEN 50 PLUS ORAL), Take by mouth., Disp: , Rfl:     potassium chloride (MICRO-K) 10 MEQ CpSR, TAKE 3 CAPSULES BY MOUTH EVERY DAY, Disp: 270 capsule, Rfl: 3    ALLERGIES:  Review of patient's allergies indicates:   Allergen Reactions    Sulfa (sulfonamide antibiotics) Itching       EXAM:  Constitutional  Vitals:  Most recent vital signs were reviewed.   Last 3 sets of VS:      3/29/2023     3:23 PM 8/9/2023     1:36 PM 12/20/2023     8:39 AM   Vitals - 1 value per visit   SYSTOLIC 120 115 127   DIASTOLIC 92 79 85   Pulse 80 77 79   Temp    "97.3 °F (36.3 °C)   Weight (lb) 149.2 151.24 152   Weight (kg) 67.677 68.6 68.947   Height 5' 4" (1.626 m) 5' 4" (1.626 m) 5' 4" (1.626 m)   BMI (Calculated) 25.6 25.9 26.1   Pain Score Zero Zero Zero      General:  unremarkable, age appropriate     Musculoskeletal  Muscle Strength/Tone:  No tremors appreciated   Gait & Station:  Pt seated during virtual visit     Psychiatric  Speech:  no latency; no press   Mood & Affect:  euthymic  congruent and appropriate   Thought Process:  normal and logical   Associations:  intact   Thought Content:  normal, no suicidality, no homicidality, delusions, or paranoia   Insight:  intact   Judgement: behavior is adequate to circumstances   Orientation:  grossly intact   Memory: intact for content of interview   Language: grossly intact   Attention Span & Concentration:  able to focus   Fund of Knowledge:  intact and appropriate to age and level of education     SUICIDE RISK ASSESSMENT:  Protective factors: age, gender, no prior attempts, no prior hospitalizations, no ongoing substance abuse, no psychosis, , has children, denies SI/intent/plan, seeking treatment, access to treatment, future oriented, good primary support, no access to firearms  Risks: hx ADHD  Patient is a low immediate and long-term risk considering risk factors.     RELEVANT LABS/STUDIES:    Lab Results   Component Value Date    WBC 6.01 12/20/2023    HGB 14.9 12/20/2023    HCT 44.7 12/20/2023    MCV 93 12/20/2023     12/20/2023     BMP  Lab Results   Component Value Date     12/20/2023    K 3.4 (L) 12/20/2023     12/20/2023    CO2 27 12/20/2023    BUN 17 12/20/2023    CREATININE 0.8 12/20/2023    CALCIUM 9.8 12/20/2023    ANIONGAP 11 12/20/2023    ESTGFRAFRICA >60.0 03/30/2022    EGFRNONAA >60.0 03/30/2022     Lab Results   Component Value Date    ALT 29 12/20/2023    AST 24 12/20/2023    ALKPHOS 93 12/20/2023    BILITOT 0.4 12/20/2023     Lab Results   Component Value Date    TSH 1.108 " 10/21/2022     Lab Results   Component Value Date    HGBA1C 5.4 03/29/2023       IMPRESSION:    Anna Marie Lindo is a 53 y.o. female with history of ADHD who presents for follow up appointment.    Status/Progress: Based on the examination today, the patient's problem(s) is/are improved and well controlled.  New problems have not been presented today.   Co-morbidities are not complicating management of the primary condition.  There are no active rule-out diagnoses for this patient at this time.     Risk Parameters:  Patient reports no suicidal ideation  Patient reports no homicidal ideation  Patient reports no self-injurious behavior  Patient reports no violent behavior    DIAGNOSES:  No diagnosis found.      PLAN:  Continue dextroamphetamine-amphetamine XR 30 mg  q.a.m. for ADHD  Continue  dextroamphetamine-amphetamine IR 10 mg after lunch p.r.n. breakthrough symptoms of ADHD      RETURN TO CLINIC:   3 months      VLADIMIR Bradford, PMHNP-BC    30 minutes of total time spent on the encounter, which includes face to face time and non-face to face time preparing to see the patient (eg. review of tests), obtaining and/or reviewing separately obtained history, documenting clinical information in the electronic health record, independently interpreting results (not separately reported), and communicating results to the patient/family/caregiver, or care coordination (not separately reported).     At this time there are no indications the patient represents an imminent danger to either themselves or others; will continue to manage treatment in the outpatient setting.    I discussed the patient's care with the patient including benefits, alternatives, possible adverse effects of the treatment plan; including the potential for metabolic complications, major organ dysfunction, black box warnings, and contraindications. The opportunity was given for questions/clarification, and after this discussion the above treatment plan  "was devised through shared decision making. The patient voiced their understanding of the diagnoses and treatments listed above and agreed to the treatment plan. Follow up plan was reviewed with the patient. The patient was advised to call to report any worsening of symptoms or problems with medication.    Supportive therapy and psychoeducation provided. Patient has been given crisis information including Suicide and Crisis Lifeline (call or text: 509). Patient also given instructions to go to the nearest ER or call 911 if unable to remain safe or if the Pt develops thoughts of harming self or others.    [facility]: Reviewed today to detect potential controlled substance misuse, diversion, excessive prescribing, or multiple providers prescribing controlled substances. The patients report was deemed appropriate without new medications of concern prescribed by other providers.    Documentation entered by me for this encounter may have been done in part using [employer] voice recognition transcription software. Garbled syntax, mangled pronouns, and other bizarre constructions may be attributed to that software system. Although I have made an effort to ensure accuracy, "sound like" errors may exist and should be interpreted in context.      "

## 2024-05-10 NOTE — PROGRESS NOTES
OUTPATIENT PSYCHIATRY FOLLOW UP VISIT    Encounter Date: 5/10/2024    Clinical Status of Patient:  Outpatient (Virtual)  The patient location is: [place], [place]  The patient phone number is: [phone]   Visit type: Virtual visit with synchronous audio and video  Each patient to whom he or she provides medical services by telemedicine is:  (1) informed of the relationship between the practitioner and patient and the respective role of any other health care provider with respect to management of the patient; and (2) notified that he or she may decline to receive medical services by telemedicine and may withdraw from such care at any time.    Chief Complaint:  Anna Marie Lindo is a 53 y.o. female who presents today for follow-up.  Met with patient.      HISTORY OF PRESENTING ILLNESS:  Anna Marie Lindo is a 53 y.o. female with history of ADHD-IT who presents for follow up appointment.      11/8/2023: Patient was seen at the [facility] on 03/24 for chest pain with negative cardiac workup. Denies cardiac symptoms in the interim. Believes the CP she experienced in March may have been related to a supplement she had recently started taking of the time.  Requesting to increase dose of dextroamphetamine-amphetamine. Reports afternoon breakthrough symptoms with difficulty concentrating beginning around 1400 which is affecting her occupational functioning. Also reports difficulty driving home in the evenings. Pt has experienced these symptoms for approximately 6 months; symptoms have not improved with time.    1/30/2024: Dextroamphetamine-amphetamine immediate release 10 mg Q afternoon was started at last visit to target afternoon breakthrough symptoms of inattention and concentration difficulty.  She has struggled to remember to take the afternoon dose, but setting an alarm to remind her to take it has helped.  She reports marked improvement in afternoon breakthrough symptoms and is now able to focus and  "concentrate throughout her work day.  She has no longer having difficulty driving home in the evenings due to inattention.   Enrolled in [employer] school for a bachelor's in business operations management. Started January 8th. Her employer is paying for approx half of tuition.   Mood is stable. "Everything else is great!"    Plan at last appointment:   Continue dextroamphetamine-amphetamine XR 30 mg  q.a.m. for ADHD  Continue  dextroamphetamine-amphetamine IR 10 mg after lunch p.r.n. breakthrough symptoms of ADHD    Psychotropic medication history:   Alprazolam      INTERVAL HISTORY:    Pt reports she is taking dextroamphetamine-amphetamine 10 mg IR in the AM and then takes 30 mg XR approximately 3-4 hours later.  She states the immediate release in the morning helps her to focus and get ready and leave her house an appropriate time.  "It calms me in the morning. It jumpstarts the other (XR) and I don't need it in the afternoon." Symptoms are well controlled throughout the day without side effects.  Patient states she is able to focus on her school work and fall asleep easily at night.    Denies anxiety. Mood is stable.    ADHD  Inattentive symptoms:  Controlled  Hyperactive symptoms:  Controlled  Behavior at home:  Stable  Behavior at work:  Stable and productive  Side effects:  Pt denies cardiovascular events including increased heart rate or increased blood pressure, palpitations, chest pain, dizziness, lightheadedness, or syncopal episodes. Pt denies A/V hallucinations or behavioral effects. Pt denies anorexia, decreased appetite, xerostomia, headache, insomnia, or digital changes.    No interval episodes with symptoms consistent with wilber or hypomania.  Denied interval or current suicidal/homicidal thoughts, intent, or plan or NSSI.  Denied other questions and concerns.  Patient reports feeling stable and wishing to continue current management unchanged.    Medication side effects: None  Medication adherence: " no    PSYCHIATRIC REVIEW OF SYSTEMS:  Is patient experiencing or having changes in:  Trouble with sleep: no  Appetite changes:  no  Weight changes:  no  Lack of energy:  no  Anhedonia:  no  Somatic symptoms:  no  Anxiety/panic:  no  Irritability: no  Guilty/hopeless:  no  Concentration: difficulty at baseline with hx ADHD, improved with dextroamphetamine-amphetamine  Racing thoughts: no  Impulsive behaviors: no  Paranoia/AVH: no  Self-injurious behavior/risky behavior:  no  Any drugs:  no  Alcohol:  no      MEDICAL REVIEW OF SYSTEMS:   Pain: Denies any significant chronic or acute pain.  Constitutional: Denies fever or change in appetite.  Cardiovascular: Denies chest pain or exertional dyspnea.  Respiratory: Reji cough or orthopnea.   GI: denies abdominal pain, denies N/V  Neurological: Denies tremor, seizure, or focal weakness.  Psychiatric: See HPI above.    PAST PSYCHIATRIC, MEDICAL, AND SOCIAL HISTORY REVIEWED  The patient's past medical, family and social history have been reviewed and updated as appropriate within the electronic medical record - see encounter notes.    PAST MEDICAL HISTORY:   Past Medical History:   Diagnosis Date    GERD (gastroesophageal reflux disease)     Hypokalemia     Head trauma/Loss of consciousness: denies  Seizures: denies     PAST PSYCHIATRIC HISTORY:  First psych contact:  Today, 2/23/22  Prior hospitalizations: denies  Prior suicide attempts or self-harm: denies  Prior diagnosis: denies  Prior psychotherapy: denies      MEDICATIONS:    Current Outpatient Medications:     CALCIUM-VITAMIN D3-MAGNESIUM ORAL, Take by mouth., Disp: , Rfl:     dextroamphetamine-amphetamine (ADDERALL XR) 30 MG 24 hr capsule, Take 1 capsule (30 mg total) by mouth every morning., Disp: 90 capsule, Rfl: 0    dextroamphetamine-amphetamine (ADDERALL) 10 mg Tab, Take 1 tablet (10 mg total) by mouth daily with lunch., Disp: 90 tablet, Rfl: 0    hydroCHLOROthiazide (HYDRODIURIL) 25 MG tablet, Take 1 tablet  "(25 mg total) by mouth once daily., Disp: 90 tablet, Rfl: 3    multivit-min/iron/FA/vit K/lut (CENTRUM MINIS WOMEN 50 PLUS ORAL), Take by mouth., Disp: , Rfl:     potassium chloride (MICRO-K) 10 MEQ CpSR, TAKE 3 CAPSULES BY MOUTH EVERY DAY, Disp: 270 capsule, Rfl: 3    ALLERGIES:  Review of patient's allergies indicates:   Allergen Reactions    Sulfa (sulfonamide antibiotics) Itching       EXAM:  Constitutional  Vitals:  Most recent vital signs were reviewed.   Last 3 sets of VS:      3/29/2023     3:23 PM 8/9/2023     1:36 PM 12/20/2023     8:39 AM   Vitals - 1 value per visit   SYSTOLIC 120 115 127   DIASTOLIC 92 79 85   Pulse 80 77 79   Temp   97.3 °F (36.3 °C)   Weight (lb) 149.2 151.24 152   Weight (kg) 67.677 68.6 68.947   Height 5' 4" (1.626 m) 5' 4" (1.626 m) 5' 4" (1.626 m)   BMI (Calculated) 25.6 25.9 26.1   Pain Score Zero Zero Zero      General:  unremarkable, age appropriate     Musculoskeletal  Muscle Strength/Tone:  No tremors appreciated   Gait & Station:  Pt seated during virtual visit     Psychiatric  Speech:  no latency; no press   Mood & Affect:  euthymic  congruent and appropriate   Thought Process:  normal and logical   Associations:  intact   Thought Content:  normal, no suicidality, no homicidality, delusions, or paranoia   Insight:  intact   Judgement: behavior is adequate to circumstances   Orientation:  grossly intact   Memory: intact for content of interview   Language: grossly intact   Attention Span & Concentration:  Intact to interview   Fund of Knowledge:  Not tested     SUICIDE RISK ASSESSMENT:  Protective factors: age, gender, no prior attempts, no prior hospitalizations, no ongoing substance abuse, no psychosis, , has children, denies SI/intent/plan, seeking treatment, access to treatment, future oriented, good primary support, no access to firearms  Risks: hx ADHD  Patient is a low immediate and long-term risk considering risk factors.     RELEVANT LABS/STUDIES:    Lab " Results   Component Value Date    WBC 6.01 12/20/2023    HGB 14.9 12/20/2023    HCT 44.7 12/20/2023    MCV 93 12/20/2023     12/20/2023     BMP  Lab Results   Component Value Date     12/20/2023    K 3.4 (L) 12/20/2023     12/20/2023    CO2 27 12/20/2023    BUN 17 12/20/2023    CREATININE 0.8 12/20/2023    CALCIUM 9.8 12/20/2023    ANIONGAP 11 12/20/2023    ESTGFRAFRICA >60.0 03/30/2022    EGFRNONAA >60.0 03/30/2022     Lab Results   Component Value Date    ALT 29 12/20/2023    AST 24 12/20/2023    ALKPHOS 93 12/20/2023    BILITOT 0.4 12/20/2023     Lab Results   Component Value Date    TSH 1.108 10/21/2022     Lab Results   Component Value Date    HGBA1C 5.4 03/29/2023       IMPRESSION:    Anna Marie Lindo is a 53 y.o. female with history of ADHD who presents for follow up appointment.    Status/Progress: Based on the examination today, the patient's problem(s) is/are improved and well controlled.  New problems have not been presented today.   Co-morbidities are not complicating management of the primary condition.  There are no active rule-out diagnoses for this patient at this time.     Risk Parameters:  Patient reports no suicidal ideation  Patient reports no homicidal ideation  Patient reports no self-injurious behavior  Patient reports no violent behavior    DIAGNOSES:    ICD-10-CM ICD-9-CM   1. ADHD (attention deficit hyperactivity disorder), inattentive type  F90.0 314.00       PLAN:  Continue dextroamphetamine-amphetamine IR 10 mg q.a.m. for ADHD  Continue dextroamphetamine-amphetamine XR 30 mg mid morning for ADHD      RETURN TO CLINIC:   3 months      VLADIMIR Bradford, PMHNP-BC    30 minutes of total time spent on the encounter, which includes face to face time and non-face to face time preparing to see the patient (eg. review of tests), obtaining and/or reviewing separately obtained history, documenting clinical information in the electronic health record, independently  "interpreting results (not separately reported), and communicating results to the patient/family/caregiver, or care coordination (not separately reported).     At this time there are no indications the patient represents an imminent danger to either themselves or others; will continue to manage treatment in the outpatient setting.    I discussed the patient's care with the patient including benefits, alternatives, possible adverse effects of the treatment plan; including the potential for metabolic complications, major organ dysfunction, black box warnings, and contraindications. The opportunity was given for questions/clarification, and after this discussion the above treatment plan was devised through shared decision making. The patient voiced their understanding of the diagnoses and treatments listed above and agreed to the treatment plan. Follow up plan was reviewed with the patient. The patient was advised to call to report any worsening of symptoms or problems with medication.    Supportive therapy and psychoeducation provided. Patient has been given crisis information including Suicide and Crisis Lifeline (call or text: 567). Patient also given instructions to go to the nearest ER or call 911 if unable to remain safe or if the Pt develops thoughts of harming self or others.    [facility]: Reviewed today to detect potential controlled substance misuse, diversion, excessive prescribing, or multiple providers prescribing controlled substances. The patients report was deemed appropriate without new medications of concern prescribed by other providers.    Documentation entered by me for this encounter may have been done in part using [employer] Direct voice recognition transcription software. Garbled syntax, mangled pronouns, and other bizarre constructions may be attributed to that software system. Although I have made an effort to ensure accuracy, "sound like" errors may exist and should be " interpreted in context.

## 2024-06-07 DIAGNOSIS — R60.9 EDEMA, UNSPECIFIED TYPE: ICD-10-CM

## 2024-06-07 NOTE — TELEPHONE ENCOUNTER
No care due was identified.  [facility] Embedded Care Due Messages. Reference number: [id number].   6/07/2024 2:18:20 PM CDT

## 2024-06-07 NOTE — TELEPHONE ENCOUNTER
Refill Routing Note   Medication(s) are not appropriate for processing by [facility] for the following reason(s):        Required labs abnormal    [facility] action(s):  Defer             Appointments  past 12m or future 3m with PCP    Date Provider   Last Visit   12/20/2023 Dacia King MD   Next Visit   Visit date not found Dacia King MD   ED visits in past 90 days: 0        Note composed:6:14 PM 06/07/2024

## 2024-06-10 RX ORDER — HYDROCHLOROTHIAZIDE 25 MG/1
25 TABLET ORAL DAILY
Qty: 90 TABLET | Refills: 3 | Status: SHIPPED | OUTPATIENT
Start: 2024-06-10

## 2024-08-26 ENCOUNTER — OFFICE VISIT (OUTPATIENT)
Dept: PSYCHIATRY | Facility: CLINIC | Age: 54
End: 2024-08-26
Payer: COMMERCIAL

## 2024-08-26 DIAGNOSIS — F90.0 ADHD (ATTENTION DEFICIT HYPERACTIVITY DISORDER), INATTENTIVE TYPE: ICD-10-CM

## 2024-08-26 PROCEDURE — 1159F MED LIST DOCD IN RCRD: CPT | Mod: CPTII,95,,

## 2024-08-26 PROCEDURE — 99213 OFFICE O/P EST LOW 20 MIN: CPT | Mod: 95,,,

## 2024-08-26 PROCEDURE — 1160F RVW MEDS BY RX/DR IN RCRD: CPT | Mod: CPTII,95,,

## 2024-08-26 RX ORDER — DEXTROAMPHETAMINE SACCHARATE, AMPHETAMINE ASPARTATE, DEXTROAMPHETAMINE SULFATE AND AMPHETAMINE SULFATE 2.5; 2.5; 2.5; 2.5 MG/1; MG/1; MG/1; MG/1
10 TABLET ORAL
Qty: 90 TABLET | Refills: 0 | Status: SHIPPED | OUTPATIENT
Start: 2024-08-26 | End: 2024-11-24

## 2024-08-26 RX ORDER — DEXTROAMPHETAMINE SACCHARATE, AMPHETAMINE ASPARTATE MONOHYDRATE, DEXTROAMPHETAMINE SULFATE AND AMPHETAMINE SULFATE 7.5; 7.5; 7.5; 7.5 MG/1; MG/1; MG/1; MG/1
30 CAPSULE, EXTENDED RELEASE ORAL EVERY MORNING
Qty: 90 CAPSULE | Refills: 0 | Status: SHIPPED | OUTPATIENT
Start: 2024-08-26 | End: 2024-11-24

## 2024-08-26 NOTE — PROGRESS NOTES
"OUTPATIENT PSYCHIATRY FOLLOW UP VISIT    Encounter Date: 8/26/2024    Clinical Status of Patient:  Outpatient (Virtual)  The patient location is: [place], [place]  The patient phone number is: [phone]   Visit type: Virtual visit with synchronous audio and video  Each patient to whom he or she provides medical services by telemedicine is:  (1) informed of the relationship between the practitioner and patient and the respective role of any other health care provider with respect to management of the patient; and (2) notified that he or she may decline to receive medical services by telemedicine and may withdraw from such care at any time.    Chief Complaint:  Anna Marie Lindo is a 53 y.o. female who presents today for follow-up.  Met with patient.      HISTORY OF PRESENTING ILLNESS:  Anna Marie Lindo is a 53 y.o. female with history of ADHD-IT who presents for follow up appointment.      Plan at last appointment:   Continue dextroamphetamine-amphetamine IR 10 mg q.a.m. for ADHD  Continue dextroamphetamine-amphetamine XR 30 mg mid morning for ADHD    Psychotropic medication history:   Alprazolam      INTERVAL HISTORY:    Symptoms of ADHD continue to be well controlled with current regimen. Pt takes dextroamphetamine-amphetamine 10 mg IR in the AM and then takes 30 mg XR approximately 3-4 hours later.  She states the immediate release in the morning helps her to focus and get ready and leave her house an appropriate time.  "It calms me in the morning. It jumpstarts the other (XR) and I don't need it in the afternoon." Symptoms are well controlled throughout the day without side effects.  Patient states she is able to focus on her school work and fall asleep easily at night.    Denies anxiety. Mood is stable.    No interval episodes with symptoms consistent with wilber or hypomania.  Denied interval or current suicidal/homicidal thoughts, intent, or plan or NSSI.  Denied other questions and concerns.  Patient reports " feeling stable and wishing to continue current management unchanged.    Medication side effects: None Pt denies cardiovascular events including increased heart rate or increased blood pressure, palpitations, chest pain, dizziness, lightheadedness, or syncopal episodes. Pt denies A/V hallucinations or behavioral effects. Pt denies anorexia, decreased appetite, xerostomia, headache, insomnia, or digital changes.  Medication adherence: no    PSYCHIATRIC REVIEW OF SYSTEMS:  Is patient experiencing or having changes in:  Trouble with sleep: no  Appetite changes:  no  Weight changes:  no  Lack of energy:  no  Anhedonia:  no  Somatic symptoms:  no  Anxiety/panic:  no  Irritability: no  Guilty/hopeless:  no  Concentration: difficulty at baseline with hx ADHD, improved with dextroamphetamine-amphetamine  Racing thoughts: no  Impulsive behaviors: no  Paranoia/AVH: no  Self-injurious behavior/risky behavior:  no  Any drugs:  no  Alcohol:  no      MEDICAL REVIEW OF SYSTEMS:   Pain: Denies any significant chronic or acute pain.  Constitutional: Denies fever or change in appetite.  Cardiovascular: Denies chest pain or exertional dyspnea.  Respiratory: Reji cough or orthopnea.   GI: denies abdominal pain, denies N/V  Neurological: Denies tremor, seizure, or focal weakness.  Psychiatric: See HPI above.    PAST PSYCHIATRIC, MEDICAL, AND SOCIAL HISTORY REVIEWED  The patient's past medical, family and social history have been reviewed and updated as appropriate within the electronic medical record - see encounter notes.    PAST MEDICAL HISTORY:   Past Medical History:   Diagnosis Date    GERD (gastroesophageal reflux disease)     Hypokalemia     Head trauma/Loss of consciousness: denies  Seizures: denies     PAST PSYCHIATRIC HISTORY:  First psych contact:  Today, 2/23/22  Prior hospitalizations: denies  Prior suicide attempts or self-harm: denies  Prior diagnosis: denies  Prior psychotherapy: denies      MEDICATIONS:    Current  "Outpatient Medications:     CALCIUM-VITAMIN D3-MAGNESIUM ORAL, Take by mouth., Disp: , Rfl:     dextroamphetamine-amphetamine (ADDERALL XR) 30 MG 24 hr capsule, Take 1 capsule (30 mg total) by mouth every morning., Disp: 90 capsule, Rfl: 0    hydroCHLOROthiazide (HYDRODIURIL) 25 MG tablet, Take 1 tablet (25 mg total) by mouth once daily., Disp: 90 tablet, Rfl: 3    multivit-min/iron/FA/vit K/lut (CENTRUM MINIS WOMEN 50 PLUS ORAL), Take by mouth., Disp: , Rfl:     potassium chloride (MICRO-K) 10 MEQ CpSR, TAKE 3 CAPSULES BY MOUTH EVERY DAY, Disp: 270 capsule, Rfl: 3    ALLERGIES:  Review of patient's allergies indicates:   Allergen Reactions    Sulfa (sulfonamide antibiotics) Itching       EXAM:  Constitutional  Vitals:  Most recent vital signs were reviewed.   Last 3 sets of VS:      3/29/2023     3:23 PM 8/9/2023     1:36 PM 12/20/2023     8:39 AM   Vitals - 1 value per visit   SYSTOLIC 120 115 127   DIASTOLIC 92 79 85   Pulse 80 77 79   Temp   97.3 °F (36.3 °C)   Weight (lb) 149.2 151.24 152   Weight (kg) 67.677 68.6 68.947   Height 5' 4" (1.626 m) 5' 4" (1.626 m) 5' 4" (1.626 m)   BMI (Calculated) 25.6 25.9 26.1   Pain Score Zero Zero Zero      General:  unremarkable, age appropriate     Musculoskeletal  Muscle Strength/Tone:  No tremors appreciated   Gait & Station:  Pt seated during virtual visit     Psychiatric  Speech:  no latency; no press   Mood & Affect:  euthymic  congruent and appropriate   Thought Process:  normal and logical   Associations:  intact   Thought Content:  normal, no suicidality, no homicidality, delusions, or paranoia   Insight:  intact   Judgement: behavior is adequate to circumstances   Orientation:  grossly intact   Memory: intact for content of interview   Language: grossly intact   Attention Span & Concentration:  Intact to interview   Fund of Knowledge:  Not tested     SUICIDE RISK ASSESSMENT:  Protective factors: age, gender, no prior attempts, no prior hospitalizations, no ongoing " substance abuse, no psychosis, , has children, denies SI/intent/plan, seeking treatment, access to treatment, future oriented, good primary support, no access to firearms  Risks: hx ADHD  Patient is a low immediate and long-term risk considering risk factors.     RELEVANT LABS/STUDIES:    Lab Results   Component Value Date    WBC 6.01 12/20/2023    HGB 14.9 12/20/2023    HCT 44.7 12/20/2023    MCV 93 12/20/2023     12/20/2023     BMP  Lab Results   Component Value Date     12/20/2023    K 3.4 (L) 12/20/2023     12/20/2023    CO2 27 12/20/2023    BUN 17 12/20/2023    CREATININE 0.8 12/20/2023    CALCIUM 9.8 12/20/2023    ANIONGAP 11 12/20/2023    ESTGFRAFRICA >60.0 03/30/2022    EGFRNONAA >60.0 03/30/2022     Lab Results   Component Value Date    ALT 29 12/20/2023    AST 24 12/20/2023    ALKPHOS 93 12/20/2023    BILITOT 0.4 12/20/2023     Lab Results   Component Value Date    TSH 1.108 10/21/2022     Lab Results   Component Value Date    HGBA1C 5.4 03/29/2023       IMPRESSION:    Anna Marie Lindo is a 53 y.o. female with history of ADHD who presents for follow up appointment.    Status/Progress: Based on the examination today, the patient's problem(s) is/are improved and well controlled.  New problems have not been presented today.   Co-morbidities are not complicating management of the primary condition.  There are no active rule-out diagnoses for this patient at this time.     Risk Parameters:  Patient reports no suicidal ideation  Patient reports no homicidal ideation  Patient reports no self-injurious behavior  Patient reports no violent behavior    DIAGNOSES:    ICD-10-CM ICD-9-CM   1. ADHD (attention deficit hyperactivity disorder), inattentive type  F90.0 314.00       PLAN:  Continue dextroamphetamine-amphetamine IR 10 mg q.a.m. for ADHD  Continue dextroamphetamine-amphetamine XR 30 mg mid morning for ADHD      RETURN TO CLINIC:   1 month in person      VLADIMIR Bradford,  PMHNP-BC    20 minutes of total time spent on the encounter, which includes face to face time and non-face to face time preparing to see the patient (eg. review of tests), obtaining and/or reviewing separately obtained history, documenting clinical information in the electronic health record, independently interpreting results (not separately reported), and communicating results to the patient/family/caregiver, or care coordination (not separately reported).     At this time there are no indications the patient represents an imminent danger to either themselves or others; will continue to manage treatment in the outpatient setting.    I discussed the patient's care with the patient including benefits, alternatives, possible adverse effects of the treatment plan; including the potential for metabolic complications, major organ dysfunction, black box warnings, and contraindications. The opportunity was given for questions/clarification, and after this discussion the above treatment plan was devised through shared decision making. The patient voiced their understanding of the diagnoses and treatments listed above and agreed to the treatment plan. Follow up plan was reviewed with the patient. The patient was advised to call to report any worsening of symptoms or problems with medication.    Supportive therapy and psychoeducation provided. Patient has been given crisis information including Suicide and Crisis Lifeline (call or text: 968). Patient also given instructions to go to the nearest ER or call 911 if unable to remain safe or if the Pt develops thoughts of harming self or others.    [facility]: Reviewed today to detect potential controlled substance misuse, diversion, excessive prescribing, or multiple providers prescribing controlled substances. The patients report was deemed appropriate without new medications of concern prescribed by other providers.    Documentation entered by me for this encounter  "may have been done in part using [employer] Direct voice recognition transcription software. Garbled syntax, mangled pronouns, and other bizarre constructions may be attributed to that software system. Although I have made an effort to ensure accuracy, "sound like" errors may exist and should be interpreted in context.    "

## 2024-09-27 ENCOUNTER — OFFICE VISIT (OUTPATIENT)
Dept: PSYCHIATRY | Facility: CLINIC | Age: 54
End: 2024-09-27
Payer: COMMERCIAL

## 2024-09-27 VITALS
SYSTOLIC BLOOD PRESSURE: 111 MMHG | DIASTOLIC BLOOD PRESSURE: 76 MMHG | WEIGHT: 145.31 LBS | BODY MASS INDEX: 24.81 KG/M2 | HEART RATE: 91 BPM | HEIGHT: 64 IN

## 2024-09-27 DIAGNOSIS — F90.0 ADHD (ATTENTION DEFICIT HYPERACTIVITY DISORDER), INATTENTIVE TYPE: ICD-10-CM

## 2024-09-27 PROCEDURE — 99999 PR PBB SHADOW E&M-EST. PATIENT-LVL III: CPT | Mod: PBBFAC,,,

## 2024-09-27 PROCEDURE — 3008F BODY MASS INDEX DOCD: CPT | Mod: CPTII,S$GLB,,

## 2024-09-27 PROCEDURE — 99213 OFFICE O/P EST LOW 20 MIN: CPT | Mod: S$GLB,,,

## 2024-09-27 PROCEDURE — 1159F MED LIST DOCD IN RCRD: CPT | Mod: CPTII,S$GLB,,

## 2024-09-27 PROCEDURE — 1160F RVW MEDS BY RX/DR IN RCRD: CPT | Mod: CPTII,S$GLB,,

## 2024-09-27 PROCEDURE — 3078F DIAST BP <80 MM HG: CPT | Mod: CPTII,S$GLB,,

## 2024-09-27 PROCEDURE — G2211 COMPLEX E/M VISIT ADD ON: HCPCS | Mod: S$GLB,,,

## 2024-09-27 PROCEDURE — 3074F SYST BP LT 130 MM HG: CPT | Mod: CPTII,S$GLB,,

## 2024-09-27 RX ORDER — DEXTROAMPHETAMINE SACCHARATE, AMPHETAMINE ASPARTATE MONOHYDRATE, DEXTROAMPHETAMINE SULFATE AND AMPHETAMINE SULFATE 7.5; 7.5; 7.5; 7.5 MG/1; MG/1; MG/1; MG/1
30 CAPSULE, EXTENDED RELEASE ORAL EVERY MORNING
Qty: 90 CAPSULE | Refills: 0 | Status: SHIPPED | OUTPATIENT
Start: 2024-11-26 | End: 2025-02-24

## 2024-09-27 RX ORDER — DEXTROAMPHETAMINE SACCHARATE, AMPHETAMINE ASPARTATE, DEXTROAMPHETAMINE SULFATE AND AMPHETAMINE SULFATE 2.5; 2.5; 2.5; 2.5 MG/1; MG/1; MG/1; MG/1
10 TABLET ORAL
Qty: 90 TABLET | Refills: 0 | Status: SHIPPED | OUTPATIENT
Start: 2024-10-27 | End: 2025-01-25

## 2024-09-27 RX ORDER — DEXTROAMPHETAMINE SACCHARATE, AMPHETAMINE ASPARTATE, DEXTROAMPHETAMINE SULFATE AND AMPHETAMINE SULFATE 2.5; 2.5; 2.5; 2.5 MG/1; MG/1; MG/1; MG/1
10 TABLET ORAL
Qty: 90 TABLET | Refills: 0 | Status: SHIPPED | OUTPATIENT
Start: 2024-09-27 | End: 2024-12-26

## 2024-09-27 RX ORDER — DEXTROAMPHETAMINE SACCHARATE, AMPHETAMINE ASPARTATE, DEXTROAMPHETAMINE SULFATE AND AMPHETAMINE SULFATE 2.5; 2.5; 2.5; 2.5 MG/1; MG/1; MG/1; MG/1
10 TABLET ORAL
Qty: 90 TABLET | Refills: 0 | Status: SHIPPED | OUTPATIENT
Start: 2024-11-26 | End: 2025-02-24

## 2024-09-27 RX ORDER — DEXTROAMPHETAMINE SACCHARATE, AMPHETAMINE ASPARTATE MONOHYDRATE, DEXTROAMPHETAMINE SULFATE AND AMPHETAMINE SULFATE 7.5; 7.5; 7.5; 7.5 MG/1; MG/1; MG/1; MG/1
30 CAPSULE, EXTENDED RELEASE ORAL EVERY MORNING
Qty: 90 CAPSULE | Refills: 0 | Status: SHIPPED | OUTPATIENT
Start: 2024-10-27 | End: 2025-01-25

## 2024-09-27 RX ORDER — DEXTROAMPHETAMINE SACCHARATE, AMPHETAMINE ASPARTATE MONOHYDRATE, DEXTROAMPHETAMINE SULFATE AND AMPHETAMINE SULFATE 7.5; 7.5; 7.5; 7.5 MG/1; MG/1; MG/1; MG/1
30 CAPSULE, EXTENDED RELEASE ORAL EVERY MORNING
Qty: 90 CAPSULE | Refills: 0 | Status: SHIPPED | OUTPATIENT
Start: 2024-09-27 | End: 2024-12-26

## 2024-09-27 NOTE — PROGRESS NOTES
"OUTPATIENT PSYCHIATRY FOLLOW UP VISIT    Encounter Date: 9/27/2024    Clinical Status of Patient:  Outpatient (Ambulatory)    Chief Complaint:  Anna Marie Lindo is a 54 y.o. female who presents today for follow-up.  Met with patient.      HISTORY OF PRESENTING ILLNESS:  Anna Marie Lindo is a 54 y.o. female with history of ADHD-IT who presents for follow up appointment.      Plan at last appointment:   Continue dextroamphetamine-amphetamine IR 10 mg q.a.m. for ADHD  Continue dextroamphetamine-amphetamine XR 30 mg mid morning for ADHD    Psychotropic medication history:   Alprazolam      INTERVAL HISTORY:    Mood continues to be stable. Pt denies anxiety or worry.    Symptoms of ADHD continue to be well controlled with current regimen. Pt takes dextroamphetamine-amphetamine 10 mg IR in the AM and then takes 30 mg XR approximately 3-4 hours later.  She states the immediate release in the morning helps her to focus and get ready and leave her house an appropriate time.  "It calms me in the morning. It jumpstarts the other (XR) and I don't need it in the afternoon." Symptoms are well controlled throughout the day without side effects.  Patient states she is able to focus on her school work and fall asleep easily at night.  School is going well.     Is patient experiencing or having changes in:  Trouble with sleep: no  Appetite changes:  no  Weight changes:  no  Lack of energy:  no  Anhedonia:  no  Somatic symptoms:  no  Anxiety/panic:  no  Irritability: no  Guilty/hopeless:  no  Racing thoughts: no  Impulsive behaviors: no  Paranoia/AVH: no  Self-injurious behavior/risky behavior:  no  Any drugs:  no  Alcohol:  no    No interval episodes with symptoms consistent with wilber or hypomania.  Denied interval or current suicidal/homicidal thoughts, intent, or plan or NSSI.  Denied other questions and concerns.  Patient reports feeling stable and wishing to continue current management unchanged.    Medication side effects: None " Pt denies cardiovascular events including increased heart rate or increased blood pressure, palpitations, chest pain, dizziness, lightheadedness, or syncopal episodes. Pt denies A/V hallucinations or behavioral effects. Pt denies anorexia, decreased appetite, xerostomia, headache, insomnia, or digital changes.  Medication adherence: no    MEDICAL REVIEW OF SYSTEMS:   Pain: Denies any significant chronic or acute pain.  Constitutional: Denies fever or change in appetite.  Cardiovascular: Denies chest pain or exertional dyspnea.  Respiratory: Reji cough or orthopnea.   GI: denies abdominal pain, denies N/V  Neurological: Denies tremor, seizure, or focal weakness.  Psychiatric: See HPI above.    PAST PSYCHIATRIC, MEDICAL, AND SOCIAL HISTORY REVIEWED  The patient's past medical, family and social history have been reviewed and updated as appropriate within the electronic medical record - see encounter notes.    PAST MEDICAL HISTORY:   Past Medical History:   Diagnosis Date    GERD (gastroesophageal reflux disease)     Hypokalemia     Head trauma/Loss of consciousness: denies  Seizures: denies     PAST PSYCHIATRIC HISTORY:  First psych contact:  Today, 2/23/22  Prior hospitalizations: denies  Prior suicide attempts or self-harm: denies  Prior diagnosis: denies  Prior psychotherapy: denies      MEDICATIONS:    Current Outpatient Medications:     dextroamphetamine-amphetamine (ADDERALL XR) 30 MG 24 hr capsule, Take 1 capsule (30 mg total) by mouth every morning., Disp: 90 capsule, Rfl: 0    [START ON 10/27/2024] dextroamphetamine-amphetamine (ADDERALL XR) 30 MG 24 hr capsule, Take 1 capsule (30 mg total) by mouth every morning., Disp: 90 capsule, Rfl: 0    [START ON 11/26/2024] dextroamphetamine-amphetamine (ADDERALL XR) 30 MG 24 hr capsule, Take 1 capsule (30 mg total) by mouth every morning., Disp: 90 capsule, Rfl: 0    dextroamphetamine-amphetamine (ADDERALL) 10 mg Tab, Take 1 tablet (10 mg total) by mouth daily with  "lunch., Disp: 90 tablet, Rfl: 0    [START ON 10/27/2024] dextroamphetamine-amphetamine (ADDERALL) 10 mg Tab, Take 1 tablet (10 mg total) by mouth daily with lunch., Disp: 90 tablet, Rfl: 0    [START ON 11/26/2024] dextroamphetamine-amphetamine (ADDERALL) 10 mg Tab, Take 1 tablet (10 mg total) by mouth daily with lunch., Disp: 90 tablet, Rfl: 0    hydroCHLOROthiazide (HYDRODIURIL) 25 MG tablet, Take 1 tablet (25 mg total) by mouth once daily., Disp: 90 tablet, Rfl: 3    multivit-min/iron/FA/vit K/lut (CENTRUM MINIS WOMEN 50 PLUS ORAL), Take by mouth., Disp: , Rfl:     potassium chloride (MICRO-K) 10 MEQ CpSR, TAKE 3 CAPSULES BY MOUTH EVERY DAY, Disp: 270 capsule, Rfl: 3    ALLERGIES:  Review of patient's allergies indicates:   Allergen Reactions    Sulfa (sulfonamide antibiotics) Itching       EXAM:  Constitutional  Vitals:  Most recent vital signs were reviewed.   Last 3 sets of VS:      8/9/2023     1:36 PM 12/20/2023     8:39 AM 9/27/2024     1:08 PM   Vitals - 1 value per visit   SYSTOLIC 115 127 111   DIASTOLIC 79 85 76   Pulse 77 79 91   Temp  97.3 °F (36.3 °C)    Weight (lb) 151.24 152 145.28   Weight (kg) 68.6 68.947 65.9   Height 5' 4" (1.626 m) 5' 4" (1.626 m) 5' 4" (1.626 m)   BMI (Calculated) 25.9 26.1 24.9   Pain Score Zero Zero Zero      General:  unremarkable, age appropriate     Musculoskeletal  Muscle Strength/Tone:  no tremor or abnormal movements   Gait & Station:  Steady, non-ataxic     Psychiatric  Speech:  no latency; no press   Mood & Affect:  euthymic  congruent and appropriate   Thought Process:  normal and logical   Associations:  intact   Thought Content:  normal, no suicidality, no homicidality, delusions, or paranoia   Insight:  intact   Judgement: behavior is adequate to circumstances   Orientation:  grossly intact   Memory: intact for content of interview   Language: grossly intact   Attention Span & Concentration:  Intact to interview   Fund of Knowledge:  Not tested     SUICIDE RISK " ASSESSMENT:  Protective factors: age, gender, no prior attempts, no prior hospitalizations, no ongoing substance abuse, no psychosis, , has children, denies SI/intent/plan, seeking treatment, access to treatment, future oriented, good primary support, no access to firearms  Risks: hx ADHD  Patient is a low immediate and long-term risk considering risk factors.     RELEVANT LABS/STUDIES:    Lab Results   Component Value Date    WBC 6.01 12/20/2023    HGB 14.9 12/20/2023    HCT 44.7 12/20/2023    MCV 93 12/20/2023     12/20/2023     BMP  Lab Results   Component Value Date     12/20/2023    K 3.4 (L) 12/20/2023     12/20/2023    CO2 27 12/20/2023    BUN 17 12/20/2023    CREATININE 0.8 12/20/2023    CALCIUM 9.8 12/20/2023    ANIONGAP 11 12/20/2023    ESTGFRAFRICA >60.0 03/30/2022    EGFRNONAA >60.0 03/30/2022     Lab Results   Component Value Date    ALT 29 12/20/2023    AST 24 12/20/2023    ALKPHOS 93 12/20/2023    BILITOT 0.4 12/20/2023     Lab Results   Component Value Date    TSH 1.108 10/21/2022     Lab Results   Component Value Date    HGBA1C 5.4 03/29/2023       IMPRESSION:    Anna Marie Lindo is a 54 y.o. female with history of ADHD who presents for follow up appointment.    Status/Progress: Based on the examination today, the patient's problem(s) is/are well controlled.  New problems have not been presented today.   Co-morbidities are not complicating management of the primary condition.  There are no active rule-out diagnoses for this patient at this time.     Risk Parameters:  Patient reports no suicidal ideation  Patient reports no homicidal ideation  Patient reports no self-injurious behavior  Patient reports no violent behavior    DIAGNOSES:    ICD-10-CM ICD-9-CM   1. ADHD (attention deficit hyperactivity disorder), inattentive type  F90.0 314.00       PLAN:  Continue dextroamphetamine-amphetamine IR 10 mg q.a.m. for ADHD  Continue dextroamphetamine-amphetamine XR 30 mg mid morning  for ADHD      RETURN TO CLINIC:   1 month in person      Yessica Cutler, VLADIMIR, PMHNP-BC    20 minutes of total time spent on the encounter, which includes face to face time and non-face to face time preparing to see the patient (eg. review of tests), obtaining and/or reviewing separately obtained history, documenting clinical information in the electronic health record, independently interpreting results (not separately reported), and communicating results to the patient/family/caregiver, or care coordination (not separately reported).     Visit today included managing the longitudinal care of the patient due to the serious and/or complex managed problem(s) ADHD.    At this time there are no indications the patient represents an imminent danger to either themselves or others; will continue to manage treatment in the outpatient setting.    I discussed the patient's care with the patient including benefits, alternatives, possible adverse effects of the treatment plan; including the potential for metabolic complications, major organ dysfunction, black box warnings, and contraindications. The opportunity was given for questions/clarification, and after this discussion the above treatment plan was devised through shared decision making. The patient voiced their understanding of the diagnoses and treatments listed above and agreed to the treatment plan. Follow up plan was reviewed with the patient. The patient was advised to call to report any worsening of symptoms or problems with medication.    Supportive therapy and psychoeducation provided. Patient has been given crisis information including Suicide and Crisis Lifeline (call or text: 112). Patient also given instructions to go to the nearest ER or call 911 if unable to remain safe or if the Pt develops thoughts of harming self or others.    [facility]: Reviewed today to detect potential controlled substance misuse, diversion, excessive prescribing, or  "multiple providers prescribing controlled substances. The patients report was deemed appropriate without new medications of concern prescribed by other providers.    Documentation entered by me for this encounter may have been done in part using [employer] Direct voice recognition transcription software. Garbled syntax, mangled pronouns, and other bizarre constructions may be attributed to that software system. Although I have made an effort to ensure accuracy, "sound like" errors may exist and should be interpreted in context.  "

## 2024-10-11 ENCOUNTER — OCCUPATIONAL HEALTH (OUTPATIENT)
Dept: URGENT CARE | Facility: CLINIC | Age: 54
End: 2024-10-11

## 2024-10-11 DIAGNOSIS — Z00.00 ENCOUNTER FOR PHYSICAL EXAMINATION: Primary | ICD-10-CM

## 2024-10-12 LAB
ALBUMIN SERPL-MCNC: 4 G/DL (ref 3.8–4.9)
ALP SERPL-CCNC: 81 IU/L (ref 44–121)
ALT SERPL-CCNC: 22 IU/L (ref 0–32)
APPEARANCE UR: CLEAR
AST SERPL-CCNC: 17 IU/L (ref 0–40)
BACTERIA #/AREA URNS HPF: ABNORMAL /[HPF]
BASOPHILS # BLD AUTO: 0 X10E3/UL (ref 0–0.2)
BASOPHILS NFR BLD AUTO: 1 %
BILIRUB SERPL-MCNC: 0.2 MG/DL (ref 0–1.2)
BILIRUB UR QL STRIP: NEGATIVE
BUN SERPL-MCNC: 12 MG/DL (ref 6–24)
BUN/CREAT SERPL: 16 (ref 9–23)
CALCIUM SERPL-MCNC: 8.9 MG/DL (ref 8.7–10.2)
CASTS URNS QL MICRO: ABNORMAL /LPF
CHLORIDE SERPL-SCNC: 106 MMOL/L (ref 96–106)
CO2 SERPL-SCNC: 24 MMOL/L (ref 20–29)
COLOR UR: YELLOW
CREAT SERPL-MCNC: 0.73 MG/DL (ref 0.57–1)
EOSINOPHIL # BLD AUTO: 0.1 X10E3/UL (ref 0–0.4)
EOSINOPHIL NFR BLD AUTO: 1 %
EPI CELLS #/AREA URNS HPF: >10 /HPF (ref 0–10)
ERYTHROCYTE [DISTWIDTH] IN BLOOD BY AUTOMATED COUNT: 12 % (ref 11.7–15.4)
EST. GFR  (NO RACE VARIABLE): 98 ML/MIN/1.73
GLOBULIN SER CALC-MCNC: 2.4 G/DL (ref 1.5–4.5)
GLUCOSE SERPL-MCNC: 94 MG/DL (ref 70–99)
GLUCOSE UR QL STRIP: NEGATIVE
HBA1C MFR BLD: 5.4 % (ref 4.8–5.6)
HCT VFR BLD AUTO: 43.4 % (ref 34–46.6)
HGB BLD-MCNC: 14.3 G/DL (ref 11.1–15.9)
HGB UR QL STRIP: NEGATIVE
IMM GRANULOCYTES # BLD AUTO: 0 X10E3/UL (ref 0–0.1)
IMM GRANULOCYTES NFR BLD AUTO: 0 %
KETONES UR QL STRIP: ABNORMAL
LEUKOCYTE ESTERASE UR QL STRIP: ABNORMAL
LYMPHOCYTES # BLD AUTO: 2.2 X10E3/UL (ref 0.7–3.1)
LYMPHOCYTES NFR BLD AUTO: 39 %
MCH RBC QN AUTO: 30.9 PG (ref 26.6–33)
MCHC RBC AUTO-ENTMCNC: 32.9 G/DL (ref 31.5–35.7)
MCV RBC AUTO: 94 FL (ref 79–97)
MICRO URNS: ABNORMAL
MONOCYTES # BLD AUTO: 0.4 X10E3/UL (ref 0.1–0.9)
MONOCYTES NFR BLD AUTO: 7 %
NEUTROPHILS # BLD AUTO: 2.9 X10E3/UL (ref 1.4–7)
NEUTROPHILS NFR BLD AUTO: 52 %
NITRITE UR QL STRIP: NEGATIVE
PH UR STRIP: 6 [PH] (ref 5–7.5)
PLATELET # BLD AUTO: 306 X10E3/UL (ref 150–450)
POTASSIUM SERPL-SCNC: 4 MMOL/L (ref 3.5–5.2)
PROT SERPL-MCNC: 6.4 G/DL (ref 6–8.5)
PROT UR QL STRIP: ABNORMAL
RBC # BLD AUTO: 4.63 X10E6/UL (ref 3.77–5.28)
RBC #/AREA URNS HPF: ABNORMAL /HPF (ref 0–2)
SODIUM SERPL-SCNC: 142 MMOL/L (ref 134–144)
SP GR UR STRIP: 1.02 (ref 1–1.03)
TSH SERPL DL<=0.005 MIU/L-ACNC: 1.01 UIU/ML (ref 0.45–4.5)
UROBILINOGEN UR STRIP-MCNC: 0.2 MG/DL (ref 0.2–1)
WBC # BLD AUTO: 5.5 X10E3/UL (ref 3.4–10.8)
WBC #/AREA URNS HPF: ABNORMAL /HPF (ref 0–5)

## 2024-11-19 ENCOUNTER — PATIENT MESSAGE (OUTPATIENT)
Dept: ADMINISTRATIVE | Facility: HOSPITAL | Age: 54
End: 2024-11-19
Payer: COMMERCIAL

## 2024-11-21 ENCOUNTER — PATIENT OUTREACH (OUTPATIENT)
Dept: ADMINISTRATIVE | Facility: HOSPITAL | Age: 54
End: 2024-11-21
Payer: COMMERCIAL

## 2024-11-21 NOTE — LETTER
AUTHORIZATION FOR RELEASE OF   CONFIDENTIAL INFORMATION    Dear Cj Clements,    We are seeing Anna Marie Lindo, date of birth 1970, in the clinic at [facility]. Dacia King MD is the patient's PCP. Anna Marie Lindo has an outstanding lab/procedure at the time we reviewed her chart. In order to help keep her health information updated, she has authorized us to request the following medical record(s):        (x)  MAMMOGRAM                                      (  )  COLONOSCOPY      (  )  PAP SMEAR                                          (  )  OUTSIDE LAB RESULTS     (  )  DEXA SCAN                                          (  )  EYE EXAM            (  )  FOOT EXAM                                          (  )  ENTIRE RECORD     (  )  OUTSIDE IMMUNIZATIONS                 (  )  _______________         Please fax records to [facility] Coordination @ [phone].   If you have any questions, please contact Leola Pimentel [facility] Care Coordinator  [phone]            Patient Name: Anna Marie Lindo  : 1970  Patient Phone #: [phone]

## 2024-11-21 NOTE — PROGRESS NOTES
Replying to Campaign Questionnaire for Overdue HM:  MAMMOGRAM     ABELINO sent to [facility] for mammogram.  Reminder set x 2 weeks.

## 2024-12-30 ENCOUNTER — OFFICE VISIT (OUTPATIENT)
Dept: PSYCHIATRY | Facility: CLINIC | Age: 54
End: 2024-12-30
Payer: COMMERCIAL

## 2024-12-30 DIAGNOSIS — F90.0 ADHD (ATTENTION DEFICIT HYPERACTIVITY DISORDER), INATTENTIVE TYPE: ICD-10-CM

## 2024-12-30 PROCEDURE — 99213 OFFICE O/P EST LOW 20 MIN: CPT | Mod: 95,,,

## 2024-12-30 PROCEDURE — 1160F RVW MEDS BY RX/DR IN RCRD: CPT | Mod: CPTII,95,,

## 2024-12-30 PROCEDURE — 3044F HG A1C LEVEL LT 7.0%: CPT | Mod: CPTII,95,,

## 2024-12-30 PROCEDURE — 1159F MED LIST DOCD IN RCRD: CPT | Mod: CPTII,95,,

## 2024-12-30 PROCEDURE — G2211 COMPLEX E/M VISIT ADD ON: HCPCS | Mod: 95,,,

## 2024-12-30 NOTE — PROGRESS NOTES
"OUTPATIENT PSYCHIATRY FOLLOW UP VISIT    Encounter Date: 12/30/2024    Clinical Status of Patient:  Outpatient (Virtual)  The patient location is: [facility] [place] [address]  The patient phone number is: [phone]   Visit type: Virtual visit with synchronous audio and video  Each patient to whom he or she provides medical services by telemedicine is:  (1) informed of the relationship between the practitioner and patient and the respective role of any other health care provider with respect to management of the patient; and (2) notified that he or she may decline to receive medical services by telemedicine and may withdraw from such care at any time.    Chief Complaint:  Anna Marie Lindo is a 54 y.o. female who presents today for follow-up.  Met with patient.      HISTORY OF PRESENTING ILLNESS:  Anna Marie Lindo is a 54 y.o. female with history of ADHD-IT who presents for follow up appointment.      Plan at last appointment:   Continue dextroamphetamine-amphetamine IR 10 mg q.a.m. for ADHD  Continue dextroamphetamine-amphetamine XR 30 mg mid morning for ADHD    Psychotropic medication history:   Alprazolam      INTERVAL HISTORY:    Symptoms of ADHD continue to be well controlled with current regimen. Pt takes dextroamphetamine-amphetamine 10 mg IR in the AM and then takes 30 mg XR approximately 3-4 hours later.  IR in the AM helps her to focus and get ready and leave her house an appropriate time.  "It calms me in the morning. It jumpstarts the other (XR) and I don't need it in the afternoon." Symptoms are well controlled throughout the day without side effects.  Patient states she is able to focus on her school work and fall asleep easily at night.  School is going well, making straight A's. Will graduate in 2026.     Pt reports her mood continues to be stable.    Is patient experiencing or having changes in:  Trouble with sleep: no  Appetite changes:  no  Weight changes:  no  Lack of energy:  " no  Anhedonia:  no  Somatic symptoms:  no  Anxiety/panic:  no  Irritability: no  Guilty/hopeless:  no  Racing thoughts: no  Impulsive behaviors: no  Paranoia/AVH: no  Self-injurious behavior/risky behavior:  no  Any drugs:  no  Alcohol:  no    No interval episodes with symptoms consistent with wilber or hypomania.  Denied interval or current suicidal/homicidal thoughts, intent, or plan or NSSI.  Denied other questions and concerns.  Patient reports feeling stable and wishing to continue current management unchanged.    Medication side effects: None Pt denies cardiovascular events including increased heart rate or increased blood pressure, palpitations, chest pain, dizziness, lightheadedness, or syncopal episodes. Pt denies A/V hallucinations or behavioral effects. Pt denies anorexia, decreased appetite, xerostomia, headache, insomnia, or digital changes.  Medication adherence: no    MEDICAL REVIEW OF SYSTEMS:   Pain: Denies any significant chronic or acute pain.  Constitutional: Denies fever or change in appetite.  Cardiovascular: Denies chest pain or exertional dyspnea.  Respiratory: Rjei cough or orthopnea.   GI: denies abdominal pain, denies N/V  Neurological: Denies tremor, seizure, or focal weakness.  Psychiatric: See HPI above.    PAST PSYCHIATRIC, MEDICAL, AND SOCIAL HISTORY REVIEWED  The patient's past medical, family and social history have been reviewed and updated as appropriate within the electronic medical record - see encounter notes.    PAST MEDICAL HISTORY:   Past Medical History:   Diagnosis Date    GERD (gastroesophageal reflux disease)     Hypokalemia     Head trauma/Loss of consciousness: denies  Seizures: denies     PAST PSYCHIATRIC HISTORY:  First psych contact:  Today, 2/23/22  Prior hospitalizations: denies  Prior suicide attempts or self-harm: denies  Prior diagnosis: denies  Prior psychotherapy: denies    ALLERGIES:  Review of patient's allergies indicates:   Allergen Reactions    Sulfa  "(sulfonamide antibiotics) Itching       EXAM:  Constitutional  Vitals:  Most recent vital signs were reviewed.   Last 3 sets of VS:      8/9/2023     1:36 PM 12/20/2023     8:39 AM 9/27/2024     1:08 PM   Vitals - 1 value per visit   SYSTOLIC 115 127 111   DIASTOLIC 79 85 76   Pulse 77 79 91   Temp  97.3 °F (36.3 °C)    Weight (lb) 151.24 152 145.28   Weight (kg) 68.6 68.947 65.9   Height 5' 4" (1.626 m) 5' 4" (1.626 m) 5' 4" (1.626 m)   BMI (Calculated) 25.9 26.1 24.9   Pain Score Zero Zero Zero      General:  unremarkable, age appropriate     Musculoskeletal  Muscle Strength/Tone:  No tremors appreciated   Gait & Station:  Pt seated during virtual visit     Psychiatric  Speech:  no latency; no press   Mood & Affect:  euthymic  congruent and appropriate   Thought Process:  normal and logical   Associations:  intact   Thought Content:  normal, no suicidality, no homicidality, delusions, or paranoia   Insight:  intact   Judgement: behavior is adequate to circumstances   Orientation:  grossly intact   Memory: intact for content of interview   Language: grossly intact   Attention Span & Concentration:  Intact to interview   Fund of Knowledge:  Not tested     SUICIDE RISK ASSESSMENT:  Protective factors: age, gender, no prior attempts, no prior hospitalizations, no ongoing substance abuse, no psychosis, , has children, denies SI/intent/plan, seeking treatment, access to treatment, future oriented, good primary support, no access to firearms  Risks: hx ADHD  Patient is a low immediate and long-term risk considering risk factors.     RELEVANT LABS/STUDIES:    Lab Results   Component Value Date    WBC 5.5 10/11/2024    HGB 14.3 10/11/2024    HCT 43.4 10/11/2024    MCV 94 10/11/2024     10/11/2024     BMP  Lab Results   Component Value Date     10/11/2024    K 4.0 10/11/2024     10/11/2024    CO2 24 10/11/2024    BUN 12 10/11/2024    CREATININE 0.73 10/11/2024    CALCIUM 8.9 10/11/2024    ANIONGAP " 11 12/20/2023    ESTGFRAFRICA >60.0 03/30/2022    EGFRNONAA >60.0 03/30/2022     Lab Results   Component Value Date    ALT 22 10/11/2024    AST 17 10/11/2024    ALKPHOS 93 12/20/2023    BILITOT 0.2 10/11/2024     Lab Results   Component Value Date    TSH 1.010 10/11/2024     Lab Results   Component Value Date    HGBA1C 5.4 10/11/2024       IMPRESSION:    Anna Marie Lindo is a 54 y.o. female with history of ADHD who presents for follow up appointment.    Status/Progress: Based on the examination today, the patient's problem(s) is/are well controlled.  New problems have not been presented today.   Co-morbidities are not complicating management of the primary condition.  There are no active rule-out diagnoses for this patient at this time.     Risk Parameters:  Patient reports no suicidal ideation  Patient reports no homicidal ideation  Patient reports no self-injurious behavior  Patient reports no violent behavior    DIAGNOSES:    ICD-10-CM ICD-9-CM   1. ADHD (attention deficit hyperactivity disorder), inattentive type  F90.0 314.00       PLAN:  Continue dextroamphetamine-amphetamine IR 10 mg q.a.m. for ADHD  Continue dextroamphetamine-amphetamine XR 30 mg mid morning for ADHD      RETURN TO CLINIC:   3 months       YessicaVLADIMIR Mart, PMHNP-BC    20 minutes of total time spent on the encounter, which includes face to face time and non-face to face time preparing to see the patient (eg. review of tests), obtaining and/or reviewing separately obtained history, documenting clinical information in the electronic health record, independently interpreting results (not separately reported), and communicating results to the patient/family/caregiver, or care coordination (not separately reported).     Visit today included managing the longitudinal care of the patient due to the serious and/or complex managed problem(s) ADHD.    At this time there are no indications the patient represents an imminent danger to either  "themselves or others; will continue to manage treatment in the outpatient setting.    I discussed the patient's care with the patient including benefits, alternatives, possible adverse effects of the treatment plan; including the potential for metabolic complications, major organ dysfunction, black box warnings, and contraindications. The opportunity was given for questions/clarification, and after this discussion the above treatment plan was devised through shared decision making. The patient voiced their understanding of the diagnoses and treatments listed above and agreed to the treatment plan. Follow up plan was reviewed with the patient. The patient was advised to call to report any worsening of symptoms or problems with medication.    Supportive therapy and psychoeducation provided. Patient has been given crisis information including Suicide and Crisis Lifeline (call or text: 310). Patient also given instructions to go to the nearest ER or call 911 if unable to remain safe or if the Pt develops thoughts of harming self or others.    [facility]: Reviewed today to detect potential controlled substance misuse, diversion, excessive prescribing, or multiple providers prescribing controlled substances. The patients report was deemed appropriate without new medications of concern prescribed by other providers.    Documentation entered by me for this encounter may have been done in part using [employer] voice recognition transcription software. Garbled syntax, mangled pronouns, and other bizarre constructions may be attributed to that software system. Although I have made an effort to ensure accuracy, "sound like" errors may exist and should be interpreted in context.    "

## 2025-02-03 DIAGNOSIS — F90.0 ADHD (ATTENTION DEFICIT HYPERACTIVITY DISORDER), INATTENTIVE TYPE: Primary | ICD-10-CM

## 2025-02-03 RX ORDER — DEXTROAMPHETAMINE SACCHARATE, AMPHETAMINE ASPARTATE, DEXTROAMPHETAMINE SULFATE AND AMPHETAMINE SULFATE 2.5; 2.5; 2.5; 2.5 MG/1; MG/1; MG/1; MG/1
1 TABLET ORAL DAILY
Qty: 30 TABLET | Refills: 0 | Status: SHIPPED | OUTPATIENT
Start: 2025-02-03 | End: 2025-03-05

## 2025-02-03 RX ORDER — DEXTROAMPHETAMINE SACCHARATE, AMPHETAMINE ASPARTATE, DEXTROAMPHETAMINE SULFATE AND AMPHETAMINE SULFATE 2.5; 2.5; 2.5; 2.5 MG/1; MG/1; MG/1; MG/1
1 TABLET ORAL
COMMUNITY
Start: 2025-01-31 | End: 2025-02-03 | Stop reason: SDUPTHER

## 2025-03-31 ENCOUNTER — OFFICE VISIT (OUTPATIENT)
Dept: PSYCHIATRY | Facility: CLINIC | Age: 55
End: 2025-03-31
Payer: COMMERCIAL

## 2025-03-31 DIAGNOSIS — F90.0 ADHD (ATTENTION DEFICIT HYPERACTIVITY DISORDER), INATTENTIVE TYPE: ICD-10-CM

## 2025-03-31 PROCEDURE — 1160F RVW MEDS BY RX/DR IN RCRD: CPT | Mod: CPTII,95,,

## 2025-03-31 PROCEDURE — 98005 SYNCH AUDIO-VIDEO EST LOW 20: CPT | Mod: 95,,,

## 2025-03-31 PROCEDURE — 1159F MED LIST DOCD IN RCRD: CPT | Mod: CPTII,95,,

## 2025-03-31 RX ORDER — DEXTROAMPHETAMINE SACCHARATE, AMPHETAMINE ASPARTATE, DEXTROAMPHETAMINE SULFATE AND AMPHETAMINE SULFATE 2.5; 2.5; 2.5; 2.5 MG/1; MG/1; MG/1; MG/1
1 TABLET ORAL DAILY
Qty: 30 TABLET | Refills: 0 | Status: SHIPPED | OUTPATIENT
Start: 2025-03-31 | End: 2025-04-30

## 2025-03-31 RX ORDER — DEXTROAMPHETAMINE SACCHARATE, AMPHETAMINE ASPARTATE MONOHYDRATE, DEXTROAMPHETAMINE SULFATE AND AMPHETAMINE SULFATE 5; 5; 5; 5 MG/1; MG/1; MG/1; MG/1
40 CAPSULE, EXTENDED RELEASE ORAL EVERY MORNING
Qty: 60 CAPSULE | Refills: 0 | Status: SHIPPED | OUTPATIENT
Start: 2025-03-31 | End: 2025-04-30

## 2025-03-31 NOTE — PROGRESS NOTES
"OUTPATIENT PSYCHIATRY FOLLOW UP VISIT    Encounter Date: 3/31/2025    Clinical Status of Patient:  Outpatient (Virtual)  The patient location is: [address] [place], [place] [address]  [place] [address]  The patient phone number is: [phone]   Visit type: Virtual visit with synchronous audio and video  Each patient to whom he or she provides medical services by telemedicine is:  (1) informed of the relationship between the practitioner and patient and the respective role of any other health care provider with respect to management of the patient; and (2) notified that he or she may decline to receive medical services by telemedicine and may withdraw from such care at any time.    Chief Complaint:  Anna Marie Lindo is a 54 y.o. female who presents today for follow-up.  Met with patient.      HISTORY OF PRESENTING ILLNESS:  Anna Marie Lindo is a 54 y.o. female with history of ADHD-IT who presents for follow up appointment.      Plan at last appointment:   Continue dextroamphetamine-amphetamine IR 10 mg q.a.m. for ADHD  Continue dextroamphetamine-amphetamine XR 30 mg mid morning for ADHD    Psychotropic medication history:   Alprazolam      INTERVAL HISTORY:    Pt reports difficulty concentrating recently, stating, "it's hard to even start a task. I feel overwhelmed." She notes increased anxiety related to difficulty functioning at work and at home. Concentration becomes more difficult after lunch.    Pt reports her mood continues to be stable.     Is patient experiencing or having changes in:  Trouble with sleep: no  Appetite changes:  no  Weight changes:  no  Lack of energy:  no  Anhedonia:  no  Somatic symptoms:  no  Anxiety/panic:  see HPI   Irritability: no  Guilty/hopeless:  no  Racing thoughts: no  Impulsive behaviors: no  Paranoia/AVH: no  Self-injurious behavior/risky behavior:  no  Any drugs:  no  Alcohol:  no    No interval episodes with symptoms consistent with wilber or hypomania.  Denied interval or current " "suicidal/homicidal thoughts, intent, or plan or NSSI.  Denied other questions and concerns.    Medication side effects: None Pt denies cardiovascular events including increased heart rate or increased blood pressure, palpitations, chest pain, dizziness, lightheadedness, or syncopal episodes. Pt denies A/V hallucinations or behavioral effects. Pt denies anorexia, decreased appetite, xerostomia, headache, insomnia, or digital changes.  Medication adherence: no    MEDICAL REVIEW OF SYSTEMS:   Pain: Denies any significant chronic or acute pain.  Constitutional: Denies fever or change in appetite.  Cardiovascular: Denies chest pain or exertional dyspnea.  Respiratory: Reji cough or orthopnea.   GI: denies abdominal pain, denies N/V  Neurological: Denies tremor, seizure, or focal weakness.  Psychiatric: See HPI above.    PAST PSYCHIATRIC, MEDICAL, AND SOCIAL HISTORY REVIEWED  The patient's past medical, family and social history have been reviewed and updated as appropriate within the electronic medical record - see encounter notes.    PAST MEDICAL HISTORY:   Past Medical History:   Diagnosis Date    GERD (gastroesophageal reflux disease)     Hypokalemia     Head trauma/Loss of consciousness: denies  Seizures: denies     PAST PSYCHIATRIC HISTORY:  First psych contact:  Today, 2/23/22  Prior hospitalizations: denies  Prior suicide attempts or self-harm: denies  Prior diagnosis: denies  Prior psychotherapy: denies    ALLERGIES:  Review of patient's allergies indicates:   Allergen Reactions    Sulfa (sulfonamide antibiotics) Itching       EXAM:  Constitutional  Vitals:  Most recent vital signs were reviewed.   Last 3 sets of VS:      8/9/2023     1:36 PM 12/20/2023     8:39 AM 9/27/2024     1:08 PM   Vitals - 1 value per visit   SYSTOLIC 115 127 111   DIASTOLIC 79 85 76   Pulse 77 79 91   Temp  97.3 °F (36.3 °C)    Weight (lb) 151.24 152 145.28   Weight (kg) 68.6 68.947 65.9   Height 5' 4" (1.626 m) 5' 4" (1.626 m) 5' 4" " (1.626 m)   BMI (Calculated) 25.9 26.1 24.9   Pain Score Zero Zero Zero      General:  unremarkable, age appropriate     Musculoskeletal  Muscle Strength/Tone:  No tremors appreciated   Gait & Station:  Pt seated during virtual visit     Psychiatric  Speech:  no latency; no press   Mood & Affect:  euthymic  congruent and appropriate   Thought Process:  normal and logical   Associations:  intact   Thought Content:  normal, no suicidality, no homicidality, delusions, or paranoia   Insight:  intact   Judgement: behavior is adequate to circumstances   Orientation:  grossly intact   Memory: intact for content of interview   Language: grossly intact   Attention Span & Concentration:  Intact to interview   Fund of Knowledge:  Not tested     SUICIDE RISK ASSESSMENT:  Protective factors: age, gender, no prior attempts, no prior hospitalizations, no ongoing substance abuse, no psychosis, , has children, denies SI/intent/plan, seeking treatment, access to treatment, future oriented, good primary support, no access to firearms  Risks: hx ADHD  Patient is a low immediate and long-term risk considering risk factors.     RELEVANT LABS/STUDIES:    Lab Results   Component Value Date    WBC 5.5 10/11/2024    HGB 14.3 10/11/2024    HCT 43.4 10/11/2024    MCV 94 10/11/2024     10/11/2024     BMP  Lab Results   Component Value Date     10/11/2024    K 4.0 10/11/2024     10/11/2024    CO2 24 10/11/2024    BUN 12 10/11/2024    CREATININE 0.73 10/11/2024    CALCIUM 8.9 10/11/2024    ANIONGAP 11 12/20/2023    ESTGFRAFRICA >60.0 03/30/2022    EGFRNONAA >60.0 03/30/2022     Lab Results   Component Value Date    ALT 22 10/11/2024    AST 17 10/11/2024    ALKPHOS 93 12/20/2023    BILITOT 0.2 10/11/2024     Lab Results   Component Value Date    TSH 1.010 10/11/2024     Lab Results   Component Value Date    HGBA1C 5.4 10/11/2024       IMPRESSION:    Anna Marie Lindo is a 54 y.o. female with history of ADHD who presents for  follow up appointment.    Status/Progress: Based on the examination today, the patient's problem(s) is/are well controlled.  New problems have not been presented today.   Co-morbidities are not complicating management of the primary condition.  There are no active rule-out diagnoses for this patient at this time.     Risk Parameters:  Patient reports no suicidal ideation  Patient reports no homicidal ideation  Patient reports no self-injurious behavior  Patient reports no violent behavior    DIAGNOSES:    ICD-10-CM ICD-9-CM   1. ADHD (attention deficit hyperactivity disorder), inattentive type  F90.0 314.00       PLAN:  Continue dextroamphetamine-amphetamine IR 10 mg q.a.m. for ADHD  Increase dextroamphetamine-amphetamine XR 30  to 40 mg mid morning for ADHD      RETURN TO CLINIC:   1 month       YessicaVLADIMIR Mart, PMHNP-BC    20 minutes of total time spent on the encounter, which includes face to face time and non-face to face time preparing to see the patient (eg. review of tests), obtaining and/or reviewing separately obtained history, documenting clinical information in the electronic health record, independently interpreting results (not separately reported), and communicating results to the patient/family/caregiver, or care coordination (not separately reported).     Visit today included managing the longitudinal care of the patient due to the serious and/or complex managed problem(s) ADHD.    At this time there are no indications the patient represents an imminent danger to either themselves or others; will continue to manage treatment in the outpatient setting.    I discussed the patient's care with the patient including benefits, alternatives, possible adverse effects of the treatment plan; including the potential for metabolic complications, major organ dysfunction, black box warnings, and contraindications. The opportunity was given for questions/clarification, and after this discussion the above  "treatment plan was devised through shared decision making. The patient voiced their understanding of the diagnoses and treatments listed above and agreed to the treatment plan. Follow up plan was reviewed with the patient. The patient was advised to call to report any worsening of symptoms or problems with medication.    Supportive therapy and psychoeducation provided. Patient has been given crisis information including Suicide and Crisis Lifeline (call or text: 912). Patient also given instructions to go to the nearest ER or call 911 if unable to remain safe or if the Pt develops thoughts of harming self or others.    [facility]: Reviewed today to detect potential controlled substance misuse, diversion, excessive prescribing, or multiple providers prescribing controlled substances. The patients report was deemed appropriate without new medications of concern prescribed by other providers.    Documentation entered by me for this encounter may have been done in part using [employer] voice recognition transcription software. Garbled syntax, mangled pronouns, and other bizarre constructions may be attributed to that software system. Although I have made an effort to ensure accuracy, "sound like" errors may exist and should be interpreted in context.      "

## 2025-04-26 ENCOUNTER — PATIENT MESSAGE (OUTPATIENT)
Dept: ADMINISTRATIVE | Facility: HOSPITAL | Age: 55
End: 2025-04-26
Payer: COMMERCIAL

## 2025-05-05 ENCOUNTER — OFFICE VISIT (OUTPATIENT)
Dept: PSYCHIATRY | Facility: CLINIC | Age: 55
End: 2025-05-05
Payer: COMMERCIAL

## 2025-05-05 ENCOUNTER — TELEPHONE (OUTPATIENT)
Dept: PSYCHIATRY | Facility: CLINIC | Age: 55
End: 2025-05-05
Payer: COMMERCIAL

## 2025-05-05 DIAGNOSIS — F90.0 ADHD (ATTENTION DEFICIT HYPERACTIVITY DISORDER), INATTENTIVE TYPE: ICD-10-CM

## 2025-05-05 PROCEDURE — 1160F RVW MEDS BY RX/DR IN RCRD: CPT | Mod: CPTII,95,,

## 2025-05-05 PROCEDURE — 1159F MED LIST DOCD IN RCRD: CPT | Mod: CPTII,95,,

## 2025-05-05 PROCEDURE — G2211 COMPLEX E/M VISIT ADD ON: HCPCS | Mod: 95,,,

## 2025-05-05 PROCEDURE — 98005 SYNCH AUDIO-VIDEO EST LOW 20: CPT | Mod: 95,,,

## 2025-05-05 RX ORDER — DEXTROAMPHETAMINE SACCHARATE, AMPHETAMINE ASPARTATE, DEXTROAMPHETAMINE SULFATE AND AMPHETAMINE SULFATE 2.5; 2.5; 2.5; 2.5 MG/1; MG/1; MG/1; MG/1
1 TABLET ORAL DAILY
Qty: 30 TABLET | Refills: 0 | Status: SHIPPED | OUTPATIENT
Start: 2025-07-04 | End: 2025-05-05

## 2025-05-05 RX ORDER — DEXTROAMPHETAMINE SACCHARATE, AMPHETAMINE ASPARTATE, DEXTROAMPHETAMINE SULFATE AND AMPHETAMINE SULFATE 2.5; 2.5; 2.5; 2.5 MG/1; MG/1; MG/1; MG/1
1 TABLET ORAL DAILY
Qty: 30 TABLET | Refills: 0 | Status: SHIPPED | OUTPATIENT
Start: 2025-05-05 | End: 2025-05-05

## 2025-05-05 RX ORDER — DEXTROAMPHETAMINE SACCHARATE, AMPHETAMINE ASPARTATE MONOHYDRATE, DEXTROAMPHETAMINE SULFATE AND AMPHETAMINE SULFATE 5; 5; 5; 5 MG/1; MG/1; MG/1; MG/1
40 CAPSULE, EXTENDED RELEASE ORAL EVERY MORNING
Qty: 60 CAPSULE | Refills: 0 | Status: SHIPPED | OUTPATIENT
Start: 2025-06-04 | End: 2025-07-04

## 2025-05-05 RX ORDER — DEXTROAMPHETAMINE SACCHARATE, AMPHETAMINE ASPARTATE, DEXTROAMPHETAMINE SULFATE AND AMPHETAMINE SULFATE 2.5; 2.5; 2.5; 2.5 MG/1; MG/1; MG/1; MG/1
1 TABLET ORAL DAILY
Qty: 30 TABLET | Refills: 0 | Status: SHIPPED | OUTPATIENT
Start: 2025-05-05 | End: 2025-06-04

## 2025-05-05 RX ORDER — DEXTROAMPHETAMINE SACCHARATE, AMPHETAMINE ASPARTATE MONOHYDRATE, DEXTROAMPHETAMINE SULFATE AND AMPHETAMINE SULFATE 5; 5; 5; 5 MG/1; MG/1; MG/1; MG/1
40 CAPSULE, EXTENDED RELEASE ORAL EVERY MORNING
Qty: 60 CAPSULE | Refills: 0 | Status: SHIPPED | OUTPATIENT
Start: 2025-07-04 | End: 2025-08-03

## 2025-05-05 RX ORDER — DEXTROAMPHETAMINE SACCHARATE, AMPHETAMINE ASPARTATE MONOHYDRATE, DEXTROAMPHETAMINE SULFATE AND AMPHETAMINE SULFATE 5; 5; 5; 5 MG/1; MG/1; MG/1; MG/1
40 CAPSULE, EXTENDED RELEASE ORAL EVERY MORNING
Qty: 60 CAPSULE | Refills: 0 | OUTPATIENT
Start: 2025-05-05 | End: 2025-06-04

## 2025-05-05 RX ORDER — DEXTROAMPHETAMINE SACCHARATE, AMPHETAMINE ASPARTATE MONOHYDRATE, DEXTROAMPHETAMINE SULFATE AND AMPHETAMINE SULFATE 5; 5; 5; 5 MG/1; MG/1; MG/1; MG/1
40 CAPSULE, EXTENDED RELEASE ORAL EVERY MORNING
Qty: 60 CAPSULE | Refills: 0 | Status: SHIPPED | OUTPATIENT
Start: 2025-05-05 | End: 2025-06-04

## 2025-05-05 RX ORDER — DEXTROAMPHETAMINE SACCHARATE, AMPHETAMINE ASPARTATE, DEXTROAMPHETAMINE SULFATE AND AMPHETAMINE SULFATE 2.5; 2.5; 2.5; 2.5 MG/1; MG/1; MG/1; MG/1
1 TABLET ORAL DAILY
Qty: 30 TABLET | Refills: 0 | Status: SHIPPED | OUTPATIENT
Start: 2025-06-04 | End: 2025-07-04

## 2025-05-05 RX ORDER — DEXTROAMPHETAMINE SACCHARATE, AMPHETAMINE ASPARTATE MONOHYDRATE, DEXTROAMPHETAMINE SULFATE AND AMPHETAMINE SULFATE 5; 5; 5; 5 MG/1; MG/1; MG/1; MG/1
40 CAPSULE, EXTENDED RELEASE ORAL EVERY MORNING
Qty: 60 CAPSULE | Refills: 0 | Status: SHIPPED | OUTPATIENT
Start: 2025-07-04 | End: 2025-05-05

## 2025-05-05 RX ORDER — DEXTROAMPHETAMINE SACCHARATE, AMPHETAMINE ASPARTATE MONOHYDRATE, DEXTROAMPHETAMINE SULFATE AND AMPHETAMINE SULFATE 5; 5; 5; 5 MG/1; MG/1; MG/1; MG/1
40 CAPSULE, EXTENDED RELEASE ORAL EVERY MORNING
Qty: 60 CAPSULE | Refills: 0 | Status: SHIPPED | OUTPATIENT
Start: 2025-05-05 | End: 2025-05-05 | Stop reason: SDUPTHER

## 2025-05-05 RX ORDER — DEXTROAMPHETAMINE SACCHARATE, AMPHETAMINE ASPARTATE, DEXTROAMPHETAMINE SULFATE AND AMPHETAMINE SULFATE 2.5; 2.5; 2.5; 2.5 MG/1; MG/1; MG/1; MG/1
1 TABLET ORAL DAILY
Qty: 30 TABLET | Refills: 0 | Status: SHIPPED | OUTPATIENT
Start: 2025-07-04 | End: 2025-08-03

## 2025-05-05 RX ORDER — DEXTROAMPHETAMINE SACCHARATE, AMPHETAMINE ASPARTATE MONOHYDRATE, DEXTROAMPHETAMINE SULFATE AND AMPHETAMINE SULFATE 5; 5; 5; 5 MG/1; MG/1; MG/1; MG/1
40 CAPSULE, EXTENDED RELEASE ORAL EVERY MORNING
Qty: 60 CAPSULE | Refills: 0 | Status: SHIPPED | OUTPATIENT
Start: 2025-06-04 | End: 2025-05-05

## 2025-05-05 RX ORDER — DEXTROAMPHETAMINE SACCHARATE, AMPHETAMINE ASPARTATE, DEXTROAMPHETAMINE SULFATE AND AMPHETAMINE SULFATE 2.5; 2.5; 2.5; 2.5 MG/1; MG/1; MG/1; MG/1
1 TABLET ORAL DAILY
Qty: 30 TABLET | Refills: 0 | Status: SHIPPED | OUTPATIENT
Start: 2025-06-04 | End: 2025-05-05

## 2025-05-05 NOTE — PROGRESS NOTES
OUTPATIENT PSYCHIATRY FOLLOW UP VISIT    Encounter Date: 5/5/2025    Clinical Status of Patient:  Outpatient (Virtual)  The patient location is: [address] [place], [place] [address]  [facility] [address]  The patient phone number is: [phone]   Visit type: Virtual visit with synchronous audio and video  Each patient to whom he or she provides medical services by telemedicine is:  (1) informed of the relationship between the practitioner and patient and the respective role of any other health care provider with respect to management of the patient; and (2) notified that he or she may decline to receive medical services by telemedicine and may withdraw from such care at any time.    Chief Complaint:  Anna Marie Lindo is a 54 y.o. female who presents today for follow-up.  Met with patient.      HISTORY OF PRESENTING ILLNESS:  Anna Marie Lindo is a 54 y.o. female with history of ADHD-IT who presents for follow up appointment.      Plan at last appointment:   Continue dextroamphetamine-amphetamine IR 10 mg q.a.m. for ADHD  Increase dextroamphetamine-amphetamine XR 30  to 40 mg mid morning for ADHD    Psychotropic medication history:   Alprazolam      INTERVAL HISTORY:    At last visit, dextroamphetamine-amphetamine XR was increased from 30 to 40 mg mid-morning for ADHD. Today, Pt reports marked improvement in attention and concentration. She was able to complete her college courses. She states she is no longer feeling overwhelmed. No longer experiencing difficulty with concentration in the afternoon; effects of medication last throughout her day without interfering with sleep.    Pt reports her mood continues to be stable. Anxiety has decreased and is well controlled.     Is patient experiencing or having changes in:  Trouble with sleep: no  Appetite changes:  no  Weight changes:  no  Lack of energy:  no  Anhedonia:  no  Somatic symptoms:  no  Anxiety/panic:  see HPI   Irritability: no  Guilty/hopeless:  no  Racing thoughts:  no  Impulsive behaviors: no  Paranoia/AVH: no  Self-injurious behavior/risky behavior:  no  Any drugs:  no  Alcohol:  no    No interval episodes with symptoms consistent with wilber or hypomania.  Denied interval or current suicidal/homicidal thoughts, intent, or plan or NSSI.  Denied other questions and concerns.    Medication side effects: None Pt denies cardiovascular events including increased heart rate or increased blood pressure, palpitations, chest pain, dizziness, lightheadedness, or syncopal episodes. Pt denies A/V hallucinations or behavioral effects. Pt denies anorexia, decreased appetite, xerostomia, headache, insomnia, or digital changes.  Medication adherence: no    MEDICAL REVIEW OF SYSTEMS:   Pain: Denies any significant chronic or acute pain.  Constitutional: Denies fever or change in appetite.  Cardiovascular: Denies chest pain or exertional dyspnea.  Respiratory: Reji cough or orthopnea.   GI: denies abdominal pain, denies N/V  Neurological: Denies tremor, seizure, or focal weakness.  Psychiatric: See HPI above.    PAST PSYCHIATRIC, MEDICAL, AND SOCIAL HISTORY REVIEWED  The patient's past medical, family and social history have been reviewed and updated as appropriate within the electronic medical record - see encounter notes.    PAST MEDICAL HISTORY:   Past Medical History:   Diagnosis Date    GERD (gastroesophageal reflux disease)     Hypokalemia     Head trauma/Loss of consciousness: denies  Seizures: denies     PAST PSYCHIATRIC HISTORY:  First psych contact:  Today, 2/23/22  Prior hospitalizations: denies  Prior suicide attempts or self-harm: denies  Prior diagnosis: denies  Prior psychotherapy: denies    ALLERGIES:  Review of patient's allergies indicates:   Allergen Reactions    Sulfa (sulfonamide antibiotics) Itching       EXAM:  Constitutional  Vitals:  Most recent vital signs were reviewed.   Last 3 sets of VS:      8/9/2023     1:36 PM 12/20/2023     8:39 AM 9/27/2024     1:08 PM  "  Vitals - 1 value per visit   SYSTOLIC 115 127 111   DIASTOLIC 79 85 76   Pulse 77 79 91   Temp  97.3 °F (36.3 °C)    Weight (lb) 151.24 152 145.28   Weight (kg) 68.6 68.947 65.9   Height 5' 4" (1.626 m) 5' 4" (1.626 m) 5' 4" (1.626 m)   BMI (Calculated) 25.9 26.1 24.9   Pain Score Zero Zero Zero      General:  unremarkable, age appropriate     Musculoskeletal  Muscle Strength/Tone:  No tremors appreciated   Gait & Station:  Pt seated during virtual visit     Psychiatric  Speech:  no latency; no press   Mood & Affect:  euthymic  congruent and appropriate   Thought Process:  normal and logical   Associations:  intact   Thought Content:  normal, no suicidality, no homicidality, delusions, or paranoia   Insight:  intact   Judgement: behavior is adequate to circumstances   Orientation:  grossly intact   Memory: intact for content of interview   Language: grossly intact   Attention Span & Concentration:  Intact to interview   Fund of Knowledge:  Not tested     SUICIDE RISK ASSESSMENT:  Protective factors: age, gender, no prior attempts, no prior hospitalizations, no ongoing substance abuse, no psychosis, , has children, denies SI/intent/plan, seeking treatment, access to treatment, future oriented, good primary support, no access to firearms  Risks: hx ADHD  Patient is a low immediate and long-term risk considering risk factors.     RELEVANT LABS/STUDIES:    Lab Results   Component Value Date    WBC 5.5 10/11/2024    HGB 14.3 10/11/2024    HCT 43.4 10/11/2024    MCV 94 10/11/2024     10/11/2024     BMP  Lab Results   Component Value Date     10/11/2024    K 4.0 10/11/2024     10/11/2024    CO2 24 10/11/2024    BUN 12 10/11/2024    CREATININE 0.73 10/11/2024    CALCIUM 8.9 10/11/2024    ANIONGAP 11 12/20/2023    ESTGFRAFRICA >60.0 03/30/2022    EGFRNONAA >60.0 03/30/2022     Lab Results   Component Value Date    ALT 22 10/11/2024    AST 17 10/11/2024    ALKPHOS 93 12/20/2023    BILITOT 0.2 " 10/11/2024     Lab Results   Component Value Date    TSH 1.010 10/11/2024     Lab Results   Component Value Date    HGBA1C 5.4 10/11/2024       IMPRESSION:    Anna Marie Lindo is a 54 y.o. female with history of ADHD who presents for follow up appointment.    Status/Progress: Based on the examination today, the patient's problem(s) is/are well controlled.  New problems have not been presented today.   Co-morbidities are not complicating management of the primary condition.  There are no active rule-out diagnoses for this patient at this time.     Risk Parameters:  Patient reports no suicidal ideation  Patient reports no homicidal ideation  Patient reports no self-injurious behavior  Patient reports no violent behavior    DIAGNOSES:    ICD-10-CM ICD-9-CM   1. ADHD (attention deficit hyperactivity disorder), inattentive type  F90.0 314.00       PLAN:  Continue dextroamphetamine-amphetamine IR 10 mg q.a.m. for ADHD  Continue dextroamphetamine-amphetamine XR 40 mg mid-morning for ADHD      RETURN TO CLINIC:   1 month       VLADIMIR Bradford, PMHNP-BC    25 minutes of total time spent on the encounter, which includes face to face time and non-face to face time preparing to see the patient (eg. review of tests), obtaining and/or reviewing separately obtained history, documenting clinical information in the electronic health record, independently interpreting results (not separately reported), and communicating results to the patient/family/caregiver, or care coordination (not separately reported).     Visit today included managing the longitudinal care of the patient due to the serious and/or complex managed problem(s) ADHD.    At this time there are no indications the patient represents an imminent danger to either themselves or others; will continue to manage treatment in the outpatient setting.    I discussed the patient's care with the patient including benefits, alternatives, possible adverse effects of the  "treatment plan; including the potential for metabolic complications, major organ dysfunction, black box warnings, and contraindications. The opportunity was given for questions/clarification, and after this discussion the above treatment plan was devised through shared decision making. The patient voiced their understanding of the diagnoses and treatments listed above and agreed to the treatment plan. Follow up plan was reviewed with the patient. The patient was advised to call to report any worsening of symptoms or problems with medication.    Supportive therapy and psychoeducation provided. Patient has been given crisis information including Suicide and Crisis Lifeline (call or text: 246). Patient also given instructions to go to the nearest ER or call 911 if unable to remain safe or if the Pt develops thoughts of harming self or others.    [facility]: Reviewed today to detect potential controlled substance misuse, diversion, excessive prescribing, or multiple providers prescribing controlled substances. The patients report was deemed appropriate without new medications of concern prescribed by other providers.    Documentation entered by me for this encounter may have been done in part using [employer] voice recognition transcription software. Garbled syntax, mangled pronouns, and other bizarre constructions may be attributed to that software system. Although I have made an effort to ensure accuracy, "sound like" errors may exist and should be interpreted in context.        "

## 2025-05-05 NOTE — TELEPHONE ENCOUNTER
Please cancel RX to [facility] and send it to [facility] [address].     thanks

## 2025-05-29 DIAGNOSIS — E87.6 HYPOKALEMIA: ICD-10-CM

## 2025-05-29 RX ORDER — POTASSIUM CHLORIDE 750 MG/1
30 CAPSULE, EXTENDED RELEASE ORAL
Qty: 270 CAPSULE | Refills: 0 | Status: SHIPPED | OUTPATIENT
Start: 2025-05-29

## 2025-05-29 NOTE — TELEPHONE ENCOUNTER
No care due was identified.  [facility] Embedded Care Due Messages. Reference number: [id number].   5/29/2025 8:13:54 AM CDT

## 2025-05-29 NOTE — TELEPHONE ENCOUNTER
Refill Routing Note   Medication(s) are not appropriate for processing by [facility] for the following reason(s):        Patient not seen by provider within 15 months    [facility] action(s):  Defer               Appointments  past 12m or future 3m with PCP    Date Provider   Last Visit   12/20/2023 Dacia King MD   Next Visit   6/12/2025 Dacia King MD   ED visits in past 90 days: 0        Note composed:11:02 AM 05/29/2025

## 2025-06-04 DIAGNOSIS — F90.0 ADHD (ATTENTION DEFICIT HYPERACTIVITY DISORDER), INATTENTIVE TYPE: ICD-10-CM

## 2025-06-04 RX ORDER — DEXTROAMPHETAMINE SACCHARATE, AMPHETAMINE ASPARTATE, DEXTROAMPHETAMINE SULFATE AND AMPHETAMINE SULFATE 2.5; 2.5; 2.5; 2.5 MG/1; MG/1; MG/1; MG/1
1 TABLET ORAL DAILY
Qty: 30 TABLET | Refills: 0 | Status: SHIPPED | OUTPATIENT
Start: 2025-06-04 | End: 2025-07-04

## 2025-08-04 ENCOUNTER — PATIENT MESSAGE (OUTPATIENT)
Dept: PSYCHIATRY | Facility: CLINIC | Age: 55
End: 2025-08-04
Payer: COMMERCIAL

## 2025-08-04 DIAGNOSIS — F90.0 ADHD (ATTENTION DEFICIT HYPERACTIVITY DISORDER), INATTENTIVE TYPE: ICD-10-CM

## 2025-08-04 RX ORDER — DEXTROAMPHETAMINE SACCHARATE, AMPHETAMINE ASPARTATE, DEXTROAMPHETAMINE SULFATE AND AMPHETAMINE SULFATE 2.5; 2.5; 2.5; 2.5 MG/1; MG/1; MG/1; MG/1
1 TABLET ORAL DAILY
Qty: 30 TABLET | Refills: 0 | Status: SHIPPED | OUTPATIENT
Start: 2025-08-04 | End: 2025-09-03

## 2025-08-11 ENCOUNTER — OFFICE VISIT (OUTPATIENT)
Dept: FAMILY MEDICINE | Facility: CLINIC | Age: 55
End: 2025-08-11
Payer: COMMERCIAL

## 2025-08-11 VITALS
HEART RATE: 83 BPM | HEIGHT: 64 IN | SYSTOLIC BLOOD PRESSURE: 116 MMHG | TEMPERATURE: 98 F | WEIGHT: 138 LBS | DIASTOLIC BLOOD PRESSURE: 82 MMHG | BODY MASS INDEX: 23.56 KG/M2

## 2025-08-11 DIAGNOSIS — Z13.220 ENCOUNTER FOR LIPID SCREENING FOR CARDIOVASCULAR DISEASE: ICD-10-CM

## 2025-08-11 DIAGNOSIS — D17.71 RENAL ANGIOMYOLIPOMA: ICD-10-CM

## 2025-08-11 DIAGNOSIS — Z13.6 ENCOUNTER FOR LIPID SCREENING FOR CARDIOVASCULAR DISEASE: ICD-10-CM

## 2025-08-11 DIAGNOSIS — R60.9 EDEMA, UNSPECIFIED TYPE: ICD-10-CM

## 2025-08-11 DIAGNOSIS — Z13.1 SCREENING FOR DIABETES MELLITUS: ICD-10-CM

## 2025-08-11 DIAGNOSIS — K21.9 GASTROESOPHAGEAL REFLUX DISEASE WITHOUT ESOPHAGITIS: ICD-10-CM

## 2025-08-11 DIAGNOSIS — Z00.00 ANNUAL PHYSICAL EXAM: Primary | ICD-10-CM

## 2025-08-11 PROCEDURE — 1159F MED LIST DOCD IN RCRD: CPT | Mod: CPTII,S$GLB,, | Performed by: INTERNAL MEDICINE

## 2025-08-11 PROCEDURE — 3079F DIAST BP 80-89 MM HG: CPT | Mod: CPTII,S$GLB,, | Performed by: INTERNAL MEDICINE

## 2025-08-11 PROCEDURE — 99396 PREV VISIT EST AGE 40-64: CPT | Mod: S$GLB,,, | Performed by: INTERNAL MEDICINE

## 2025-08-11 PROCEDURE — 3008F BODY MASS INDEX DOCD: CPT | Mod: CPTII,S$GLB,, | Performed by: INTERNAL MEDICINE

## 2025-08-11 PROCEDURE — 99999 PR PBB SHADOW E&M-EST. PATIENT-LVL III: CPT | Mod: PBBFAC,,, | Performed by: INTERNAL MEDICINE

## 2025-08-11 PROCEDURE — 3074F SYST BP LT 130 MM HG: CPT | Mod: CPTII,S$GLB,, | Performed by: INTERNAL MEDICINE

## 2025-08-11 RX ORDER — PANTOPRAZOLE SODIUM 40 MG/1
40 TABLET, DELAYED RELEASE ORAL DAILY
Qty: 90 TABLET | Refills: 3 | Status: SHIPPED | OUTPATIENT
Start: 2025-08-11

## 2025-08-11 RX ORDER — HYDROCHLOROTHIAZIDE 25 MG/1
25 TABLET ORAL DAILY
Qty: 90 TABLET | Refills: 3 | Status: SHIPPED | OUTPATIENT
Start: 2025-08-11

## 2025-08-11 RX ORDER — DEXTROAMPHETAMINE SACCHARATE, AMPHETAMINE ASPARTATE MONOHYDRATE, DEXTROAMPHETAMINE SULFATE AND AMPHETAMINE SULFATE 5; 5; 5; 5 MG/1; MG/1; MG/1; MG/1
20 CAPSULE, EXTENDED RELEASE ORAL EVERY MORNING
COMMUNITY
Start: 2025-07-01 | End: 2025-08-19

## 2025-08-11 RX ORDER — PANTOPRAZOLE SODIUM 40 MG/1
40 TABLET, DELAYED RELEASE ORAL DAILY
COMMUNITY
End: 2025-08-11 | Stop reason: SDUPTHER

## 2025-08-19 ENCOUNTER — OFFICE VISIT (OUTPATIENT)
Dept: PSYCHIATRY | Facility: CLINIC | Age: 55
End: 2025-08-19
Payer: COMMERCIAL

## 2025-08-19 VITALS
HEIGHT: 64 IN | HEART RATE: 79 BPM | DIASTOLIC BLOOD PRESSURE: 93 MMHG | WEIGHT: 138.25 LBS | SYSTOLIC BLOOD PRESSURE: 124 MMHG | BODY MASS INDEX: 23.6 KG/M2

## 2025-08-19 DIAGNOSIS — F90.0 ADHD (ATTENTION DEFICIT HYPERACTIVITY DISORDER), INATTENTIVE TYPE: ICD-10-CM

## 2025-08-19 PROCEDURE — 99999 PR PBB SHADOW E&M-EST. PATIENT-LVL III: CPT | Mod: PBBFAC,,,

## 2025-08-19 PROCEDURE — 1160F RVW MEDS BY RX/DR IN RCRD: CPT | Mod: CPTII,S$GLB,,

## 2025-08-19 PROCEDURE — 3074F SYST BP LT 130 MM HG: CPT | Mod: CPTII,S$GLB,,

## 2025-08-19 PROCEDURE — 1159F MED LIST DOCD IN RCRD: CPT | Mod: CPTII,S$GLB,,

## 2025-08-19 PROCEDURE — 3080F DIAST BP >= 90 MM HG: CPT | Mod: CPTII,S$GLB,,

## 2025-08-19 PROCEDURE — 3008F BODY MASS INDEX DOCD: CPT | Mod: CPTII,S$GLB,,

## 2025-08-19 PROCEDURE — G2211 COMPLEX E/M VISIT ADD ON: HCPCS | Mod: S$GLB,,,

## 2025-08-19 PROCEDURE — 99214 OFFICE O/P EST MOD 30 MIN: CPT | Mod: S$GLB,,,

## 2025-08-19 RX ORDER — DEXTROAMPHETAMINE SACCHARATE, AMPHETAMINE ASPARTATE MONOHYDRATE, DEXTROAMPHETAMINE SULFATE AND AMPHETAMINE SULFATE 5; 5; 5; 5 MG/1; MG/1; MG/1; MG/1
40 CAPSULE, EXTENDED RELEASE ORAL EVERY MORNING
Qty: 180 CAPSULE | Refills: 0 | Status: SHIPPED | OUTPATIENT
Start: 2025-08-19

## 2025-08-19 RX ORDER — DEXTROAMPHETAMINE SACCHARATE, AMPHETAMINE ASPARTATE, DEXTROAMPHETAMINE SULFATE AND AMPHETAMINE SULFATE 2.5; 2.5; 2.5; 2.5 MG/1; MG/1; MG/1; MG/1
1 TABLET ORAL DAILY
Qty: 90 TABLET | Refills: 0 | Status: SHIPPED | OUTPATIENT
Start: 2025-09-01

## 2025-08-22 DIAGNOSIS — E87.6 HYPOKALEMIA: ICD-10-CM

## 2025-08-22 RX ORDER — POTASSIUM CHLORIDE 750 MG/1
30 CAPSULE, EXTENDED RELEASE ORAL DAILY
Qty: 270 CAPSULE | Refills: 3 | Status: SHIPPED | OUTPATIENT
Start: 2025-08-22

## 2025-08-25 DIAGNOSIS — F90.0 ADHD (ATTENTION DEFICIT HYPERACTIVITY DISORDER), INATTENTIVE TYPE: ICD-10-CM

## 2025-08-25 RX ORDER — DEXTROAMPHETAMINE SACCHARATE, AMPHETAMINE ASPARTATE MONOHYDRATE, DEXTROAMPHETAMINE SULFATE AND AMPHETAMINE SULFATE 5; 5; 5; 5 MG/1; MG/1; MG/1; MG/1
40 CAPSULE, EXTENDED RELEASE ORAL EVERY MORNING
Qty: 60 CAPSULE | Refills: 0 | Status: SHIPPED | OUTPATIENT
Start: 2025-08-25

## 2025-08-27 ENCOUNTER — HOSPITAL ENCOUNTER (OUTPATIENT)
Dept: RADIOLOGY | Facility: HOSPITAL | Age: 55
Discharge: HOME | End: 2025-08-27
Attending: INTERNAL MEDICINE
Payer: COMMERCIAL

## 2025-08-27 DIAGNOSIS — D17.71 RENAL ANGIOMYOLIPOMA: ICD-10-CM

## 2025-08-27 PROCEDURE — 74178 CT ABD&PLV WO CNTR FLWD CNTR: CPT | Mod: TC,PO

## 2025-08-27 PROCEDURE — 71260 CT THORAX DX C+: CPT | Mod: TC,PO

## 2025-08-27 PROCEDURE — 71260 CT THORAX DX C+: CPT | Mod: 26,,, | Performed by: STUDENT IN AN ORGANIZED HEALTH CARE EDUCATION/TRAINING PROGRAM

## 2025-08-27 PROCEDURE — 25500020 PHARM REV CODE 255: Mod: PO | Performed by: INTERNAL MEDICINE

## 2025-08-27 PROCEDURE — 74178 CT ABD&PLV WO CNTR FLWD CNTR: CPT | Mod: 26,,, | Performed by: STUDENT IN AN ORGANIZED HEALTH CARE EDUCATION/TRAINING PROGRAM

## 2025-08-27 RX ADMIN — IOHEXOL 75 ML: 350 INJECTION, SOLUTION INTRAVENOUS at 08:08
